# Patient Record
Sex: MALE | Race: WHITE | NOT HISPANIC OR LATINO | Employment: OTHER | URBAN - METROPOLITAN AREA
[De-identification: names, ages, dates, MRNs, and addresses within clinical notes are randomized per-mention and may not be internally consistent; named-entity substitution may affect disease eponyms.]

---

## 2017-01-02 ENCOUNTER — HOSPITAL ENCOUNTER (INPATIENT)
Facility: CLINIC | Age: 57
LOS: 1 days | Discharge: HOME | DRG: 897 | End: 2017-01-03
Attending: EMERGENCY MEDICINE | Admitting: PSYCHIATRY & NEUROLOGY
Payer: COMMERCIAL

## 2017-01-02 DIAGNOSIS — F10.930 ALCOHOL WITHDRAWAL, UNCOMPLICATED (H): ICD-10-CM

## 2017-01-02 PROBLEM — F10.20 ALCOHOL DEPENDENCE (H): Status: ACTIVE | Noted: 2017-01-02

## 2017-01-02 LAB
ALCOHOL BREATH TEST: 0 (ref 0–0.01)
ANION GAP SERPL CALCULATED.3IONS-SCNC: 8 MMOL/L (ref 3–14)
BUN SERPL-MCNC: 15 MG/DL (ref 7–30)
CALCIUM SERPL-MCNC: 8.8 MG/DL (ref 8.5–10.1)
CHLORIDE SERPL-SCNC: 100 MMOL/L (ref 94–109)
CO2 SERPL-SCNC: 29 MMOL/L (ref 20–32)
CREAT SERPL-MCNC: 0.9 MG/DL (ref 0.66–1.25)
GFR SERPL CREATININE-BSD FRML MDRD: 86 ML/MIN/1.7M2
GLUCOSE BLDC GLUCOMTR-MCNC: 99 MG/DL (ref 70–99)
GLUCOSE SERPL-MCNC: 114 MG/DL (ref 70–99)
POTASSIUM SERPL-SCNC: 3.5 MMOL/L (ref 3.4–5.3)
SODIUM SERPL-SCNC: 137 MMOL/L (ref 133–144)

## 2017-01-02 PROCEDURE — 25000132 ZZH RX MED GY IP 250 OP 250 PS 637: Performed by: EMERGENCY MEDICINE

## 2017-01-02 PROCEDURE — 96361 HYDRATE IV INFUSION ADD-ON: CPT | Performed by: EMERGENCY MEDICINE

## 2017-01-02 PROCEDURE — 25000132 ZZH RX MED GY IP 250 OP 250 PS 637: Performed by: PSYCHIATRY & NEUROLOGY

## 2017-01-02 PROCEDURE — 12800008 ZZH R&B CD ADULT

## 2017-01-02 PROCEDURE — 80048 BASIC METABOLIC PNL TOTAL CA: CPT | Performed by: EMERGENCY MEDICINE

## 2017-01-02 PROCEDURE — HZ2ZZZZ DETOXIFICATION SERVICES FOR SUBSTANCE ABUSE TREATMENT: ICD-10-PCS | Performed by: PSYCHIATRY & NEUROLOGY

## 2017-01-02 PROCEDURE — 25000125 ZZHC RX 250: Performed by: EMERGENCY MEDICINE

## 2017-01-02 PROCEDURE — 99285 EMERGENCY DEPT VISIT HI MDM: CPT | Mod: Z6 | Performed by: EMERGENCY MEDICINE

## 2017-01-02 PROCEDURE — 96374 THER/PROPH/DIAG INJ IV PUSH: CPT | Performed by: EMERGENCY MEDICINE

## 2017-01-02 PROCEDURE — 25800025 ZZH RX 258: Performed by: EMERGENCY MEDICINE

## 2017-01-02 PROCEDURE — 99285 EMERGENCY DEPT VISIT HI MDM: CPT | Performed by: EMERGENCY MEDICINE

## 2017-01-02 PROCEDURE — 00000146 ZZHCL STATISTIC GLUCOSE BY METER IP

## 2017-01-02 RX ORDER — MAGNESIUM OXIDE 400 MG/1
800 TABLET ORAL ONCE
Status: COMPLETED | OUTPATIENT
Start: 2017-01-02 | End: 2017-01-02

## 2017-01-02 RX ORDER — LANOLIN ALCOHOL/MO/W.PET/CERES
100 CREAM (GRAM) TOPICAL ONCE
Status: COMPLETED | OUTPATIENT
Start: 2017-01-02 | End: 2017-01-02

## 2017-01-02 RX ORDER — SODIUM CHLORIDE, SODIUM LACTATE, POTASSIUM CHLORIDE, CALCIUM CHLORIDE 600; 310; 30; 20 MG/100ML; MG/100ML; MG/100ML; MG/100ML
1000 INJECTION, SOLUTION INTRAVENOUS CONTINUOUS
Status: DISCONTINUED | OUTPATIENT
Start: 2017-01-02 | End: 2017-01-02

## 2017-01-02 RX ORDER — METOPROLOL TARTRATE 50 MG
50 TABLET ORAL 2 TIMES DAILY
COMMUNITY
End: 2017-12-11

## 2017-01-02 RX ORDER — MULTIVITAMIN,THERAPEUTIC
1 TABLET ORAL ONCE
Status: DISCONTINUED | OUTPATIENT
Start: 2017-01-02 | End: 2017-01-02

## 2017-01-02 RX ORDER — MAGNESIUM OXIDE 400 MG/1
800 TABLET ORAL ONCE
Status: DISCONTINUED | OUTPATIENT
Start: 2017-01-02 | End: 2017-01-02

## 2017-01-02 RX ORDER — CHLORAL HYDRATE 500 MG
1 CAPSULE ORAL DAILY
Status: DISCONTINUED | OUTPATIENT
Start: 2017-01-03 | End: 2017-01-03 | Stop reason: HOSPADM

## 2017-01-02 RX ORDER — LANOLIN ALCOHOL/MO/W.PET/CERES
100 CREAM (GRAM) TOPICAL ONCE
Status: DISCONTINUED | OUTPATIENT
Start: 2017-01-02 | End: 2017-01-02

## 2017-01-02 RX ORDER — ASPIRIN 325 MG
325 TABLET ORAL DAILY
Status: DISCONTINUED | OUTPATIENT
Start: 2017-01-03 | End: 2017-01-03 | Stop reason: HOSPADM

## 2017-01-02 RX ORDER — LANOLIN ALCOHOL/MO/W.PET/CERES
100 CREAM (GRAM) TOPICAL DAILY
Status: DISCONTINUED | OUTPATIENT
Start: 2017-01-03 | End: 2017-01-03 | Stop reason: HOSPADM

## 2017-01-02 RX ORDER — ACETAMINOPHEN 325 MG/1
650 TABLET ORAL EVERY 4 HOURS PRN
Status: DISCONTINUED | OUTPATIENT
Start: 2017-01-02 | End: 2017-01-03 | Stop reason: HOSPADM

## 2017-01-02 RX ORDER — ONDANSETRON 4 MG/1
4 TABLET, ORALLY DISINTEGRATING ORAL EVERY 6 HOURS PRN
Status: DISCONTINUED | OUTPATIENT
Start: 2017-01-02 | End: 2017-01-03 | Stop reason: HOSPADM

## 2017-01-02 RX ORDER — LISINOPRIL 20 MG/1
20 TABLET ORAL DAILY
Status: DISCONTINUED | OUTPATIENT
Start: 2017-01-03 | End: 2017-01-03 | Stop reason: HOSPADM

## 2017-01-02 RX ORDER — FOLIC ACID 1 MG/1
1 TABLET ORAL DAILY
Status: DISCONTINUED | OUTPATIENT
Start: 2017-01-03 | End: 2017-01-03 | Stop reason: HOSPADM

## 2017-01-02 RX ORDER — METOPROLOL TARTRATE 50 MG
50 TABLET ORAL 2 TIMES DAILY
Status: DISCONTINUED | OUTPATIENT
Start: 2017-01-02 | End: 2017-01-03 | Stop reason: HOSPADM

## 2017-01-02 RX ORDER — PHENOL 1.4 %
1 AEROSOL, SPRAY (ML) MUCOUS MEMBRANE
COMMUNITY
End: 2017-12-11

## 2017-01-02 RX ORDER — DIAZEPAM 5 MG
5-20 TABLET ORAL EVERY 30 MIN PRN
Status: DISCONTINUED | OUTPATIENT
Start: 2017-01-02 | End: 2017-01-03 | Stop reason: HOSPADM

## 2017-01-02 RX ORDER — LISINOPRIL 20 MG/1
20 TABLET ORAL DAILY
COMMUNITY
End: 2017-12-11

## 2017-01-02 RX ORDER — MULTIPLE VITAMINS W/ MINERALS TAB 9MG-400MCG
1 TAB ORAL DAILY
Status: DISCONTINUED | OUTPATIENT
Start: 2017-01-03 | End: 2017-01-03 | Stop reason: HOSPADM

## 2017-01-02 RX ORDER — CLONIDINE HYDROCHLORIDE 0.1 MG/1
0.1 TABLET ORAL DAILY
COMMUNITY
End: 2020-12-28

## 2017-01-02 RX ORDER — FOLIC ACID 1 MG/1
1 TABLET ORAL ONCE
Status: COMPLETED | OUTPATIENT
Start: 2017-01-02 | End: 2017-01-02

## 2017-01-02 RX ORDER — MULTIVITAMIN,THERAPEUTIC
1 TABLET ORAL ONCE
Status: COMPLETED | OUTPATIENT
Start: 2017-01-02 | End: 2017-01-02

## 2017-01-02 RX ORDER — CLONIDINE HYDROCHLORIDE 0.1 MG/1
0.1 TABLET ORAL 2 TIMES DAILY
Status: DISCONTINUED | OUTPATIENT
Start: 2017-01-02 | End: 2017-01-03 | Stop reason: HOSPADM

## 2017-01-02 RX ORDER — ALUMINA, MAGNESIA, AND SIMETHICONE 2400; 2400; 240 MG/30ML; MG/30ML; MG/30ML
30 SUSPENSION ORAL EVERY 4 HOURS PRN
Status: DISCONTINUED | OUTPATIENT
Start: 2017-01-02 | End: 2017-01-03 | Stop reason: HOSPADM

## 2017-01-02 RX ORDER — HYDROXYZINE HYDROCHLORIDE 25 MG/1
25-50 TABLET, FILM COATED ORAL EVERY 4 HOURS PRN
Status: DISCONTINUED | OUTPATIENT
Start: 2017-01-02 | End: 2017-01-03 | Stop reason: HOSPADM

## 2017-01-02 RX ORDER — FOLIC ACID 1 MG/1
1 TABLET ORAL ONCE
Status: DISCONTINUED | OUTPATIENT
Start: 2017-01-02 | End: 2017-01-02

## 2017-01-02 RX ORDER — LORAZEPAM 2 MG/ML
1 INJECTION INTRAMUSCULAR ONCE
Status: COMPLETED | OUTPATIENT
Start: 2017-01-02 | End: 2017-01-02

## 2017-01-02 RX ORDER — HYDROCHLOROTHIAZIDE 25 MG/1
25 TABLET ORAL DAILY
COMMUNITY
End: 2017-12-11

## 2017-01-02 RX ORDER — TRAZODONE HYDROCHLORIDE 50 MG/1
50 TABLET, FILM COATED ORAL
Status: DISCONTINUED | OUTPATIENT
Start: 2017-01-02 | End: 2017-01-03 | Stop reason: HOSPADM

## 2017-01-02 RX ORDER — HYDROCHLOROTHIAZIDE 25 MG/1
25 TABLET ORAL DAILY
Status: DISCONTINUED | OUTPATIENT
Start: 2017-01-03 | End: 2017-01-03 | Stop reason: HOSPADM

## 2017-01-02 RX ADMIN — METOPROLOL TARTRATE 50 MG: 50 TABLET, FILM COATED ORAL at 21:57

## 2017-01-02 RX ADMIN — SODIUM CHLORIDE, POTASSIUM CHLORIDE, SODIUM LACTATE AND CALCIUM CHLORIDE 1000 ML: 600; 310; 30; 20 INJECTION, SOLUTION INTRAVENOUS at 18:08

## 2017-01-02 RX ADMIN — Medication 100 MG: at 16:52

## 2017-01-02 RX ADMIN — FOLIC ACID 1 MG: 1 TABLET ORAL at 16:50

## 2017-01-02 RX ADMIN — MAGNESIUM OXIDE TAB 400 MG (241.3 MG ELEMENTAL MG) 800 MG: 400 (241.3 MG) TAB at 16:50

## 2017-01-02 RX ADMIN — LORAZEPAM 1 MG: 2 INJECTION INTRAMUSCULAR; INTRAVENOUS at 16:48

## 2017-01-02 RX ADMIN — THERA TABS 1 TABLET: TAB at 16:51

## 2017-01-02 RX ADMIN — SODIUM CHLORIDE, POTASSIUM CHLORIDE, SODIUM LACTATE AND CALCIUM CHLORIDE 1000 ML: 600; 310; 30; 20 INJECTION, SOLUTION INTRAVENOUS at 17:20

## 2017-01-02 RX ADMIN — CLONIDINE HYDROCHLORIDE 0.1 MG: 0.1 TABLET ORAL at 21:57

## 2017-01-02 RX ADMIN — SODIUM CHLORIDE, POTASSIUM CHLORIDE, SODIUM LACTATE AND CALCIUM CHLORIDE 1000 ML: 600; 310; 30; 20 INJECTION, SOLUTION INTRAVENOUS at 16:48

## 2017-01-02 ASSESSMENT — ENCOUNTER SYMPTOMS
TREMORS: 1
SEIZURES: 0
RESPIRATORY NEGATIVE: 1
FEVER: 0
NERVOUS/ANXIOUS: 1
CARDIOVASCULAR NEGATIVE: 1
SLEEP DISTURBANCE: 1

## 2017-01-02 ASSESSMENT — ACTIVITIES OF DAILY LIVING (ADL)
ORAL_HYGIENE: INDEPENDENT
GROOMING: INDEPENDENT
DRESS: INDEPENDENT

## 2017-01-02 NOTE — IP AVS SNAPSHOT
MRN:[id number]                      After Visit Summary   1/2/2017    Rashawn Sohrt    MRN: [id number]           Thank you!     Thank you for choosing [facility] for your care. Our goal is always to provide you with excellent care. Hearing back from our patients is one way we can continue to improve our services. Please take a few minutes to complete the written survey that you may receive in the mail after you visit with us. Thank you!        Patient Information     Date Of Birth          1960        About your hospital stay     You were admitted on:  January 2, 2017 You last received care in the:  [facility]    You were discharged on:  January 3, 2017       Who to Call     For medical emergencies, please call 911.  For non-urgent questions about your medical care, please call your primary care provider or clinic, None          Attending Provider     Provider    Robert Jones MD Veluvali, Sreejaya, MD       Primary Care Provider Fax #    [facility] [phone] [address] [facility] [address]        Further instructions from your care team       Behavioral [place] Discharge Planning and Instructions  THANK YOU FOR CHOOSING THE [facility]  [facility]: [phone]    Summary: You were admitted to and processed through [facility] on January 2, 2017 for detoxification from alcohol.  You have met with a  and opted to discharge back to your parents house to pursue outpatient treatment.  Please make your recovery a priority, Mr. Montgomery.     Main Diagnosis:  Alcohol Dependency    Major Treatments, Procedures and Findings:  You were detoxified from alcohol using the appropriate protocol(s).  You have had blood drawn, and the results have been reviewed with you.  Please take a copy of your laboratory work with you to your next provider appointment.    Symptoms to Report:  If you experience more anxiety,  confusion, sleeplessness, deep sadness or thoughts of suicide, notify your treatment team or notify your primary care physician. IF THE SYMPTOMS YOU ARE EXPERIENCING ARE A MEDICAL EMERGENCY, CALL 911 IMMEDIATELY.     Lifestyle Adjustment: Adjust your lifestyle to get enough sleep, relaxation, exercise and excellent nutrition. Continue to develop healthy coping skills to decrease stress and promote a sober living environment. Do not use alcohol, illegal drugs or addictive medications other than what is currently prescribed. AA, NA, and a sponsor are excellent resources for support.     Primary Provider:  You have stated that you will follow up as able with [facility], once secured in treatment.   Care Giver: Dr. Ludwig HARPER Appointment Date/Time: 1/17/17; Office Number: [phone]    Outpatient CD Treatment: You stated that you would like to pursue outpatient treatment at [facility].  Please schedule an assessment by calling them at [phone].  [facility], [address] [place] [address].    Resources:  [facility] [phone] Support Group:  AA/NA and S  Crisis Intervention: 193.980.2956 or 751-865-4983 (TTY: 687.877.6163). Call anytime for help.  National [place] on Mental Illness (www.mn.xochilt.org): 312.622.8048 or 124-421-3231.  Alcoholics Anonymous (www.alcoholics-anonymous.org): Check your phone book for your local chapter.  Suicide Awareness Voices of Education (www.save.org): 598-861-TELD (2899)  National Suicide Prevention Line (www.mentalhealthmn.org): 675-592-VMBU (7863)  Mental Health Consumer/Survivor Network of [place] (www.mhcsn.net): 703.855.7942 or 606-179-7875.  M[place] Health Association [place] (www.mentalhealth.org): 764.764.4682 or 907-976-2428  Substance Abuse and Mental Health Services. (www.samhsa.gov)    [place] Recovery Connection (MRC)  [facility] connects people seeking recovery to resources that help foster  "and sustain long-term recovery.  Whether you are seeking resources for treatment, transportation, housing, job training, education, health care or other pathways to recovery, [facility] is a great place to start. 177.822.7955 www.[facility].org    General Medication Instruction: See your medication papers for instructions. Take all medicines as directed.  Make no changes unless your doctor suggests them. Go to all your doctor visits.  Be sure to have all your required lab tests. This way, your medicines may be refilled on time. Do not use any drugs not prescribed by your provider. AA/NA and sponsors are excellent resources for support. Avoid alcohol at all costs!    Please Note:  If you have any questions at anytime after you are discharged please call the [facility] [place] detoxification [facility] at [phone].  [facility], Behavioral Intake [phone]. Please take this discharge folder with you to all your follow up appointments, it contains your lab results, diagnosis, medication list and discharge recommendations. THANK YOU FOR CHOOSING THE [facility]                Pending Results     No orders found for last 2 day(s).            Statement of Approval     Ordered          01/03/17 1222  I have reviewed and agree with all the recommendations and orders detailed in this document.   EFFECTIVE NOW     Comments:  After detox is complete   Approved and electronically signed by:  Kelin Hamlin MD             Admission Information        Provider Department Dept Phone    1/2/2017 Kelin Hamlin MD  [facility] [phone]      Your Vitals Were     Blood Pressure Pulse Temperature    128/84 mmHg 58 98.1  F (36.7  C) (Oral)    Respirations Height Weight    16 1.803 m (5' 11\") 93.441 kg (206 lb)    BMI (Body Mass Index) Pulse Oximetry       28.74 kg/m2 96%       MyChart Information     [facility] lets you send messages to your doctor, view " "your test results, renew your prescriptions, schedule appointments and more. To sign up, go to [email or web address] . Click on \"Log in\" on the left side of the screen, which will take you to the Welcome page. Then click on \"Sign up Now\" on the right side of the page.     You will be asked to enter the access code listed below, as well as some personal information. Please follow the directions to create your username and password.     Your access code is: [email or web address]  Expires: 4/3/2017  1:57 PM     Your access code will  in 90 days. If you need help or a new code, please call your [place] clinic or [phone].        Care EveryWhere ID     This is your Care EveryWhere ID. This could be used by other organizations to access your [place] medical records  [id number]           Review of your medicines      START taking        Dose / Directions    folic acid 1 MG tablet   Commonly known as:  FOLVITE   Used for:  Alcohol withdrawal, uncomplicated (H)        Dose:  1 mg   Take 1 tablet (1 mg) by mouth daily   Quantity:  30 tablet   Refills:  0         CONTINUE these medicines which may have CHANGED, or have new prescriptions. If we are uncertain of the size of tablets/capsules you have at home, strength may be listed as something that might have changed.        Dose / Directions    fish oil-omega-3 fatty acids 1000 MG capsule   This may have changed:  how much to take   Used for:  HTN (hypertension)        Dose:  2 g   Take 2 capsules by mouth daily.   Quantity:  30 capsule   Refills:  0         CONTINUE these medicines which have NOT CHANGED        Dose / Directions    aspirin 325 MG tablet   Used for:  HTN (hypertension)        Dose:  325 mg   Take 1 tablet by mouth daily.   Quantity:  30 tablet   Refills:  0       cloNIDine 0.1 MG tablet   Commonly known as:  CATAPRES        Dose:  0.1 mg   Take 0.1 mg by mouth 2 times daily   Refills:  0       hydrochlorothiazide 25 MG tablet   Commonly known as:  " HYDRODIURIL        Dose:  25 mg   Take 25 mg by mouth daily   Refills:  0       lisinopril 20 MG tablet   Commonly known as:  PRINIVIL/ZESTRIL        Dose:  20 mg   Take 20 mg by mouth daily   Refills:  0       metoprolol 50 MG tablet   Commonly known as:  LOPRESSOR        Dose:  50 mg   Take 50 mg by mouth 2 times daily   Refills:  0       MULTIVITAMIN ADULTS 50+ Tabs        Dose:  1 tablet   Take 1 tablet by mouth   Refills:  0       thiamine 100 MG tablet   Used for:  EtOH dependence (H)        Dose:  100 mg   Take 1 tablet by mouth daily.   Quantity:  30 tablet   Refills:  0            Where to get your medicines      These medications were sent to [facility] [place], [place] - [address]  [address] [address]     Phone:  [phone]    - folic acid 1 MG tablet             Protect others around you: Learn how to safely use, store and throw away your medicines at www.disposemymeds.org.             Medication List: This is a list of all your medications and when to take them. Check marks below indicate your daily home schedule. Keep this list as a reference.      Medications           Morning Afternoon Evening Bedtime As Needed    aspirin 325 MG tablet   Take 1 tablet by mouth daily.   Last time this was given:  325 mg on 1/3/2017  9:52 AM                                cloNIDine 0.1 MG tablet   Commonly known as:  CATAPRES   Take 0.1 mg by mouth 2 times daily   Last time this was given:  0.1 mg on 1/3/2017  9:52 AM                                fish oil-omega-3 fatty acids 1000 MG capsule   Take 2 capsules by mouth daily.   Last time this was given:  1 g on 1/3/2017  9:53 AM                                folic acid 1 MG tablet   Commonly known as:  FOLVITE   Take 1 tablet (1 mg) by mouth daily   Last time this was given:  1 mg on 1/3/2017  9:53 AM                                hydrochlorothiazide 25 MG tablet   Commonly known as:  HYDRODIURIL   Take 25 mg by mouth  daily   Last time this was given:  25 mg on 1/3/2017  9:53 AM                                lisinopril 20 MG tablet   Commonly known as:  PRINIVIL/ZESTRIL   Take 20 mg by mouth daily   Last time this was given:  20 mg on 1/3/2017  9:53 AM                                metoprolol 50 MG tablet   Commonly known as:  LOPRESSOR   Take 50 mg by mouth 2 times daily   Last time this was given:  50 mg on 1/3/2017  9:54 AM                                MULTIVITAMIN ADULTS 50+ Tabs   Take 1 tablet by mouth   Last time this was given:  1 tablet on 1/3/2017  9:54 AM                                thiamine 100 MG tablet   Take 1 tablet by mouth daily.   Last time this was given:  100 mg on 1/3/2017  9:54 AM

## 2017-01-02 NOTE — IP AVS SNAPSHOT
[facility]    [address] [place] [id number]    Phone:  [phone]                                       After Visit Summary   1/2/2017    Rashawn Short    MRN: [id number]           After Visit Summary Signature Page     I have received my discharge instructions, and my questions have been answered. I have discussed any challenges I see with this plan with the nurse or doctor.    ..........................................................................................................................................  Patient/Patient Representative Signature      ..........................................................................................................................................  Patient Representative Print Name and Relationship to Patient    ..................................................               ................................................  Date                                            Time    ..........................................................................................................................................  Reviewed by Signature/Title    ...................................................              ..............................................  Date                                                            Time

## 2017-01-02 NOTE — ED NOTES
States he is here due to withdrawal symptoms.  Drinks daily for the past several months.  Drinks 1 pint vodka per day.  Last drink 1 1/2 days ago.  Has not slept for 2 days and feels he may be dehydrated.  Does not wish to be admitted.  Feels shakey.

## 2017-01-02 NOTE — ED PROVIDER NOTES
History     Chief Complaint   Patient presents with     Alcohol Problem     Last drink was in middle of NOC Sunday. Pt has been drinking approx 2 qts of vodka per day.     HPI  Rashawn Sohrt is a 56 year old male living in sober house with history of alcohol dependence and depression who presents to the ED today for evaluation of alcohol addiction. Patient states he has been sober since April 13th and relapsed about 6 weeks ago. Since relapsing patient reports drinking more than a quart of vodka a day. He states his last drink was at 2 AM on Sunday morning. He states he hasn't been able to eat, drink or sleep since. Patient feels he is in withdrawal. He reports shakes. He denies a history withdrawal seizures. He is not interested in detox admission.        I have reviewed the Medications, Allergies, Past Medical and Surgical History, and Social History in the [employer] system.  PAST MEDICAL HISTORY:   Past Medical History   Diagnosis Date     Hypertension      Depressive disorder      EtOH dependence (H)        PAST SURGICAL HISTORY:   Past Surgical History   Procedure Laterality Date     Orthopedic surgery  1998     ACL Right knee reconstruction-        FAMILY HISTORY:   Family History   Problem Relation Age of Onset     C.A.D. No family hx of      DIABETES No family hx of      Hypertension No family hx of      CEREBROVASCULAR DISEASE No family hx of      Breast Cancer No family hx of      Cancer - colorectal No family hx of      Prostate Cancer No family hx of        SOCIAL HISTORY:   Social History   Substance Use Topics     Smoking status: Never Smoker      Smokeless tobacco: Never Used     Alcohol Use: Yes      Comment: etoh abuse       Current Facility-Administered Medications   Medication     lactated ringers BOLUS 1,000 mL    Followed by     lactated ringers infusion     Current Outpatient Prescriptions   Medication     CLONIDINE HCL PO     aspirin 325 MG tablet     lisinopril-hydrochlorothiazide  "(PRINZIDE,ZESTORETIC) 20-25 MG per tablet     multivitamin, therapeutic (THERA-VIT) TABS     thiamine 100 MG tablet     fish oil-omega-3 fatty acids 1000 MG capsule     metoprolol (TOPROL-XL) 100 MG 24 hr tablet     naltrexone (DEPADE;REVIA) 50 MG tablet     citalopram (CELEXA) 10 MG tablet     acetaminophen (TYLENOL) 325 MG tablet     loperamide (LOPERAMIDE A-D) 2 MG tablet     [DISCONTINUED] aspirin 325 MG tablet      No Known Allergies      Review of Systems   Constitutional: Negative for fever.   Respiratory: Negative.    Cardiovascular: Negative.    Neurological: Positive for tremors. Negative for seizures.   Psychiatric/Behavioral: Positive for sleep disturbance. Negative for suicidal ideas. The patient is nervous/anxious.    All other systems reviewed and are negative.      Physical Exam   BP: (!) 176/97 mmHg  Heart Rate: 88  Temp: 98.2  F (36.8  C)  Resp: 18  Height: 180.3 cm (5' 11\")  Weight: 88.451 kg (195 lb)  SpO2: 97 %  Physical Exam   Constitutional: He is oriented to person, place, and time. He appears well-developed and well-nourished. He appears distressed.   HENT:   Head: Atraumatic.   Eyes: Pupils are equal, round, and reactive to light.   Neck: Neck supple.   Cardiovascular: Normal rate, regular rhythm and normal heart sounds.    No murmur heard.  Pulmonary/Chest: Effort normal and breath sounds normal.   Abdominal: Soft. There is no tenderness.   Neurological: He is alert and oriented to person, place, and time.   Skin: He is not diaphoretic.   Psychiatric: He has a normal mood and affect. His behavior is normal.   Nursing note and vitals reviewed.      ED Course   Procedures       4:23 PM  The patient was seen and examined by Dr. Jones in Room 12.     Medications   lactated ringers BOLUS 1,000 mL (0 mLs Intravenous Stopped 1/2/17 1718)     Followed by   lactated ringers BOLUS 1,000 mL (1,000 mLs Intravenous New Bag 1/2/17 1720)     Followed by   lactated ringers infusion (not administered) "   LORazepam (ATIVAN) injection 1 mg (1 mg Intravenous Given 1/2/17 1648)   multivitamin, therapeutic (THERA-VIT) tablet 1 tablet (1 tablet Oral Given 1/2/17 1651)   folic acid (FOLVITE) tablet 1 mg (1 mg Oral Given 1/2/17 1650)   thiamine tablet 100 mg (100 mg Oral Given 1/2/17 1652)   magnesium oxide (MAG-OX) tablet 800 mg (800 mg Oral Given 1/2/17 1650)            Labs Ordered and Resulted from Time of ED Arrival Up to the Time of Departure from the ED   BASIC METABOLIC PANEL - Abnormal; Notable for the following:     Glucose 114 (*)     All other components within normal limits   ALCOHOL BREATH TEST POCT - Normal   GLUCOSE MONITOR NURSING POCT   GLUCOSE BY METER   DRUG ABUSE SCREEN 6 CHEM DEP URINE ([facility])       Assessments & Plan (with Medical Decision Making)   This is a 56 year old male, chronic alcoholic, currently living in a sober house and yet drinking at least a quart a day. His last drink was Sunday, he is here tremulous, nauseated, not eating, not drinking and feeling ill. He is open to pursuing inpatient detox. Here he was given fluids and vitamins and his labs were checked. He was given Ativan with improvement. He is medically stable for inpatient detox when a bed in available.     I have reviewed the nursing notes.    I have reviewed the findings, diagnosis, plan and need for follow up with the patient.  This part of the document was transcribed by Haylee Mendez for Robert Jones MD.  New Prescriptions    No medications on file       Final diagnoses:   Alcohol withdrawal, uncomplicated (H)     Manish MONTALVO , am serving as a trained medical scribe to document services personally performed by Robert Jones MD, based on the provider's statements to me.   Robert MONTALVO MD, was physically present and have reviewed and verified the accuracy of this note documented by Manish Mansfield.    1/2/2017   [facility], EMERGENCY DEPARTMENT      Robert Jones MD  01/02/17  1932

## 2017-01-03 VITALS
HEIGHT: 71 IN | WEIGHT: 206 LBS | TEMPERATURE: 98.1 F | DIASTOLIC BLOOD PRESSURE: 84 MMHG | SYSTOLIC BLOOD PRESSURE: 128 MMHG | RESPIRATION RATE: 16 BRPM | HEART RATE: 58 BPM | BODY MASS INDEX: 28.84 KG/M2 | OXYGEN SATURATION: 96 %

## 2017-01-03 LAB
ALBUMIN SERPL-MCNC: 4 G/DL (ref 3.4–5)
ALP SERPL-CCNC: 73 U/L (ref 40–150)
ALT SERPL W P-5'-P-CCNC: 43 U/L (ref 0–70)
ANION GAP SERPL CALCULATED.3IONS-SCNC: 9 MMOL/L (ref 3–14)
AST SERPL W P-5'-P-CCNC: 48 U/L (ref 0–45)
BILIRUB SERPL-MCNC: 0.9 MG/DL (ref 0.2–1.3)
BUN SERPL-MCNC: 8 MG/DL (ref 7–30)
CALCIUM SERPL-MCNC: 8.8 MG/DL (ref 8.5–10.1)
CHLORIDE SERPL-SCNC: 102 MMOL/L (ref 94–109)
CO2 SERPL-SCNC: 26 MMOL/L (ref 20–32)
CREAT SERPL-MCNC: 0.88 MG/DL (ref 0.66–1.25)
ERYTHROCYTE [DISTWIDTH] IN BLOOD BY AUTOMATED COUNT: 14.4 % (ref 10–15)
GFR SERPL CREATININE-BSD FRML MDRD: 89 ML/MIN/1.7M2
GGT SERPL-CCNC: 54 U/L (ref 0–75)
GLUCOSE SERPL-MCNC: 105 MG/DL (ref 70–99)
HCT VFR BLD AUTO: 45.6 % (ref 40–53)
HGB BLD-MCNC: 15.5 G/DL (ref 13.3–17.7)
MCH RBC QN AUTO: 32.8 PG (ref 26.5–33)
MCHC RBC AUTO-ENTMCNC: 34 G/DL (ref 31.5–36.5)
MCV RBC AUTO: 96 FL (ref 78–100)
PLATELET # BLD AUTO: 197 10E9/L (ref 150–450)
POTASSIUM SERPL-SCNC: 3.8 MMOL/L (ref 3.4–5.3)
PROT SERPL-MCNC: 7.5 G/DL (ref 6.8–8.8)
RBC # BLD AUTO: 4.73 10E12/L (ref 4.4–5.9)
SODIUM SERPL-SCNC: 137 MMOL/L (ref 133–144)
WBC # BLD AUTO: 7.5 10E9/L (ref 4–11)

## 2017-01-03 PROCEDURE — 99234 HOSP IP/OBS SM DT SF/LOW 45: CPT | Performed by: PSYCHIATRY & NEUROLOGY

## 2017-01-03 PROCEDURE — 99232 SBSQ HOSP IP/OBS MODERATE 35: CPT | Performed by: PHYSICIAN ASSISTANT

## 2017-01-03 PROCEDURE — 82977 ASSAY OF GGT: CPT | Performed by: PSYCHIATRY & NEUROLOGY

## 2017-01-03 PROCEDURE — 80053 COMPREHEN METABOLIC PANEL: CPT | Performed by: PSYCHIATRY & NEUROLOGY

## 2017-01-03 PROCEDURE — 85027 COMPLETE CBC AUTOMATED: CPT | Performed by: PSYCHIATRY & NEUROLOGY

## 2017-01-03 PROCEDURE — 25000132 ZZH RX MED GY IP 250 OP 250 PS 637: Performed by: PSYCHIATRY & NEUROLOGY

## 2017-01-03 PROCEDURE — 36415 COLL VENOUS BLD VENIPUNCTURE: CPT | Performed by: PSYCHIATRY & NEUROLOGY

## 2017-01-03 RX ORDER — FOLIC ACID 1 MG/1
1 TABLET ORAL DAILY
Qty: 30 TABLET | Refills: 0 | Status: SHIPPED | OUTPATIENT
Start: 2017-01-03 | End: 2017-02-15

## 2017-01-03 RX ADMIN — HYDROCHLOROTHIAZIDE 25 MG: 25 TABLET ORAL at 09:53

## 2017-01-03 RX ADMIN — FOLIC ACID 1 MG: 1 TABLET ORAL at 09:53

## 2017-01-03 RX ADMIN — CLONIDINE HYDROCHLORIDE 0.1 MG: 0.1 TABLET ORAL at 09:52

## 2017-01-03 RX ADMIN — MULTIPLE VITAMINS W/ MINERALS TAB 1 TABLET: TAB at 09:54

## 2017-01-03 RX ADMIN — ASPIRIN 325 MG ORAL TABLET 325 MG: 325 PILL ORAL at 09:52

## 2017-01-03 RX ADMIN — Medication 1 G: at 09:53

## 2017-01-03 RX ADMIN — LISINOPRIL 20 MG: 20 TABLET ORAL at 09:53

## 2017-01-03 RX ADMIN — METOPROLOL TARTRATE 50 MG: 50 TABLET, FILM COATED ORAL at 09:54

## 2017-01-03 RX ADMIN — Medication 100 MG: at 09:54

## 2017-01-03 ASSESSMENT — ACTIVITIES OF DAILY LIVING (ADL)
ORAL_HYGIENE: INDEPENDENT
DRESS: STREET CLOTHES
GROOMING: INDEPENDENT
LAUNDRY: WITH SUPERVISION

## 2017-01-03 NOTE — DISCHARGE INSTRUCTIONS
Behavioral [place] Discharge Planning and Instructions  THANK YOU FOR CHOOSING THE [facility]  [facility]: [phone]    Summary: You were admitted to and processed through [facility] on January 2, 2017 for detoxification from alcohol.  You have met with a  and opted to discharge back to your parents house to pursue outpatient treatment.  Please make your recovery a priority, Mr. Montgomery.     Main Diagnosis:  Alcohol Dependency    Major Treatments, Procedures and Findings:  You were detoxified from alcohol using the appropriate protocol(s).  You have had blood drawn, and the results have been reviewed with you.  Please take a copy of your laboratory work with you to your next provider appointment.    Symptoms to Report:  If you experience more anxiety, confusion, sleeplessness, deep sadness or thoughts of suicide, notify your treatment team or notify your primary care physician. IF THE SYMPTOMS YOU ARE EXPERIENCING ARE A MEDICAL EMERGENCY, CALL 911 IMMEDIATELY.     Lifestyle Adjustment: Adjust your lifestyle to get enough sleep, relaxation, exercise and excellent nutrition. Continue to develop healthy coping skills to decrease stress and promote a sober living environment. Do not use alcohol, illegal drugs or addictive medications other than what is currently prescribed. AA, NA, and a sponsor are excellent resources for support.     Primary Provider:  You have stated that you will follow up as able with [facility], once secured in treatment.   Care Giver: Dr. Ludwig HARPER Appointment Date/Time: 1/17/17; Office Number: [phone]    Outpatient CD Treatment: You stated that [facility].  Please schedule an assessment by calling them at [phone].  [facility], [address] [place] [address].    Resources:  [facility] 323-424-1464 Support Group:  AA/NA  and S  Crisis Intervention: 619.175.1653 or 565-515-0768 (TTY: 281.248.9415). Call anytime for help.  National [place] on Mental Illness (www.mn.xochilt.org): 572.877.7519 or 398-091-9878.  Alcoholics Anonymous (www.alcoholics-anonymous.org): Check your phone book for your local chapter.  Suicide Awareness Voices of Education (www.save.org): 334-477-AQGV (5224)  National Suicide Prevention Line (www.mentalhealthmn.org): 369-965-OOMX (3967)  Mental Health Consumer/Survivor Network of [place] (www.mhcsn.net): 732.606.4100 or 703-177-0279.  Mental Health Association of [place] (www.mentalhealth.org): 172.393.6495 or 115-188-8465  Substance Abuse and Mental Health Services. (www.samhsa.gov)    [place] Recovery Connection ([facility])  [facility] connects people seeking recovery to resources that help foster and sustain long-term recovery.  Whether you are seeking resources for treatment, transportation, housing, job training, education, health care or other pathways to recovery, [facility] is a great place to start. 848.191.6695 www.[facility].org    General Medication Instruction: See your medication papers for instructions. Take all medicines as directed.  Make no changes unless your doctor suggests them. Go to all your doctor visits.  Be sure to have all your required lab tests. This way, your medicines may be refilled on time. Do not use any drugs not prescribed by your provider. AA/NA and sponsors are excellent resources for support. Avoid alcohol at all costs!    Please Note:  If you have any questions at anytime after you are discharged please call the [facility], [place] detoxification [facility] [address] at [phone].  [facility] , Health, Behavioral Intake [phone]. Please take this discharge folder with you to all your follow up appointments, it contains your lab results, diagnosis, medication list and discharge recommendations. THANK YOU FOR CHOOSING THE [facility]  HEALTH

## 2017-01-03 NOTE — PLAN OF CARE
Problem: Substance Withdrawal  Goal: Substance Withdrawal  Signs and symptoms of listed problems will be absent or manageable.   Outcome: No Change  Pt presents to [facility] at the request of his family for detox from alcohol. Pt reports relapsing approximately 6 weeks ago. Pt states he drinks a liter of vodka most days of the week with last use being Sunday early AM. Pt currently living in a sober house. He states he has lots of support in recovery through AA.. He wishes to detox and possibly attend an outpatient treatment program. Pt is quite anxious about being discharged on Wednesday of this week. He states he has things he needs to do that can't be delayed past that day. Pt advised of the criteria that must be met  for discharge. Pt's MSSA upon arrival = 4. Denies SI/SIB. Pt pleasant and cooperative.

## 2017-01-03 NOTE — PHARMACY-ADMISSION MEDICATION HISTORY
"Admission Medication History status for the 1/2/2017 admission is complete.  See EPIC admission navigator for Prior to Admission medications.    Medication history sources:  Patient, Mendy-Bella Macdonald ([phone])    Medication history source reliability: Good. Patient was unsure of some strengths but pharmacy had records of the 4 prescription medications (lisinopril, hydrochlorothiazide, metoprolol, clonidine) last picked up 11/10/16.    Medication adherence:  Good. Patient reported taking his medications daily in the last week.     Changes made to PTA medication list (reason)  Added:   -Clonidine 0.1 mg Tabs- Per [facility] pharmacy, last picked up 11/10/16.   -Metoprolol Tartrate 25mg Tabs- Per [facility] pharmacy, last picked up 11/10/16.  -Lisinopril 20mg Tabs- Per [facility] pharmacy, last picked up 11/10/16.  -Hydrochlorothiazide 25mg-Per [facility] pharmacy, last picked up 11/10/16.  -Multivitamin 55+- Per patient, purchases OTC.  Deleted:   -Acetaminophen- Per patient, no longer taking.  -Citalopram- Per patient, no longer taking.  -Lisinopril-hydrochlorothiazide- Per patient, now taking two separate pills.  -Loperamide- Per patient, no longer taking.  -Naltrexone- Per patient, no longer taking.  -Metoprolol 100mg- Per [facility], new strength is 50mg.  -Clonidine PO- Per [facility], strength clarified to 0.1mg tabs.  Changed:   -Fish oil- omega-3 fatty acids- Per patient, taking only one capsule of OTC formulation (1000mg \"sounds right\" but strength not known)    Additional medication history information (including reliability of information, actions taken by pharmacist):   -Per [facility] pharmacy ([phone]), someone picked up Ondansetron 4mg ODT tablet (directions of place one tablet in check every four hours as needed for nausea) prescribed by  on 1/2/17 at 7:37am. Patient did not report this medication.     Time spent in this activity: 20 minutes    Medication history completed by: " Catarina Sahu, PD2 Pharmacy Intern    Prior to Admission medications    Medication Sig Last Dose Taking? Auth Provider   cloNIDine (CATAPRES) 0.1 MG tablet Take 0.1 mg by mouth 2 times daily 1/1/2017 at Unknown time Yes Unknown, Entered By History   lisinopril (PRINIVIL/ZESTRIL) 20 MG tablet Take 20 mg by mouth daily 1/1/2017 at Unknown time Yes Unknown, Entered By History   hydrochlorothiazide (HYDRODIURIL) 25 MG tablet Take 25 mg by mouth daily 1/1/2017 at Unknown time Yes Unknown, Entered By History   metoprolol (LOPRESSOR) 50 MG tablet Take 50 mg by mouth 2 times daily 1/1/2017 at Unknown time Yes Unknown, Entered By History   Multiple Vitamins-Minerals (MULTIVITAMIN ADULTS 50+) TABS Take 1 tablet by mouth 1/1/2017 at Unknown time Yes Unknown, Entered By History   aspirin 325 MG tablet Take 1 tablet by mouth daily. 1/1/2017 at Unknown time Yes Shawanda Gibbons MD   thiamine 100 MG tablet Take 1 tablet by mouth daily. 1/1/2017 at Unknown time Yes Shawanda Gibbons MD   fish oil-omega-3 fatty acids 1000 MG capsule Take 2 capsules by mouth daily.  Patient taking differently: Take 1 g by mouth daily  1/1/2017 at Unknown time Yes Shawanda Gibbons MD

## 2017-01-03 NOTE — PLAN OF CARE
Problem: General Plan of Care (Inpatient Behavioral)  Goal: Individualization/Patient Specific Goal (IP Behavioral)  The patient and/or their representative will achieve their patient-specific goals related to the plan of care.    The patient-specific goals include: Patient will identify reason(s) for hospitalization from their perspective.  Patient will identify a goal for discharge.  Patient will identify triggers that may increase their risk for relapse.  Patient will identify coping skills they can do to stay well.   Patient will identify their support system to demonstrate readiness for discharge.  Illnesses Management & Recovery  Patient identified  Bars - as trigger for relapse.  Patient  identified Eating healthy and regular meals- as a general wellness strategy.  Patient identified feeling tense or nervous - as a warning sign that they are in danger of relapse.  Patient identified wife (Ora)- as someone they cont on to get feedback from.  Patient identified using support system - as a way to take action when in danger of relapse.  Patient identified -talking with someone trusted- as a way to cope with stress or other symptoms.

## 2017-01-03 NOTE — PROGRESS NOTES
Writer met with pt to discuss discharge plans.  Pt reported that he was discharging today at 4pm.  He is going to go back to his parents house and would like to pursue outpatient treatment at [facility].  Writer provided him with contact information to make an intake appointment there.  He reported that he will also continue his involvement with AA.

## 2017-01-03 NOTE — CONSULTS
"  [facility]  Internal Medicine Consult     Rashawn Short MRN# [id number]   Age: 56 year old YOB: 1960     Date of Admission: 1/2/2017  Date of Consult:  1/3/2017    Primary Care Provider: [facility]    Requesting Service: Psychiatry  Reason for Consult: General Medical Evaluation      SUBJECTIVE   CC:   Alcohol abuse    HPI:   Rashawn Gr is a 56 year old male with a pmh of alcohol abuse and depression who was admitted to [facility] station 3[facility] for alcohol withdrawal.     Patient states that he has a history of alcohol abuse. He used to drink large quantities of beer. In 2012 he started drinking vodka, and this became a daily habit. He was seen in detox here in 2011, and was in treatment for alcohol abuse following. He had been sober since April 2013 until 6 weeks ago. At this time he was offered a drink, and felt that he could handle just one. This turned into daily drinking, particularly \"screwdrivers\". His parents found out that he was drinking again and brought him to this ED.     He currently states that he does not have any desire to drink again. He denies cravings.     Rashawn endorses a long history of hypertension, for which he follows closely with his primary care physician at [facility]. On admission, his blood pressure was elevated, but has since come down as he has detoxed and stress has decreased.     He denies a history of MI, CHF, COPD or high cholesterol. He denies chest pain, shortness of breath or difficulty breathing. He is currently very comfortable.      Past Medical History:     Past Medical History   Diagnosis Date     Hypertension      Depressive disorder      EtOH dependence (H)          Past Surgical History:      Past Surgical History   Procedure Laterality Date     Orthopedic surgery  1998     ACL Right knee reconstruction-          Social History:     Social History   Substance Use Topics     Smoking status: Never Smoker      " "Smokeless tobacco: Never Used     Alcohol Use: Yes      Comment: etoh abuse         Family History:     Family History   Problem Relation Age of Onset     C.A.D. No family hx of      DIABETES No family hx of      Hypertension No family hx of      CEREBROVASCULAR DISEASE No family hx of      Breast Cancer No family hx of      Cancer - colorectal No family hx of      Prostate Cancer No family hx of          Allergies:   No Known Allergies      Medications:   Reviewed. Please see MAR     Review of Systems:   10 point ROS of systems including Constitutional, Eyes, Respiratory, Cardiovascular, Gastroenterology, Genitourinary, Integumentary, Muscularskeletal, Psychiatric were all negative except for pertinent positives noted in my HPI.      OBJECTIVE   Physical Exam:   Vitals were reviewed  Blood pressure 128/84, pulse 58, temperature 98.1  F (36.7  C), temperature source Oral, resp. rate 16, height 1.803 m (5' 11\"), weight 93.441 kg (206 lb), SpO2 96 %.  General:alert, NAD  HEENT: NCAT, anicteric sclera, EOMI, mucous membranes pink and moist  Neck: supple,  no lymphadenopathy or thyromegaly  Cardiovascular: RRR, S1S2  Lungs:CTAB  Abdomen: + BS, soft with no distention and no tenderness   Vascular: no edema, distal pulses palpable  Neurologic: AAO X 3, no focal deficits  Neuropsychiatric:  Skin: no jaundice, rashes, or lesions        Data:        NA      137   1/3/2017 CHLORIDE      102   1/3/2017 BUN        8   1/3/2017   POTASSIUM      3.8   1/3/2017 CO2       26   1/3/2017 CR     0.88   1/3/2017     Lab Results   Component Value Date    WBC 7.5 01/03/2017    HGB 15.5 01/03/2017    HCT 45.6 01/03/2017    MCV 96 01/03/2017     01/03/2017     Lab Results   Component Value Date    WBC 7.5 01/03/2017         Assessment and Plan/Recommendations:     Rashawn Gr is a 56 year old male with a pmh of alcohol abuse and depression who was admitted to [facility] station 3A for alcohol withdrawal.     #Alcohol Dependence. Admits " to drinking 2 quarts of vodka per day for the past 6 weeks. Prior to this was sober since April 2013. Denies history of withdrawal seizures or DT's.  - Agree with [facility] protocol with valium  - Agree with folic acid/thiamine replacement    HTN. Longstanding history. PTA managed on metoprolol, lisinopril, HZTZ, and clonidine. Pressures elevated on admission to 126/97, likeley secondary to acute withdrawal as well as stress response. Denies HA, chest pain, or sob. Have come down nicely over the past 24 hours, with most recent readong of 128/84. No changes in outpatient medication regimen.   - Continue outpatient regimen which includes clonidine 0.1 mg BID, HCTZ 25 mg QD, Lisinopril 20 mg QD, and metoprolol 50 mg BID.  - Follow up with PCP for long-term management.       Currently, medically stable and I will be happy to follow up and see again for any intercurrent medical issues. Thank you for the opportunity to be a part of this patient's care.      Ashtyn Nichole PA-C  Internal Medicine [facility]  [phone]  January 3, 2017

## 2017-01-03 NOTE — PROGRESS NOTES
01/02/17 1955   Patient Belongings   Did you bring any home meds/supplements to the hospital?  No   Patient Belongings other (see comments)   Disposition of Belongings storage bin, locked drawer, security   Belongings Search Yes   Clothing Search Yes   Second Staff hugo    storage bin: shoes, jacket, belt  Locked drawer: cell phone, wallet  Security#[id number]: $25, [place] ID, visa, 2 [employer]  ADMISSION:  I am responsible for any personal items that are not sent to the safe or pharmacy. [facility] is not responsible for loss, theft or damage of any property in my possession.    Patient Signature _____________________ Date/Time _____________________    Staff Signature _______________________ Date/Time _____________________    2nd Staff person, if patient is unable/unwilling to sign  ___________________________________ Date/Time _____________________  DISCHARGE:  All personal items have been returned to me.    Patient Signature _____________________ Date/Time _____________________    Staff Signature _______________________ Date/Time _____________________

## 2017-01-04 NOTE — DISCHARGE SUMMARY
COMBINATION HISTORY AND PHYSICAL AND DISCHARGE SUMMARY       IDENTIFYING INFORMATION:  Rashawn Short is a 56-year-old  male who has 3 kids, he lives in a sober house.  He is an  but works in his own company.      CHIEF COMPLAINT:  Relapse.      HISTORY OF PRESENT ILLNESS:  The patient was here, has a longstanding history of alcohol dependence.  He was sober for 3 years and relapsed 3-6 weeks.  He sober since 04/2013 and then relapsed.  He has been drinking in a sober house.  His family brought him here.  He has been drinking approximately 2 quart of vodka.  He has tolerance, withdrawal, history of progressive loss of control, used despite negative consequences to his family.  He does not use any drugs, does not smoke, does not cuellar.  He was previously on Antabuse and naltrexone, but continued to use.  He denies any depression, anxiety, psychosis, candelaria.      PAST PSYCHIATRIC HISTORY:  Psychiatrically hospitalized once.  At that time his wife was contemplating a divorce and then he became suicidal.      The patient denies any symptoms of candelaria.      FAMILY HISTORY:  Denied any family psychiatric or chemical dependency history.        SOCIAL HISTORY:  He lives with his wife.  He has 3 children.  He has a law degree.  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        MEDICAL HISTORY:  The patient's vitals are as below:  Temperature of 98.1, pulse of 58, heart rate of 88, respiratory rate 16, blood pressure 128/84.      MENTAL STATUS EXAMINATION:  The patient is a 56-year-old  male who appears his stated age.  He has adequate grooming, adequate hygiene, maintains good eye contact, cooperative.  No psychomotor abnormalities.  No gait problems.  Mood is anxious.  Affect is congruent.  Speech is spontaneous, normal rate, did not have any suicidal or homicidal ideation, denied auditory or visual hallucinations.      DIAGNOSES:   Axis I:  Alcohol withdrawal and alcohol  dependence.        PLAN:  The patient will be detoxed off alcohol.      HOSPITAL COURSE:  During the hospital course the patient was detoxed off alcohol using [facility] protocol and he is anticipated to come out of detox.  He had lab work done, which was normal comprehensive metabolic panel.  AST is 48.  He was continued on 4 medications for his blood pressure including clonidine, hydrochlorothiazide, lisinopril and metoprolol.  The patient was anticipated to come out of detox and will be discharged home.      DISCHARGE MENTAL STATUS EXAMINATION:  The patient is alert, oriented x3.  Recent and remote memory, language, fund of knowledge are all adequate.  The patient does not have any suicidal or homicidal ideation, plan or intent.       Primary Provider: You have stated that you will follow up as able with [facility], once secured in treatment.   Care Giver: Dr. Ludwig HARPER Appointment Date/Time: 17; Office Number: [phone]    DEON WILCOX MD             D: 2017 12:21   T: 2017 14:57   MT: CASI      Name:     LATRICE ROME   MRN:      [id number]        Account:        [id number]   :      1960           Admit Date:                                       Discharge Date: 2017      Document: [id number]

## 2017-02-08 ENCOUNTER — HOSPITAL ENCOUNTER (OUTPATIENT)
Dept: BEHAVIORAL HEALTH | Facility: CLINIC | Age: 57
Discharge: HOME | End: 2017-02-08
Attending: SOCIAL WORKER | Admitting: SOCIAL WORKER
Payer: COMMERCIAL

## 2017-02-08 VITALS
WEIGHT: 194.8 LBS | BODY MASS INDEX: 27.27 KG/M2 | HEIGHT: 71 IN | DIASTOLIC BLOOD PRESSURE: 109 MMHG | SYSTOLIC BLOOD PRESSURE: 174 MMHG | HEART RATE: 105 BPM

## 2017-02-08 PROCEDURE — H0001 ALCOHOL AND/OR DRUG ASSESS: HCPCS

## 2017-02-08 ASSESSMENT — ANXIETY QUESTIONNAIRES
7. FEELING AFRAID AS IF SOMETHING AWFUL MIGHT HAPPEN: NOT AT ALL
5. BEING SO RESTLESS THAT IT IS HARD TO SIT STILL: NOT AT ALL
4. TROUBLE RELAXING: SEVERAL DAYS
GAD7 TOTAL SCORE: 3
3. WORRYING TOO MUCH ABOUT DIFFERENT THINGS: SEVERAL DAYS
6. BECOMING EASILY ANNOYED OR IRRITABLE: NOT AT ALL
2. NOT BEING ABLE TO STOP OR CONTROL WORRYING: NOT AT ALL
1. FEELING NERVOUS, ANXIOUS, OR ON EDGE: SEVERAL DAYS

## 2017-02-08 ASSESSMENT — PAIN SCALES - GENERAL: PAINLEVEL: NO PAIN (0)

## 2017-02-08 NOTE — PROGRESS NOTES
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 concerned about the safety of anyone else who lives with you? No    6A. Do you have children who live with you?     No    6B. Do you have children who do not live with you?     Arelis- 26  Chip-23  Bárbara- 21    7A. Who supports you in making changes in your alcohol or drug use? What are they willing to do to support you? Who is upset or angry about you making changes in your alcohol or drug use? How big a problem is this for you?      \"everybody. My wife, my kids, my mom, my dad, my two sisters, my brother, my friends.\"    7B. This table is provided to record information about the person s relationships and available support It is not necessary to ask each item; only to get a comprehensive picture of their support system.  How often can you count on the following people when you need someone?   Partner / Spouse Usually supportive   Parent(s)/Aunt(s)/Uncle(s)/Grandparents Always supportive   Sibling(s)/Cousin(s) Usually supportive   Child(mayur) Always supportive   Other relative(s) Usually supportive   Friend(s)/neighbor(s) Usually supportive   Child(mayur) s father(s)/mother(s) N/A   Support group member(s) Always supportive   Community of shahnaz members Always supportive   Social " worker/counselor/therapist/healer N/A   Other (specify) N/A     8A. What is your current living situation?     He is currently living with his wife    8B. What is your long term plan for where you will be living?     A sober house    8C. Tell me about your living environment/neighborhood? Ask enough follow up questions to determine safety, criminal activity, availability of alcohol and drugs, supportive or antagonistic to the person making changes.      No concerns    9. Criminal justice history: Gather current/recent history and any significant history related to substance use--Arrests? Convictions? Circumstances? Alcohol or drug involvement? Sentences? Still on probation or parole? Expectations of the court? Current court order? Any sex offenses - lifetime? What level? (DSM)    None    10. What obstacles exist to participating in treatment? (Time off work, childcare, funding, transportation, pending MCC time, living situation)     None    Dimension VI Ratings   Recovery environment - The placing authority must use the criteria in Dimension VI to determine a client s recovery environment.   RISK DESCRIPTIONS - Severity ratin Client is engaged in structured, meaningful activity, but peers, family, significant other, and living environment are unsupportive, or there is criminal justice involvement by the client or among the client s peers, significant others, or in the client s living environment.    REASONS SEVERITY WAS ASSIGNED - (What support does the person have for making changes? What structure/stability does the person have in his or her daily life that will increase the likelihood that changes can be sustained? What problems exist in the person s environment that will jeopardize getting/staying clean and sober?)     The patient has been living with his wife for the past 8 days.  Prior to that, he lived in a sober house for 3 years.  The patient denied having any concerns regarding his immediate living  environment or neighborhood.  The patient reported having relationship conflict with his family due to his ongoing alcohol abuse issues.  The patient identified as being heterosexual and he reported being  for the past 30 years.  The patient denied having a history of legal issues.  The patient reported that most of his use of alcohol had been done alone.  The patient is unemployed and he last worked a formal job a couple of years ago.  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 The patient denied having any increased financial stress.  The patient has a current sober peer support network.         Client Choice/Exceptions   Would you like services specific to language, age, gender, culture, Sabianist preference, race, ethnicity, sexual orientation or disability?  No    What particular treatment choices and options would you like to have? Residential tx    Do you have a preference for a particular treatment program? [facility]    Criteria for Diagnosis     Criteria for Diagnosis  DSM-5 Criteria for Substance Use Disorder  Instructions: Determine whether the client currently meets the criteria for Substance Use Disorder using the diagnostic criteria in the DSM-V pp.485-580. Current means during the most recent 12 months outside a facility that controls access to substances    Category of Substance Severity (ICD-10 Code / DSM 5 Code)     Alcohol Use Disorder Severe  (10.20) (303.90)   Cannabis Use Disorder NA   Hallucinogen Use Disorder NA   Inhalant Use Disorder NA   Opioid Use Disorder NA   Sedative, Hypnotic, or Anxiolytic Use Disorder NA   Stimulant Related Disorder NA   Tobacco Use Disorder NA   Other (or unknown) Substance Use Disorder NA       Collateral Contact Summary   Number of contacts made: 1    Contact with referring person:  NA.    If court related records were reviewed, summarize here: NA    Information from collateral contacts supported/largely agreed with  information from the client and associated risk ratings.      Rule 25 Assessment Summary and Plan   's Recommendation    1)  Complete a residential based or similar treatment program, such as [facility].   2)  Abstain from all mood-altering chemicals unless prescribed by a licensed provider.   3)  Attend, at minimum, 2 weekly 12-step support group meetings.     4)  Actively work with your male sponsor on a weekly basis.   5)  Follow all the recommendations of your treatment/medical providers.      Collateral Contacts     Name:    [facility]   Relationship:    [facility]   Phone Number:     Releases:         The patient's medical record at [facility] was reviewed and the information contained in the medical record supported the patient's account of his chemical use history and chemical use consequences.      Collateral Contacts     Name:    Ora Short   Relationship:    wife   Phone Number:    [phone]   Releases:    Yes     Collateral data had not yet been obtained from this contact at the time of this documentation.    ollateral Contacts      A problematic pattern of alcohol/drug use leading to clinically significant impairment or distress, as manifested by at least two of the following, occurring within a 12-month period:  7/11    Alcohol/drug is often taken in larger amounts or over a longer period than was intended.  A great deal of time is spent in activities necessary to obtain alcohol, use alcohol, or recover from its effects.  Craving, or a strong desire or urge to use alcohol/drug  Continued alcohol use despite having persistent or recurrent social or interpersonal problems caused or exacerbated by the effects of alcohol/drug.  Alcohol/drug use is continued despite knowledge of having a persistent or recurrent physical or psychological problem that is likely to have been caused or exacerbated by alcohol.  Tolerance, as defined by either of the following: A need for markedly  increased amounts of alcohol/drug to achieve intoxication or desired effect.  Withdrawal, as manifested by either of the following: The characteristic withdrawal syndrome for alcohol/drug (refer to Criteria A and B of the criteria set for alcohol/drug withdrawal).      Specify if: In early remission:  After full criteria for alcohol/drug use disorder were previously met, none of the criteria for alcohol/drug use disorder have been met for at least 3 months but for less than 12 months (with the exception that Criterion A4,  Craving or a strong desire or urge to use alcohol/drug  may be met).     In sustained remission:   After full criteria for alcohol use disorder were previously met, non of the criteria for alcohol/drug use disorder have been met at any time during a period of 12 months or longer (with the exception that Criterion A4,  Craving or strong desire or urge to use alcohol/drug  may be met).   Specify if:   This additional specifier is used if the individual is in an environment where access to alcohol is restricted.    Mild: Presence of 2-3 symptoms    Moderate: Presence of 4-5 symptoms    Severe: Presence of 6 or more symptoms

## 2017-02-08 NOTE — PROGRESS NOTES
"[facility]  [address] [place] [id number]               ADULT CD ASSESSMENT      Additional Clinical Questions - Outpatient    Patient Name: Rashawn Short  Cell Phone:   [phone]    Email:  [email or web address]    Emergency Contact: Ora Short (wife) Tel: [phone]    ________________________________________________________________________      The patient is      With which race do you identify? White    Please list your family members and if they are living or , i.e. (grandparents, parents, step-parents, adoptive parents, number of siblings, half-siblings, etc.)     Mother   Living Father Living   No Step-mother   NA No Step-father NA   Maternal Grandmother    Fraternal Grandmother    Maternal Grandfather     Fraternal Grandfather    2 Sister(s) Living 1 Brother(s)   Living   No Half-sister(s)   NA No Half-brother(s) NA             Who raised you? (parents, grandparents, adoptive parents, step-parents, etc.)    Both Parents    Have any of your family members or significant others had problems with mental illness or substance abuse?  Please explain.    no    Do you have any children or Stepchildren? Yes, please explain: 3 adults    Are you being investigated by Child Protection Services? No    Do you have a child protection worker, probation office or ? No    How would you describe your current finances?  Just making it    If you are having problems, (unpaid bills, bankruptcy, IRS problems) please explain:  No    If working or a student are you able to function appropriately in that setting? Yes    Describe your preferred learning style:  by hands-on practice    What personal strengths do you have that can help you get sober?  \"God, family, self-worth, self-esteem.\"    Do you currently self-administer your medications?  Yes    Have you ever:    Had to lie to people important to you about how much you cuellar?     No   "   Felt the need to bet more and more money?      No     Attempted treatment for a gambling problem?        No     Touched or fondled someone else inappropriately, or forced them to have sex with you against their will?       No     Are you or have you ever been a registered sex offender?        No     Is there any history of sexual abuse in your family?        No     Ross obsessed by your sexual behavior (having sex with many partners, masturbating often, using pornography often?        No     Received therapy or stayed in the hospital for mental health problems?        No     Hurt yourself (cutting, burning or hitting yourself)?        No     Purged, binged or restricted yourself as a way to control your weight?      No       Are you on a special diet?       No       Do you have any concerns regarding your nutritional status?        No       Have you had any appetite changes in the last 3 months?        No       Have you had any weight loss or weight gain in the last 3 months?  If yes, how much gain or loss:     If weight patient gains more than 10 lbs or loses more than 10 lbs, refer to program RN /  Attending Physician for assessment.    No        Was the patient informed of BMI?      Above,  General nutrition education   Yes     Do you have any dental problems?        No     Lived through any trauma or stressful events?        Yes, If yes explain: Ora's father passed away 9 years ago.     In the past month, have you had any of the following symptoms related to the trauma listed above? (Dreams, intense memories, flashbacks, physical reactions, etc.)         No     Believed that people are spying on you, or that someone was plotting against you or trying to hurt you?       No     Believed that someone was reading your mind or could hear your thoughts or that you could actually read someone's mind, hear what another person was thinking?       No     Believed that someone or some force outside of yourself put  "thoughts in your mind that were not your own, or made you act in a way that was not your usual self?  Or have you ever thought you were possessed?         No     Believed that you were being sent special messages through the TV, radio or newspaper?         No     [place] things other people couldn't hear, such as voices?         No     Had visions when you were awake?  Or have you ever seen things other people couldn't see?       No         Suicide Screening Questions:    1. Are you feeling hopeless about the present/future?   No   2. Have you ever had thoughts about taking your life?   No   3. When did you have these thoughts? NA   4. Do you have any current intent or active desire to take your life?   No   5. Do you have a plan to take your life?    No   6. Have you ever made a suicide attempt?   No   7. Do you have access to pills, guns or other methods to kill yourself?   No       Risk Status - Use as Guide/Example    Ideation - Active  Thoughts of suicide Intent to follow  Through on suicide Plan for completing  suicide    Yes No Yes No Yes No   Emergent X  X  X    Urgent / Non-Emergent X  X   X   Non-Urgent X   X  X   No Current / Active Risk (Past 6 Months)  X  X  X   Rashawn Short No No No       Additional Risk Factors: No addtional risk factors   Protective Factors:  Having people in the his/her life who would prevent the patient from considering comminting suicide (i.e. young children, spouse, parents, etc.)  An absense of mental health issues or stable and well treated mental health issues  Having easy access to supportive family members  Having a good community support network  Having cultural, Episcopal or spiritual beliefs that discourage suicide     Risk Status:    Emergent? No  Urgent / Non-Emergent?  No  Present / Non- Urgent? No      No Current Risk? Yes, Evaluation Counselors - Document in Epic / SBAR to counselor \"No identified risk\" and Treatment Counselors - Assess weekly in progress notes under " Dimension 3 and summarize in Discharge / Treatment summary under Dimension 3.    Additional information to support suicide risk rating: See Above    Mental Status Assessment    Physical Appearance/Attire:  Appears stated age  Hygiene:  well groomed  Eye Contact:  at examiner  Speech:  regular  Speech Volume:  regular  Speech Quality: fluid  Cognitive/Perceptual:  reality based  Cognition:  memory intact   Judgment:  intact  Insight:  intact  Orientation:  time, place, person and situation  Thought:  logical   Hallucinations:  none  General Behavioral Tone:  cooperative  Psychomotor Activity:  no problem noted  Gait:  no problem  Mood:  normal and appropriate  Affect:  congruence/appropriate    Counselor Notes: NA    Criteria for Diagnosis  DSM-5 Criteria for Substance Abuse    303.90 (F10.20) Alcohol Use Disorder Severe    LEVEL OF CARE    Intoxication and Withdrawal: 0  Biomedical:  1  Emotional and Behavioral:  1  Readiness to Change:  0  Relapse Potential: 4  Recovery Environmental:  2    Initial problem list:    The patient lacks relapse prevention skills  The patient has poor coping skills  The patient has poor refusal skills     Patient/Client is willing to follow treatment recommendations.  Yes    Chante Denton, [facility]     Vulnerable Adult Checklist for LODGING:     This LODGING patient, or other Residential/Lodging CD Treatment patient is a categorical Vulnerable Adult according to [place] Statute 626.5572 subdivision 21.    Susceptibility to abuse by others     1.  Have you ever been emotionally abused by anyone?          No    2.  Have you ever been bullied, or physically assaulted by anyone?        No    3.  Have you ever been sexually taken advantage of or sexually assaulted?        No    4.  Have you ever been financially taken advantage of?        No    5.  Have you ever hurt yourself intentionally such as burns or cuts?       No    Risk of abusing other vulnerable adults     1.  Have you  ever bullied, berated or emotionally degraded someone else?       No    2.  Have you ever financially taken advantage of someone else?       No    3.  Have you ever sexually exploited or assaulted another person?       No    4.  Have you ever gotten into fights, verbal arguments or physically assaulted someone?          No    Based on the above information:    This [facility] patient, or other Residential/Lodging CD Treatment patient is a categorical Vulnerable Adult according to [facility] Statue 626.5572 subdivision 21.

## 2017-02-09 ASSESSMENT — ANXIETY QUESTIONNAIRES: GAD7 TOTAL SCORE: 3

## 2017-02-09 ASSESSMENT — PATIENT HEALTH QUESTIONNAIRE - PHQ9: SUM OF ALL RESPONSES TO PHQ QUESTIONS 1-9: 1

## 2017-02-10 NOTE — PROGRESS NOTES
"CHEMICAL DEPENDENCY ASSESSMENT      EVALUATION COUNSELOR:  Chante Denton MA, [facility].   DATE OF EVALUATION:  02/08/2017   PATIENT'S ADDRESS:  [address] [place] [address].   PHONE NUMBER:  [phone].   STATISTICS:  YOB: 1960.  Age:  56.  Gender:  Male.  Marital Status:  .   PATIENT'S INSURANCE:  [facility] and [employer] [facility].   REFERRAL SOURCE:  Self.      REASON FOR EVALUATION:  Mr. Rashawn Short presents to [facility] for evaluation of possible chemical dependency.  The reason for the CD evaluation was due to the patient's own awareness that he needed help with his \"alcohol dependency.\"  He stated he needs residential treatment because he has not been able to maintain full abstinence from alcohol since detox on 01/03/2017.      HEALTH HISTORY AND MEDICATIONS:  The patient reports having high blood pressure.  He takes folic acid, lisinopril, hydrochlorothiazide, metoprolol, multivitamin, aspirin, Thiamine, fish oil and clonidine.       HISTORY OF PREVIOUS TREATMENT AND COUNSELING:  The patient reports attending and completing 8 CD treatments from 0705-0323.      HISTORY OF ALCOHOL AND DRUG USE:    Alcohol:  Age of first use, high school. Between the ages of 24 and 49, he stated there are no concerns about his drinking. During that time, he was just drinking beer.  In 03/2011, he first tried vodka and fell in love with the feeling.  He began drinking daily very quickly after he started drinking vodka.  In 2012 and 2013 he was drinking 1-1/2 quarts of vodka per day.  4/21/2013 through fall 2016 he was sober.  In fall 2016 he started drinking about 5 days per week 3-4 drinks per day because he lived in a sober house.  He left sober house about 8 days ago.  His last use was 02/07/2017, 70% of a pint.      SUMMARY OF CHEMICAL DEPENDENCY SYMPTOMS ACKNOWLEDGED BY THE PATIENT:  The patient identifies with 7 " of the 11 DSM-V criteria for diagnostic impression of substance use disorder.      SUMMARY OF COLLATERAL DATA:  The patient's medical record at [facility] was reviewed and the information contained in the medical record supported the patient's account of his chemical use history.      VULNERABLE ADULT ASSESSMENT:  This [facility] patient or other residential/lodging CD treatment patient is a categorical vulnerable adult according to [place] statute 626.5572, subdivision 21.      IMPRESSION:  Alcohol use disorder, severe, 303.90/F10.20.      [facility] PLACEMENT CRITERIA:   DIMENSION 1:  Acute intoxication/withdrawal potential:  Risk level 0.  The patient reports his last use of alcohol was on 02/07/2017.  The patient displays no intoxication or withdrawal symptomology at this time.  The patient denies having any feelings of withdrawal.  The patient was given a breathalyzer during his evaluation.  The patient's blood alcohol content was 0.      DIMENSION 2:  Biomedical conditions and complications:  Risk level 1.  The patient denies having any chronic biomedical conditions that interfere with treatment or any other recovery skills training or workshop.  The patient reports having a high blood pressure.  He reports taking metoprolol, lisinopril and hydroxyzine.  At the time of the CD evaluation, the patient's blood pressure was 174/109 and his pulse was 105 beats per minute.  Patient's BMI score was 27.17, placing him in the overweight category.  Patient denies having pain at this time and reports his pain level is a 0 on a 0-10 pain rating scale.  The patient denies smoking any cigarettes.      DIMENSION 3:  Emotional/behavioral conditions and complications:  Risk level 1.  The patient denies having a mental health diagnosis and denies taking any medications for his mental health.  The patient is struggling with concentration in the past few weeks.  He stated his mind  "will sometimes wander while reading.  The patient describes his childhood as \"great.\"  He denies a history of abuse.  At the time of the assessment, the patient's PHQ-9 score was 1, minimal depression, and his SERGIO-7 score was 3, minimal anxiety.  The patient has minimal emotional and stress management skills.  The patient denies any self-injurious behavior, suicide attempts, or suicidal thoughts at this time.      DIMENSION 4:  Readiness for change:  Risk level 0.  The patient admits he has a problem with alcohol and wants to stop drinking for good.  He appears to be ready to take his addiction seriously and is motivated to make the necessary changes for lifelong recovery.  The patient appears to be in the preparation stage within the stage of change model.      DIMENSION 5:  Relapse, continued use potential:  Risk level 4.  The patient has attended and completed 8 CD treatments between 2011 and 2013.  The patient has actively attending AA meetings and the last meeting he attended was yesterday, but he drank before he went.  The patient reported having 3 years of being sober from 0874-8575.  He identified his triggers as a stressful marriage, living in a sober house and using public transportation full time.  The patient has minimal impulse control along with sober coping skills.  The patient has minimal insight into the effects his use has had on his physical and mental health.  The patient is a high risk for relapse.      DIMENSION 6:  Recovery environment:  Risk level 2.  The patient has been living with his wife for the past 8 days.  Prior to that, he was living in a sober house for 3 years.  The patient denied having any concerns regarding his immediate living environment or neighborhood.  The patient reported having relationship conflict with his family due to his ongoing alcohol abuse issues.  The patient identified as being heterosexual and he reported being  for the past 30 years.  The patient denied " having any history of legal issues.  The patient reported that most of his use of alcohol has been done alone.  The patient is unemployed and he last worked a formal job a couple of years ago.  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  The patient denied having any increased financial stress.  The patient has a current sober peer support network.      RECOMMENDATIONS:   1.  Complete a residential based or similar treatment program such as [facility], [facility].   2.  Abstain from all mood-altering chemicals unless prescribed by a licensed provider.   3.  Attend at minimum 2 weekly 12-step support group meetings.   4.  Actively work with your male sponsor on a weekly basis.   5.  Follow all recommendations of your treatment and medical providers.      INITIAL PROBLEM LIST:   1.  The patient lacks relapse prevention skills.   2.  The patient has poor coping skills.   3.  The patient has poor refusal skills.   4.  The patient has dual issues of MI and CD.         This information has been disclosed to you from records protected by Federal confidentiality rules (42 CFR part 2). The Federal rules prohibit you from making any further disclosure of this information unless further disclosure is expressly permitted by the written consent of the person to whom it pertains or as otherwise permitted by 42 CFR part 2. A general authorization for the release of medical or other information is NOT sufficient for this purpose. The Federal rules restrict any use of the information to criminally investigate or prosecute any alcohol or drug abuse patient.      [facility]             D: 02/10/2017 09:17   T: 02/10/2017 09:40   MT: JJ      Name:     LATRICE ROME   MRN:      [id number]        Account:      [id number]   :      1960           Visit Date:   2017      Document: [id number]

## 2017-02-13 ENCOUNTER — HOSPITAL ENCOUNTER (OUTPATIENT)
Dept: BEHAVIORAL HEALTH | Facility: CLINIC | Age: 57
End: 2017-02-13
Attending: PSYCHIATRY & NEUROLOGY
Payer: COMMERCIAL

## 2017-02-13 VITALS — SYSTOLIC BLOOD PRESSURE: 164 MMHG | TEMPERATURE: 97.6 F | DIASTOLIC BLOOD PRESSURE: 113 MMHG | HEART RATE: 101 BPM

## 2017-02-13 LAB — BENZODIAZ UR QL: NORMAL

## 2017-02-13 PROCEDURE — 80307 DRUG TEST PRSMV CHEM ANLYZR: CPT | Performed by: PSYCHIATRY & NEUROLOGY

## 2017-02-13 PROCEDURE — 10020000 ZZH LODGING PLUS FACILITY CHARGE ADULT: Mod: GA

## 2017-02-13 PROCEDURE — H2035 A/D TX PROGRAM, PER HOUR: HCPCS | Mod: HQ

## 2017-02-13 RX ORDER — MAGNESIUM HYDROXIDE/ALUMINUM HYDROXICE/SIMETHICONE 120; 1200; 1200 MG/30ML; MG/30ML; MG/30ML
30 SUSPENSION ORAL EVERY 6 HOURS PRN
COMMUNITY
End: 2017-03-11

## 2017-02-13 RX ORDER — AMOXICILLIN 250 MG
1 CAPSULE ORAL DAILY
COMMUNITY
End: 2017-03-11

## 2017-02-13 RX ORDER — LORATADINE 10 MG/1
10 TABLET ORAL DAILY PRN
COMMUNITY
End: 2017-03-11

## 2017-02-13 RX ORDER — SUCRALFATE ORAL 1 G/10ML
1 SUSPENSION ORAL 2 TIMES DAILY
COMMUNITY
End: 2017-02-15

## 2017-02-13 ASSESSMENT — ANXIETY QUESTIONNAIRES
3. WORRYING TOO MUCH ABOUT DIFFERENT THINGS: NOT AT ALL
7. FEELING AFRAID AS IF SOMETHING AWFUL MIGHT HAPPEN: NOT AT ALL
6. BECOMING EASILY ANNOYED OR IRRITABLE: NOT AT ALL
2. NOT BEING ABLE TO STOP OR CONTROL WORRYING: NOT AT ALL
GAD7 TOTAL SCORE: 1
4. TROUBLE RELAXING: NOT AT ALL
5. BEING SO RESTLESS THAT IT IS HARD TO SIT STILL: NOT AT ALL
1. FEELING NERVOUS, ANXIOUS, OR ON EDGE: SEVERAL DAYS

## 2017-02-13 NOTE — PROGRESS NOTES
Initial Services Plan        Before your first treatment group, please do the following    Immediate health & safety concerns: Look for sober housing and a supportive social network.  Obtain personal items (glasses, hearing aides, medicines, diabetes supplies, clothing, etc.).  Look for a support network (such as AA, NA, DBT group, a Catholic group, etc.)    Suggestions for client during the time between intake & completion of treatment plan:  Tour your treatment center (unit or outpatient clinic).  Introduce yourself to the treatment group.  Spend time getting to know your peers.  Complete your psycho-social paperwork.  Complete the problem list for your treatment plan.  Start drug and alcohol use history.  Review your patient or client handbook.  Identify concerns about whom to ask for family week    Client issues to be addressed in the first treatment sessions:  Identify concerns about coming into treatment, i.e. fear of failing again, sharing a room in treatment      SAYDA Champion  2/13/2017  7:42 AM

## 2017-02-13 NOTE — PROGRESS NOTES
This [facility] patient, or other Residential/Lodging CD Treatment patient is a categorical Vulnerable Adult according to [place] Statute 626.5572 subdivision 21.     Susceptibility to abuse by others      1. Have you ever been emotionally abused by anyone?   No     2. Have you ever been bullied, or physically assaulted by anyone?  No     3. Have you ever been sexually taken advantage of or sexually assaulted?  No     4. Have you ever been financially taken advantage of?  No     5. Have you ever hurt yourself intentionally such as burns or cuts?  No     Risk of abusing other vulnerable adults      1. Have you ever bullied, berated or emotionally degraded someone else?  No     2. Have you ever financially taken advantage of someone else?  No     3. Have you ever sexually exploited or assaulted another person?  No     4. Have you ever gotten into fights, verbal arguments or physically assaulted someone?   No     Based on the above information:     This [facility] patient, or other Residential/Lodging CD Treatment patient is a categorical Vulnerable Adult according to [facility] Statue 626.5572 subdivision 21.

## 2017-02-13 NOTE — PROGRESS NOTES
LP Admission Update    Date of update:     February 13, 2017 Update Evaluation Counselor:      Chante Denton     Date of original Rule 25:    2/8/2017    Original Evaluation Counselor:      Chante Senior MA [facility]       PHQ-9  Score 10 or greater No   SERGIO-7  Score 10 or greater No   Patient has suicidal ideation No   Patient needs medication adjustment No   Patient has worsening depression No   Patient has candelaria No   Patient has unmanageable anxiety, behavioral interventions have not been successful No   Patient wants to be seen for Naltrexone or Antabuse Yes   *Patient has seen Dr. Hamlin on 3A/psych/medical floor.  (if yes, the patient will need a follow-up) No   Transfer from in-house medical or psych unit No   Patients with a mental health diagnosis coming from [facility] without any medications No   Patient with a history of anxiety whose DOC was methamphetamine, cocaine or crack No   Other clinical justification for a mental health evaluation  Please explain: He is requesting a MH eval.   Yes   If the patient responded yes to any of the above questions except for *, then the patient would need to be scheduled for an initial mental health screen.      Suicide Screening Questions:    1. Are you feeling hopeless about the present/future?   No   2. Have you ever had thoughts about taking your life?   No   3. When did you have these thoughts? NA   4. Do you have any current intent or active desire to take your life?   No   5. Do you have a plan to take your life?    No   6. Have you ever made a suicide attempt?   No   7. Do you have access to pills, guns or other methods to kill yourself?   No       Risk Status - Use as Guide/Example    Ideation - Active  Thoughts of suicide Intent to follow  Through on suicide Plan for completing  suicide    Yes No Yes No Yes No   Emergent X  X  X    Urgent / Non-Emergent X  X   X   Non-Urgent X   X  X   No Current / Active Risk (Past 6 Months)  X  X  X   Rashawn Short  "No No No       Additional Risk Factors: No addtional risk factors   Protective Factors:  Having people in the his/her life who would prevent the patient from considering comminting suicide (i.e. young children, spouse, parents, etc.)  An absense of mental health issues or stable and well treated mental health issues  An absense of chronic health problems or stable and well treated chronic health issues  Having easy access to supportive family members  Having a good community support network  Having cultural, Christianity or spiritual beliefs that discourage suicide     Risk Status:    Emergent? No  Urgent / Non-Emergent?  No  Present / Non- Urgent? No      No Current Risk? Yes, Evaluation Counselors - Document in Epic / SBAR to counselor \"No identified risk\" and Treatment Counselors - Assess weekly in progress notes under Dimension 3 and summarize in Discharge / Treatment summary under Dimension 3.    Additional information to support suicide risk rating: See Above        Current GERBER:       .000   Current UA:   Negative     Alcohol/Drug use since last CD evaluation (include date and time of last use):   Alcohol: 2/11/2017 and had 3 drinks     Please note any other clinical changes since the last CD evaluation (such as medication changes, additional legal charges, detoxification admissions, overdoses, etc):    No significant changes since the last CD evaluation       Current ASAM Dimensions      Intoxication and Withdrawal: 0  Biomedical:  1  Emotional and Behavioral:  1  Readiness to Change:  0  Relapse Potential: 4  Recovery Environmental:  2        "

## 2017-02-13 NOTE — PROGRESS NOTES
2/13/2017   Counselor gave pt goal list, safety plan, and first assignment today.     SAYDA Mcneil

## 2017-02-13 NOTE — PROGRESS NOTES
Name: Rashawn Short  Date: 2/13/2017  Medical Record: [id number]    Envelope Number: [id number]    List of Contents (List each item separately in new row):   Omega 3 bottle; multivitamin bottle; B-1 100 mg bottle    Admission:  I am responsible for any personal items that are not sent to the safe or pharmacy.  [facility] is not responsible for loss, theft or damage of any property in my possession.      Patient Signature:  ___________________________________________       Date/Time:__________________________    Staff Signature: __________________________________       Date/Time:__________________________    2nd Staff person, if patient is unable/unwilling to sign:      __________________________________________________________       Date/Time: __________________________      Discharge:  [facility] has returned all of my personal belongings:    Patient Signature: ________________________________________     Date/Time: ____________________________________    Staff Signature: ______________________________________     Date/Time:_____________________________________

## 2017-02-14 ENCOUNTER — HOSPITAL ENCOUNTER (OUTPATIENT)
Dept: BEHAVIORAL HEALTH | Facility: CLINIC | Age: 57
End: 2017-02-14
Attending: PSYCHIATRY & NEUROLOGY
Payer: COMMERCIAL

## 2017-02-14 PROCEDURE — 10020000 ZZH LODGING PLUS FACILITY CHARGE ADULT: Mod: GA

## 2017-02-14 PROCEDURE — H2035 A/D TX PROGRAM, PER HOUR: HCPCS | Mod: HQ

## 2017-02-14 ASSESSMENT — ANXIETY QUESTIONNAIRES: GAD7 TOTAL SCORE: 1

## 2017-02-14 ASSESSMENT — PATIENT HEALTH QUESTIONNAIRE - PHQ9: SUM OF ALL RESPONSES TO PHQ QUESTIONS 1-9: 2

## 2017-02-14 NOTE — PROGRESS NOTES
Acknowledgement of Current Treatment Plan       I have reviewed my treatment plan with my therapist / counselor on 2/14/17. I agree with the plan as it is written in the electronic health record.    Name Signature   Rashawn Short    Name of Therapist / Counselor    Lauren GuTtfhsejwty-FGB-R

## 2017-02-14 NOTE — PROGRESS NOTES
Acknowledgement of Current Treatment Plan       I have reviewed my treatment plan with my therapist / counselor on 2/13/17. I agree with the plan as it is written in the electronic health record.    Name Signature   Rashawn Short    Name of Therapist / Counselor    Lauren GuYlexunmatw-FXD-L

## 2017-02-14 NOTE — PROGRESS NOTES
Comprehensive Assessment Summary     Based on client interview, review of previous assessments and   comprehensive assessment interview the following diagnosis and recommendations are:     Patient: Rashawn Short  MRN; [id number]   : 1960  Age: 56 year old Sex: male       Client meets criteria for:  Alcohol Disorder Severe 303.90 (F.10.20)    Dimension One: Acute Intoxication/Withdrawal Potential     Ratin     (Consider the client's ability to cope with withdrawal symptoms and current state of intoxication)     Patient reports that his last use date was 17.  Patient reports no withdrawal symptomology at this time.    Dimension Two: Biomedical Condition and Complications    Ratin  (Consider the degree to which any physical disorder would interfere with treatment for substance abuse, and the client's ability to tolerate any related discomfort; determine the impact of continued chemical use on the unborn child if the client is pregnant)   Patient reports having high blood pressure and is currently taking Metoprolol, Lisinopril, and Hydroxyzine to address his condition. Patient reports that he struggles with concentration. Patient reports no bio medical conditions that would interfere with his treatment protocol.    Dimension Three: Emotional/Behavioral/Cognitive Conditions & Complications    Ratin  (Determine the degree to which any condition or complications are likely to interfere with treatment for substance abuse or with functioning in significant life areas and the likelihood of risk of harm to self or others)     Patient reports feeling emotional pressures, such guilt, shame and stress due to his substance abuse, not having a job in his chosen career, and the problems in his marriage which using substances has caused. Patient reports no suicidal ideation at this time.    Dimension Four: Treatment Acceptance/Resistance     Ratin  (Consider the amount of support and encouragement  necessary to keep the client involved in treatment)     Patient appears internally and externally motivated to change as he expressed that he is now making a total commitment, which he did not do before. Patient reports that he's made a decision to totally surrender to the recovery process and that he has finally admitted to himself that he is powerless over his addiction. Patient appears to be in the preparation stage of change.    Dimension Five: Continued Use/Relaspe Prevention     Ratin  (Consider the degree to which the client's recognizes relapse issues and has the skills to prevent relapse of either substance use or mental health problems)    Patient has attended and completed eight treatments between  and . Patient reports having three years sober between  and . Patient reports that uncertainty, difficulty in his marriage, troubles with his career, and unstructured time are all key triggers to his relapses. Patient has minimal impulse control as well as minimal insight into his relapse problem potential and the effects his use has had on his physical and mental health.      Dimension Six: Recovery Environment     Ratin    (Consider the degree to which key areas of the client's life are supportive of or antagonistic to treatment participation and recovery)   Patient reports that he has no legals, will be returning home to live with his spouse and that he would like to go through our Phase 2 outpatient program. Patient reports that he would like to continue to develop his sober support network, go back to work, and address his issues with unstructured time.      I have reviewed the information on the assessment, psychosocial and medical history and checklist:        it is current

## 2017-02-14 NOTE — PROGRESS NOTES
Patient Safety Plan Template    Name:   Rashawn Short YOB: 1960 Age:  56 year old MR Number:  [id number]   Step 1: Warning signs (Thoughts, images, mood, situation, behavior) that a crisis may be developin. Pt did not answer     2. Pt did not answer     3. Pt did not answer     Step 2: Internal coping strategies - Things I can do to take my mind off of my problems without contacting another person (relaxation technique, physical activity):     1. Prayer     2. Read the Big Book     3. Talk to peers in the treatment program     Step 3: People and social settings that provide distraction:     1. Name: Pt did not answer   Phone: Pt did not answer   2. Name: Pt did not answer   Phone: Pt did not answer   3. Place: Pt did not answer   4. Place: Pt did not answer     The one thing that is most important to me and worth living for is: To please God and my family and to give back to others.      Step 4: People whom I can ask for help:     1. Name: Alberto Short   Phone: [phone]     2. Name: Arpan De Jesus   Phone: [phone]     3. Name: Trav Sadler   Phone: [phone]     Step 5: Professionals or agencies I can contact during a crisis:     1. Clinician Name: Dr. Igor Carrillo   Phone: [phone]   Clinician Pager or Emergency Contact #: Pt did not answer     2. Clinician Name: Pt did not answer   Phone: Pt did not answer     Clinician Pager or Emergency Contact #:Pt did not answer     3. Local Urgent Care Services: [facility]    Urgent Care Services Address: [address]    Urgent Care Services Phone: [phone]. Suicide Prevention Lifeline Phone: [phone] ([phone])     Step 6: Making the environment safe:     1. Pt did not answer     2. Pt did not answer     Safety Plan Template 2008 Barbara Cotton and Kareem Zuluaga is reprinted with the express permission of the authors.  No portion of the Safety Plan Template may be reproduced without the express, written  permission.  You can contact the authors at [email or web address] or [email or web address].

## 2017-02-15 ENCOUNTER — HOSPITAL ENCOUNTER (OUTPATIENT)
Dept: BEHAVIORAL HEALTH | Facility: CLINIC | Age: 57
End: 2017-02-15
Attending: PSYCHIATRY & NEUROLOGY
Payer: COMMERCIAL

## 2017-02-15 PROBLEM — F19.20 CHEMICAL DEPENDENCY (H): Status: ACTIVE | Noted: 2017-02-15

## 2017-02-15 PROCEDURE — H2035 A/D TX PROGRAM, PER HOUR: HCPCS | Mod: HQ

## 2017-02-15 PROCEDURE — 10020000 ZZH LODGING PLUS FACILITY CHARGE ADULT

## 2017-02-15 NOTE — PROGRESS NOTES
"D-Patient completed his \"Book of Me\" assignment and presented in group. Patient discussed his reasons for being in treatment, his healthy family composition before using and the turmoil his substance abused caused in his family afterwards. Patient discussed what he hopes to gain from treatment, what causes him the most happiness and what he feels his weak and strong points are.  I-Group therapy-Counselor reviewed group norms, facilitated group discussion and feedback session.  A-Patient appears to have a much better understanding of why he uses substances, the consequences of his use and more empowering beliefs that would result in better outcomes.  P-Continue with treatment and treatment plan objectives.  "

## 2017-02-16 ENCOUNTER — HOSPITAL ENCOUNTER (OUTPATIENT)
Dept: BEHAVIORAL HEALTH | Facility: CLINIC | Age: 57
End: 2017-02-16
Attending: PSYCHIATRY & NEUROLOGY
Payer: COMMERCIAL

## 2017-02-16 ENCOUNTER — OFFICE VISIT (OUTPATIENT)
Dept: BEHAVIORAL HEALTH | Facility: CLINIC | Age: 57
End: 2017-02-16
Payer: COMMERCIAL

## 2017-02-16 VITALS
RESPIRATION RATE: 16 BRPM | HEART RATE: 72 BPM | SYSTOLIC BLOOD PRESSURE: 102 MMHG | OXYGEN SATURATION: 100 % | WEIGHT: 194 LBS | BODY MASS INDEX: 27.06 KG/M2 | DIASTOLIC BLOOD PRESSURE: 66 MMHG | TEMPERATURE: 98.5 F

## 2017-02-16 DIAGNOSIS — F10.20 UNCOMPLICATED ALCOHOL DEPENDENCE (H): Primary | ICD-10-CM

## 2017-02-16 DIAGNOSIS — Z00.00 ROUTINE GENERAL MEDICAL EXAMINATION AT A HEALTH CARE FACILITY: ICD-10-CM

## 2017-02-16 PROCEDURE — 99214 OFFICE O/P EST MOD 30 MIN: CPT | Performed by: PHYSICIAN ASSISTANT

## 2017-02-16 PROCEDURE — H2035 A/D TX PROGRAM, PER HOUR: HCPCS | Mod: HQ

## 2017-02-16 PROCEDURE — 10020000 ZZH LODGING PLUS FACILITY CHARGE ADULT

## 2017-02-16 NOTE — MR AVS SNAPSHOT
After Visit Summary   2/16/2017    Rashawn Short    MRN: [id number]           Patient Information     Date Of Birth          1960        Visit Information        Provider Department      2/16/2017 10:30 AM Luisito Rivera PA-C [facility]        Today's Diagnoses     Uncomplicated alcohol dependence (H)    -  1    Routine general medical examination at a health care facility           Follow-ups after your visit        Your next 10 appointments already scheduled     Feb 17, 2017  7:15 AM CST   Treatment with ADULT LODGING PLUS C   [facility] ([facility])    [address] [place] [id number]   [phone]            Feb 18, 2017  7:15 AM CST   Treatment with ADULT LODGING PLUS C   [facility] ([facility])    [address] [place] [id number]   [phone]            Feb 19, 2017  7:15 AM CST   Treatment with ADULT LODGING PLUS C   [facility] ([facility])    [address] [place] [address]   [phone]            Feb 20, 2017  7:15 AM CST   Treatment with ADULT LODGING PLUS C   [facility] ([facility])    [address] [place] [address]   [phone]            Feb 21, 2017  7:15 AM CST   Treatment with ADULT LODGING PLUS C   [facility] ([facility])    [address] [place] [address]   [phone]            Feb 22, 2017  7:15 AM CST   Treatment with ADULT LODGING PLUS C   [facility] ([facility])    [address] [place] [phone]   [phone]            Feb 23, 2017  7:15 AM CST  "  Treatment with ADULT LODGING PLUS C   [facility] ([facility])    [address] [place] [address]   [phone]            Feb 24, 2017  7:15 AM CST   Treatment with ADULT LODGING PLUS C   [facility] ([facility])    [address] [place] [address]   [phone]            Feb 25, 2017  7:15 AM CST   Treatment with ADULT LODGING PLUS C   [facility] ([facility])    [address] [place] [address]   [phone]            Feb 26, 2017  7:15 AM CST   Treatment with ADULT LODGING PLUS C   [facility] ([facility])    [address] [place] [address]   [phone]              Who to contact     If you have questions or need follow up information about today's clinic visit or your schedule please contact [facility] directly at [phone].  Normal or non-critical lab and imaging results will be communicated to you by MyChart, letter or phone within 4 business days after the clinic has received the results. If you do not hear from us within 7 days, please contact the clinic through [facility] or phone. If you have a critical or abnormal lab result, we will notify you by phone as soon as possible.  Submit refill requests through [employer] or call your pharmacy and they will forward the refill request to us. Please allow 3 business days for your refill to be completed.          Additional Information About Your Visit        [facility] Information     [facility] lets you send messages to your doctor, view your test results, renew your prescriptions, schedule appointments and more. To sign up, go to [email or web address] . Click on \"Log in\" on the left side of the screen, which will " "take you to the Welcome page. Then click on \"Sign up Now\" on the right side of the page.     You will be asked to enter the access code listed below, as well as some personal information. Please follow the directions to create your username and password.     Your access code is: [email or web address]  Expires: 4/3/2017  1:57 PM     Your access code will  in 90 days. If you need help or a new code, please call your [place] clinic or [phone].        Care EveryWhere ID     This is your Care EveryWhere ID. This could be used by other organizations to access your [place] medical records  [id number]        Your Vitals Were     Pulse Temperature Respirations Pulse Oximetry BMI (Body Mass Index)       72 98.5  F (36.9  C) (Oral) 16 100% 27.06 kg/m2        Blood Pressure from Last 3 Encounters:   17 102/66   13 138/84   13 94/59    Weight from Last 3 Encounters:   17 194 lb (88 kg)   13 187 lb (84.8 kg)   13 182 lb (82.6 kg)              Today, you had the following     No orders found for display         Today's Medication Changes          These changes are accurate as of: 17 10:53 AM.  If you have any questions, ask your nurse or doctor.               These medicines have changed or have updated prescriptions.        Dose/Directions    fish oil-omega-3 fatty acids 1000 MG capsule   This may have changed:  how much to take   Used for:  HTN (hypertension)        Dose:  2 g   Take 2 capsules by mouth daily.   Quantity:  30 capsule   Refills:  0                Primary Care Provider Fax #    [facility] [phone] [address] [place] [address]        Thank you!     Thank you for choosing [facility]  for your care. Our goal is always to provide you with excellent care. Hearing back from our patients is one way we can continue to improve our services. Please take a few minutes to complete the written survey that you may " receive in the mail after your visit with us. Thank you!             Your Updated Medication List - Protect others around you: Learn how to safely use, store and throw away your medicines at www.disposemymeds.org.          This list is accurate as of: 2/16/17 10:53 AM.  Always use your most recent med list.                   Brand Name Dispense Instructions for use    alum & mag hydroxide-simethicone 200-200-20 MG/5ML Susp suspension    MYLANTA/MAALOX     Take 30 mLs by mouth every 6 hours as needed for indigestion       aspirin 325 MG tablet     30 tablet    Take 1 tablet by mouth daily.       cloNIDine 0.1 MG tablet    CATAPRES     Take 0.1 mg by mouth 2 times daily       fish oil-omega-3 fatty acids 1000 MG capsule     30 capsule    Take 2 capsules by mouth daily.       guaiFENesin 20 mg/mL Soln solution    ROBITUSSIN     Take 10 mLs by mouth every 4 hours as needed for cough       hydrochlorothiazide 25 MG tablet    HYDRODIURIL     Take 25 mg by mouth daily       lisinopril 20 MG tablet    PRINIVIL/ZESTRIL     Take 20 mg by mouth daily       loratadine 10 MG tablet    CLARITIN     Take 10 mg by mouth daily as needed for allergies       metoprolol 50 MG tablet    LOPRESSOR     Take 50 mg by mouth daily       MULTIVITAMIN ADULTS 50+ Tabs      Take 1 tablet by mouth       phenol-menthol 14.5 MG lozenge      Place 1 lozenge inside cheek every 2 hours as needed for moderate pain       senna-docusate 8.6-50 MG per tablet    SENOKOT-S;PERICOLACE     Take 1 tablet by mouth daily       thiamine 100 MG tablet     30 tablet    Take 1 tablet by mouth daily.       TYLENOL PO      Take 325-650 mg by mouth every 4 hours as needed for mild pain or fever

## 2017-02-16 NOTE — PROGRESS NOTES
Name: Rashawn Short  Date: 2/15/2017  Medical Record: [id number]    Envelope Number: [id number]    List of Contents (List each item separately in new row):   Carafate 1GM    Admission:  I am responsible for any personal items that are not sent to the safe or pharmacy.  [facility] is not responsible for loss, theft or damage of any property in my possession.      Patient Signature:  ___________________________________________       Date/Time:__________________________    Staff Signature: __________________________________       Date/Time:__________________________    2nd Staff person, if patient is unable/unwilling to sign:      __________________________________________________________       Date/Time: __________________________      Discharge:  [facility] has returned all of my personal belongings:    Patient Signature: ________________________________________     Date/Time: ____________________________________    Staff Signature: ______________________________________     Date/Time:_____________________________________

## 2017-02-16 NOTE — PROGRESS NOTES
CC: Patient is a 56 year old  male who presents for History and Physical for Lodging Plus.         Patient Active Problem List   Diagnosis     EtOH dependence (H)     Hypertension goal BP (blood pressure) < 140/90     Alcohol dependence (H)     Chemical dependency (H)       Past Medical History   Diagnosis Date     Depressive disorder      EtOH dependence (H)      Hypertension        Past Surgical History   Procedure Laterality Date     Orthopedic surgery  1998     ACL Right knee reconstruction-        Family History   Problem Relation Age of Onset     C.A.D. No family hx of      DIABETES No family hx of      Hypertension No family hx of      CEREBROVASCULAR DISEASE No family hx of      Breast Cancer No family hx of      Cancer - colorectal No family hx of      Prostate Cancer No family hx of        Social History     Social History     Marital status:      Spouse name: N/A     Number of children: N/A     Years of education: N/A     Occupational History     Not on file.     Social History Main Topics     Smoking status: Never Smoker     Smokeless tobacco: Never Used     Alcohol use Yes      Comment: etoh abuse     Drug use: No     Sexual activity: Yes     Partners: Female     Other Topics Concern     Not on file     Social History Narrative             ROS:  C: NEGATIVE for fever, chills, change in weight  I: NEGATIVE for worrisome rashes, moles or lesions  E: NEGATIVE for vision changes or irritation  ENT: NEGATIVE for ear, mouth and throat problems  R: NEGATIVE for significant cough or SOB  CV: NEGATIVE for chest pain, palpitations or peripheral edema  GI: NEGATIVE for nausea, abdominal pain, heartburn, or change in bowel habits  M: NEGATIVE for significant arthralgias or myalgia  N: NEGATIVE for weakness, dizziness or paresthesias  P: NEGATIVE for changes in mood or affect  : negative for dysuria, hematuria, urethral discharge      OBJECTIVE:  B/P: Data Unavailable, T: Data Unavailable, P: Data  "Unavailable, R: Data Unavailable    Exam:  Constitutional: healthy, alert and no distress  Head: Normocephalic. No masses, lesions, tenderness or abnormalities  Neck: Neck supple. No adenopathy. Thyroid symmetric, normal size,  ENT: ENT exam normal, no neck nodes or sinus tenderness  Cardiovascular: negative\",  RRR. No murmurs, clicks gallops or rub  Respiratory: negative\", Lungs clear  Gastrointestinal: Abdomen soft, non-tender. BS normal. No masses, organomegaly  Musculoskeletal: extremities normal- no gross deformities noted, gait normal and normal muscle tone  Skin: no suspicious lesions or rashes  Neurologic: Gait normal. Reflexes normal and symmetric. Sensation grossly WNL.  Psychiatric: mentation appears normal and affect normal/bright  Hematologic/Lymphatic/Immunologic: normal ant/post cervical and supraclavicular  nodes      ASSESSMENT/PLAN:  1. Uncomplicated alcohol dependence (H)    2. Routine general medical examination at a health care facility    Continue care per Lodging Plus Guidelines  Patient amenable to this follow up plan.       Luisito Rivera PA-C    "

## 2017-02-16 NOTE — PROGRESS NOTES
2/16/2017   D: Pt completed an assignment on guilt and shame and presented today during group therapy.  I: Counselor facilitated group therapy and pt's peers provided feedback.  A: Pt was thorough on his assignment and appeared anxious while presenting.   P: Pt will continue to work on goals/assignments on treatment plan.    SAYDA Mcneil

## 2017-02-17 ENCOUNTER — HOSPITAL ENCOUNTER (OUTPATIENT)
Dept: BEHAVIORAL HEALTH | Facility: CLINIC | Age: 57
End: 2017-02-17
Attending: PSYCHIATRY & NEUROLOGY
Payer: COMMERCIAL

## 2017-02-17 PROCEDURE — H2035 A/D TX PROGRAM, PER HOUR: HCPCS | Mod: HQ

## 2017-02-17 PROCEDURE — 10020000 ZZH LODGING PLUS FACILITY CHARGE ADULT

## 2017-02-18 ENCOUNTER — HOSPITAL ENCOUNTER (OUTPATIENT)
Dept: BEHAVIORAL HEALTH | Facility: CLINIC | Age: 57
End: 2017-02-18
Attending: PSYCHIATRY & NEUROLOGY
Payer: COMMERCIAL

## 2017-02-18 PROCEDURE — H2035 A/D TX PROGRAM, PER HOUR: HCPCS | Mod: HQ

## 2017-02-18 PROCEDURE — 10020000 ZZH LODGING PLUS FACILITY CHARGE ADULT: Mod: GA

## 2017-02-19 ENCOUNTER — HOSPITAL ENCOUNTER (OUTPATIENT)
Dept: BEHAVIORAL HEALTH | Facility: CLINIC | Age: 57
End: 2017-02-19
Attending: PSYCHIATRY & NEUROLOGY
Payer: COMMERCIAL

## 2017-02-19 PROCEDURE — H2035 A/D TX PROGRAM, PER HOUR: HCPCS | Mod: HQ

## 2017-02-19 PROCEDURE — 10020000 ZZH LODGING PLUS FACILITY CHARGE ADULT

## 2017-02-20 ENCOUNTER — HOSPITAL ENCOUNTER (OUTPATIENT)
Dept: BEHAVIORAL HEALTH | Facility: CLINIC | Age: 57
End: 2017-02-20
Attending: PSYCHIATRY & NEUROLOGY
Payer: COMMERCIAL

## 2017-02-20 PROCEDURE — 10020000 ZZH LODGING PLUS FACILITY CHARGE ADULT

## 2017-02-20 PROCEDURE — H2035 A/D TX PROGRAM, PER HOUR: HCPCS | Mod: HQ

## 2017-02-20 NOTE — PROGRESS NOTES
"Patient:  Rashawn Short            Adult CD Progress Note and Treatment Plan Review     Attendance  Please refer to OP BEH CD Adult Attendance Record Documentation Flowsheet    Support group attended this week: yes    Reporting sobriety:  yes    Treatment Plan     Treatment Plan Review competed on: 2/20/2017        Client preferred learning style: Hands on    Staff Members contributing: SAYDA Mcneil; YISEL Peterson-T                     Received Supervision: Yes    Client: contributed to goals and plan.    Client received copy of plan/revised plan: Yes    Client agrees with plan/revised plan: Yes    Changes to Treatment Plan: Yes    New Goals added since last review: All goals are new since this is the first review    Goals worked on since last review: Self-esteem, relationships, relapse prevention    Strategies effective: yes    Strategies need these changes: None    ASAM Risk Rating:    Dimension 1, 0: Pt reports his last date of use was 02/11/17. Pt denies any withdrawal symptoms.    Dimension 2, 1: Pt reports having high blood pressure and that he is currently taking medications for it. Pt denies any other biomedical concerns at this time. Pt appears able to monitor his physical health independently and seek medical services as needed.     Dimension 3, 1: Pt completed his assignment \"The Book of Me\" and presented during group therapy on 02/15/17. Pt learned about positive self-affirmations and practiced giving and receiving compliments. Pt also completed an assignment on guilt and shame and presented during group therapy on 02/16/17. Pt participated in a suicide risk screening upon intake and was rated at no risk. Pt denies any current suicidal/homicidal ideation.     Dimension 4, 0: Pt has a positive attitude in group therapy and reports he is highly motivated for treatment. Pt attended spirituality group this week.     Dimension 5, 4: Pt participated in a relapse prevention workshop on 02/18/17. " Pt learned about triggers, warning signs, and coping skills.     Dimension 6, 2: Pt reports he is planning to return home to live with his wife after treatment. Pt attended 3 AA meetings this week and received a temporary sponsor.     Any changes in Vulnerable Adult Status?  No  If yes, add to treatment plan and individual abuse prevention plan.    Family Involvement:   PALOMO signed    Data:   offered feedback   good insight client   did actively participate    Intervention:   Counselor feedback  Education  Emotional management  Group feedback  Relapse prevention  Twelve Step facilitation  Client & counselor reviewed and signed ISP & assessment summary  Mental health education    Assessment:   Stages of Change Model  Preparation/Determination    Appears/Sounds:  Cooperative  Motivated  Engaged    Plan:  Focus on recovery environment  Monitor emotional/physical health  Continue to work on goals/assignments on treatment plan    SAYDA Mcneil

## 2017-02-21 ENCOUNTER — HOSPITAL ENCOUNTER (OUTPATIENT)
Dept: BEHAVIORAL HEALTH | Facility: CLINIC | Age: 57
End: 2017-02-21
Attending: PSYCHIATRY & NEUROLOGY
Payer: COMMERCIAL

## 2017-02-21 PROCEDURE — 10020000 ZZH LODGING PLUS FACILITY CHARGE ADULT

## 2017-02-21 PROCEDURE — H2035 A/D TX PROGRAM, PER HOUR: HCPCS | Mod: HQ

## 2017-02-21 NOTE — PROGRESS NOTES
"D-Patient completed hi \"First Step\" assignment and presented in group. Patient discussed his tolerance levels, withdrawal symptoms and how he tried to conceal his use from family and friends. Patient discussed how his relationships were damaged due to his use at work and home and how isolation led him to deeper and darker places.   I-Group therapy, Counselor reviewed group norms, facilitated group discussion and conducted feedback session.  A-Patient appears to have a much better understanding of the steps that caused his addiction and behavior that will restore his health and relationships.  P-Continue with treatment and treatment plan objectives.  "

## 2017-02-22 ENCOUNTER — HOSPITAL ENCOUNTER (OUTPATIENT)
Dept: BEHAVIORAL HEALTH | Facility: CLINIC | Age: 57
End: 2017-02-22
Attending: PSYCHIATRY & NEUROLOGY
Payer: COMMERCIAL

## 2017-02-22 PROCEDURE — H2035 A/D TX PROGRAM, PER HOUR: HCPCS | Mod: HQ

## 2017-02-22 PROCEDURE — 10020000 ZZH LODGING PLUS FACILITY CHARGE ADULT: Mod: GA

## 2017-02-23 ENCOUNTER — HOSPITAL ENCOUNTER (OUTPATIENT)
Dept: BEHAVIORAL HEALTH | Facility: CLINIC | Age: 57
End: 2017-02-23
Attending: PSYCHIATRY & NEUROLOGY
Payer: COMMERCIAL

## 2017-02-23 ENCOUNTER — HOSPITAL ENCOUNTER (OUTPATIENT)
Facility: CLINIC | Age: 57
End: 2017-02-23
Attending: COLON & RECTAL SURGERY | Admitting: COLON & RECTAL SURGERY
Payer: COMMERCIAL

## 2017-02-23 PROCEDURE — 10020000 ZZH LODGING PLUS FACILITY CHARGE ADULT: Mod: GA

## 2017-02-23 PROCEDURE — 99214 OFFICE O/P EST MOD 30 MIN: CPT | Performed by: PSYCHIATRY & NEUROLOGY

## 2017-02-23 PROCEDURE — H2035 A/D TX PROGRAM, PER HOUR: HCPCS | Mod: HQ

## 2017-02-24 ENCOUNTER — HOSPITAL ENCOUNTER (OUTPATIENT)
Dept: BEHAVIORAL HEALTH | Facility: CLINIC | Age: 57
End: 2017-02-24
Attending: PSYCHIATRY & NEUROLOGY
Payer: COMMERCIAL

## 2017-02-24 PROCEDURE — H2035 A/D TX PROGRAM, PER HOUR: HCPCS | Mod: HQ

## 2017-02-24 PROCEDURE — 10020000 ZZH LODGING PLUS FACILITY CHARGE ADULT

## 2017-02-24 NOTE — PROGRESS NOTES
Acknowledgement of Current Treatment Plan       I have reviewed my revised treatment plan with my therapist / counselor on 2/24/17. I agree with the plan as it is written in the electronic health record.    Name Signature   Rashawn Short    Name of Therapist / Counselor    EMANI Arzate [facility]

## 2017-02-25 ENCOUNTER — HOSPITAL ENCOUNTER (OUTPATIENT)
Dept: BEHAVIORAL HEALTH | Facility: CLINIC | Age: 57
End: 2017-02-25
Attending: PSYCHIATRY & NEUROLOGY
Payer: COMMERCIAL

## 2017-02-25 PROCEDURE — 10020000 ZZH LODGING PLUS FACILITY CHARGE ADULT

## 2017-02-25 PROCEDURE — H2035 A/D TX PROGRAM, PER HOUR: HCPCS | Mod: HQ

## 2017-02-26 ENCOUNTER — HOSPITAL ENCOUNTER (OUTPATIENT)
Dept: BEHAVIORAL HEALTH | Facility: CLINIC | Age: 57
End: 2017-02-26
Attending: PSYCHIATRY & NEUROLOGY
Payer: COMMERCIAL

## 2017-02-26 PROCEDURE — 10020000 ZZH LODGING PLUS FACILITY CHARGE ADULT

## 2017-02-26 PROCEDURE — H2035 A/D TX PROGRAM, PER HOUR: HCPCS | Mod: HQ

## 2017-02-27 ENCOUNTER — HOSPITAL ENCOUNTER (OUTPATIENT)
Dept: BEHAVIORAL HEALTH | Facility: CLINIC | Age: 57
End: 2017-02-27
Attending: PSYCHIATRY & NEUROLOGY
Payer: COMMERCIAL

## 2017-02-27 PROCEDURE — 10020000 ZZH LODGING PLUS FACILITY CHARGE ADULT

## 2017-02-27 PROCEDURE — H2035 A/D TX PROGRAM, PER HOUR: HCPCS | Mod: HQ

## 2017-02-27 NOTE — PROGRESS NOTES
Patient:  Rashawn Short            Adult CD Progress Note and Treatment Plan Review     Attendance  Please refer to OP BEH CD Adult Attendance Record Documentation Flowsheet    Support group attended this week: yes    Reporting sobriety:  yes    Treatment Plan     Treatment Plan Review competed on: 2/27/17       Client preferred learning style: Visual  Hands on    Staff Members contributing: Tracey Abad [facility];   Kamryn Alcantara [facility]; Lauren Gu [facility]     Received Supervision: Yes    Client: contributed to goals and plan.    Client received copy of plan/revised plan: Yes    Client agrees with plan/revised plan: Yes        Changes to Treatment Plan: Yes :  A new goal was added as well as additional assignments. Tx plan was revised and pt signed new tx plan.     New Goals added since last review :  To gain insight into Co-dependency tendencies and to begin to work on these tendencies.     Goals worked on since last review:  Relapse Prevention, self esteem, spirituality, healthy relationships, gaining insight into his addiction/increase motivation for recovery, Developing knowledge and awareness of the first step within the 12 steps of Alcoholics Anonymous, admitting powerlessness over his addiction.     Strategies effective: yes    Strategies need these changes: None     ASAM Risk Rating:    Dimension 1 : 0: Pt denies any symptoms of withdrawal. He reports his last date of use of alcohol on 2/11/17.    Dimension 2 : 1 Pt denies any new medical concerns or conditions and is able to access medical services as needed.     Dimension 3 : 1 Pt was seen by staff psychiatrist, Dr. Hamlin, and per her note has a diagnosis of  R/o separtion anxiety dx. Pt appears to struggle with issues of Co-dependency and was given assignments in order to gain insight and begin to address. Pt will also be meeting with counselor to help identify areas of people pleasing and beginning to build assertiveness skills in order to ask for  "what he needs. Pt was given information in order to begin to practie these skills. Pt shares no changes in his mood and shares feeling good, focused and making progress.  Pt participated in a suicide risk screening upon intake and was rated at no risk. Pt denies any current suicidal/homicidal ideation.     Dimension 4 : 0: Pt verbalizes his strong motivation for recovery, sharing he will loose his wife if he doesn't remain sober. Pt completed his goal of beginning to knowledge and develop awareness of the first step within the 12 steps of Alcoholics Anonymous. Pt verbalized his powerlessness over his addiction to alcohol and stated what his addiction has cost him. Pt completed an in group assignment of creating a collage as a visual tool to help increase his motivation for ongoing recovery  Pt has been an active participant within group therapy/lectures and programming. Pt appears to be in the determination stages of change within the stages of change model. Pt reveals his motivation for recovery this week is \"a fulfilled and happy family in the future.\" Pt also attended spirituality group this past week.     Dimension 5 : 4: Pt continues to work on relapse prevention skills. He attended a weekend workshop, gaining insight into healthy relationships. He denies any cravings to use this past week.     Dimension 6 : 2 Pt is beginning to build sober support and has attended at least 2 support meetings per week (AA) and has spent free time with male peers. Pt is considering sober housing once he has completed the LP program. Referrals are being considered and made at this time.   Any changes in Vulnerable Adult Status?  No  If yes, add to treatment plan and individual abuse prevention plan.    Family Involvement:   none schedule this week    Data:   offered feedback good insight client did actively participate      Intervention:   Aftercare planning  Cognitive Behavioral Therapy  Counselor feedback  Education  Emotional " management  Group feedback  Motivational Enhancement Therapy  Relapse prevention  Twelve Step facilitation  Client & counselor reviewed and signed ISP & assessment summary  Mental health education      Assessment:   Stage of Change:  Determination     Appears/Sounds:  Cooperative  Motivated  Anxious      Plan:  Focus on recovery environment  Monitor emotional/physical health  Continue to address goals on treatment plan and complete assignments.     SAYDA Arzate

## 2017-02-28 ENCOUNTER — HOSPITAL ENCOUNTER (OUTPATIENT)
Dept: BEHAVIORAL HEALTH | Facility: CLINIC | Age: 57
End: 2017-02-28
Attending: PSYCHIATRY & NEUROLOGY
Payer: COMMERCIAL

## 2017-02-28 PROCEDURE — H2035 A/D TX PROGRAM, PER HOUR: HCPCS | Mod: HQ

## 2017-02-28 PROCEDURE — 10020000 ZZH LODGING PLUS FACILITY CHARGE ADULT: Mod: GA

## 2017-02-28 RX ORDER — ONDANSETRON 2 MG/ML
4 INJECTION INTRAMUSCULAR; INTRAVENOUS
Status: CANCELLED | OUTPATIENT
Start: 2017-02-28

## 2017-02-28 RX ORDER — LIDOCAINE 40 MG/G
CREAM TOPICAL
Status: CANCELLED | OUTPATIENT
Start: 2017-02-28

## 2017-02-28 NOTE — PROGRESS NOTES
AFTERCARE NOTE   2/28/17    (2/27/17) Writer met with patient to discuss his aftercare plans. Options were discussed with [facility] being the pt's choice. Writer printed some [facility] info off their web site and gave to pt to read and we'll meet again on 2/28/17.     (2/28/17) Writer met again with patient who had read the info and spoken to his wife re: Bryce Abebe and he would like to pursue [facility] as part of his aftercare plan. Writer called [facility] Intake and asked Hannah's machine to put pt on intake list, along with d/c date of 3/13/17, his insurance and d.o.c. At this time, writer has yet to hear   back from [facility]    Ritesh Khalil [facility]

## 2017-02-28 NOTE — PROGRESS NOTES
"D-Patient completed his \"Alcohol and Drug Progression\" assignment and presented in group. Patient discussed his 25 year relationship with alcohol and the negative affects it had on various parts of his life including family, friends and work. Patient discussed the values he violated and well as the consequences of his addiction.  I-Group therapy-Counselor reviewed group norms, facilitated group discussion and feedback from peers.  A-Patient was very motivated and appears to be more aware of his use patterns and the consequences if he continues to abuse substances.  P-Continue with treatment and treatment plan objectives.  "

## 2017-03-01 ENCOUNTER — TELEPHONE (OUTPATIENT)
Dept: BEHAVIORAL HEALTH | Facility: CLINIC | Age: 57
End: 2017-03-01

## 2017-03-01 ENCOUNTER — HOSPITAL ENCOUNTER (OUTPATIENT)
Dept: BEHAVIORAL HEALTH | Facility: CLINIC | Age: 57
End: 2017-03-01
Attending: PSYCHIATRY & NEUROLOGY
Payer: COMMERCIAL

## 2017-03-01 ENCOUNTER — TRANSFERRED RECORDS (OUTPATIENT)
Dept: HEALTH INFORMATION MANAGEMENT | Facility: CLINIC | Age: 57
End: 2017-03-01

## 2017-03-01 PROCEDURE — 10020000 ZZH LODGING PLUS FACILITY CHARGE ADULT: Mod: GA

## 2017-03-01 PROCEDURE — H2035 A/D TX PROGRAM, PER HOUR: HCPCS | Mod: HQ

## 2017-03-01 NOTE — TELEPHONE ENCOUNTER
A phone call was made as follow-up to the Family Program mailing for the week of 3/6/17. Message left or spoke in person to individuals on PALOMO.

## 2017-03-02 ENCOUNTER — HOSPITAL ENCOUNTER (OUTPATIENT)
Dept: BEHAVIORAL HEALTH | Facility: CLINIC | Age: 57
End: 2017-03-02
Attending: PSYCHIATRY & NEUROLOGY
Payer: COMMERCIAL

## 2017-03-02 PROCEDURE — H2035 A/D TX PROGRAM, PER HOUR: HCPCS | Mod: HQ

## 2017-03-02 PROCEDURE — 10020000 ZZH LODGING PLUS FACILITY CHARGE ADULT: Mod: GA

## 2017-03-02 NOTE — PROGRESS NOTES
Faxed paperwork of Rule 25, PALOMO, Medication list and progress notes to [facility] for  to secure a spot in programming once the pt completes the LP program on 3/13/17.

## 2017-03-02 NOTE — PROGRESS NOTES
3/2/2017   D: Pt completed a reading on stress and recovery and shared his thoughts during group therapy.  I: Counselor facilitated group therapy and pt's peers participated in a conversation about stress.  A: Pt reported he really liked the reading, and he highlighted several things from it to share. One piece he noted as being very important was about the stress that comes when he leaves treatment and re-enters into society.   P: Pt will continue to work on goals/assignments on treatment plan.    SAYDA Mcneil

## 2017-03-03 ENCOUNTER — HOSPITAL ENCOUNTER (OUTPATIENT)
Dept: BEHAVIORAL HEALTH | Facility: CLINIC | Age: 57
End: 2017-03-03
Attending: PSYCHIATRY & NEUROLOGY
Payer: COMMERCIAL

## 2017-03-03 PROCEDURE — H2035 A/D TX PROGRAM, PER HOUR: HCPCS | Mod: HQ

## 2017-03-03 PROCEDURE — 10020000 ZZH LODGING PLUS FACILITY CHARGE ADULT

## 2017-03-04 ENCOUNTER — HOSPITAL ENCOUNTER (OUTPATIENT)
Dept: BEHAVIORAL HEALTH | Facility: CLINIC | Age: 57
End: 2017-03-04
Attending: PSYCHIATRY & NEUROLOGY
Payer: COMMERCIAL

## 2017-03-04 PROCEDURE — 10020000 ZZH LODGING PLUS FACILITY CHARGE ADULT: Mod: GA

## 2017-03-04 PROCEDURE — H2035 A/D TX PROGRAM, PER HOUR: HCPCS | Mod: HQ

## 2017-03-05 ENCOUNTER — HOSPITAL ENCOUNTER (OUTPATIENT)
Dept: BEHAVIORAL HEALTH | Facility: CLINIC | Age: 57
End: 2017-03-05
Attending: PSYCHIATRY & NEUROLOGY
Payer: COMMERCIAL

## 2017-03-05 PROCEDURE — H2035 A/D TX PROGRAM, PER HOUR: HCPCS | Mod: HQ

## 2017-03-05 PROCEDURE — 10020000 ZZH LODGING PLUS FACILITY CHARGE ADULT

## 2017-03-06 ENCOUNTER — HOSPITAL ENCOUNTER (OUTPATIENT)
Dept: BEHAVIORAL HEALTH | Facility: CLINIC | Age: 57
End: 2017-03-06
Attending: PSYCHIATRY & NEUROLOGY
Payer: COMMERCIAL

## 2017-03-06 PROCEDURE — H2035 A/D TX PROGRAM, PER HOUR: HCPCS

## 2017-03-06 PROCEDURE — 10020000 ZZH LODGING PLUS FACILITY CHARGE ADULT: Mod: GA

## 2017-03-06 PROCEDURE — H2035 A/D TX PROGRAM, PER HOUR: HCPCS | Mod: HQ

## 2017-03-06 NOTE — PROGRESS NOTES
"D). Pt met one to one with this writer to help identify areas of people pleasing, co dependant tendencies and to begin to build assertiveness skills through using \"I\" statements in order to ask for what he needs. Pt shared examples of feeling left out, frustrated when his wife won't communicate with him, discouraged, lonely and embarrassed. He then shared \"I would prefer\" statements as a way to practice asking for what he needs.     I). Conducted one to one session with patient     A). Pt shares recognition of his co-dependency as he often will base his happiness and how his day will go if his wife is happy with him or not.     P).  Pt is encouraged to continue to work on areas of co-dependency and seek marriage/family counseling in order to learn additional coping strategies. Pt also to continue to working on treatment plan co-dependency goals.   "

## 2017-03-06 NOTE — PROGRESS NOTES
"Patient:  Rashawn Short            Adult CD Progress Note and Treatment Plan Review     Attendance  Please refer to OP BEH CD Adult Attendance Record Documentation Flowsheet    Support group attended this week: yes    Reporting sobriety:  yes    Treatment Plan     Treatment Plan Review competed on: 03/06/17       Client preferred learning style: Visual  Hands on    Staff Members contributing :   Tracey Abad [facility];  Kamryn Alcantara [facility]; YISEL Peterson-T   Received Supervision: Yes    Client: contributed to goals and plan.    Client received copy of plan/revised plan: Yes    Client agrees with plan/revised plan: Yes        Changes to Treatment Plan: No    New Goals added since last review : None     Goals worked on since last review : Relapse Prevention, self esteem, spirituality,  gaining insight into his addiction/increase motivation for recovery, aftercare planning, Stress management, codependency, assertiveness skills. .     Strategies effective: yes    Strategies need these changes: None     ASAM Risk Rating:    Dimension 1 : 0: Pt denies any symptoms of withdrawal. He reports his last date of use of alcohol on 2/11/17.    Dimension 2 : 1: PT denies any medical concerns or conditions and is able to access medical services as needed.     Dimension 3 : 1: Pt was seen by staff psychiatrist, Dr. Hamlin, and per her note has a diagnosis of R/o separtion anxiety. Pt met one to one with this writer to help identify areas of people pleasing, co dependant tendencies and to begin to build assertiveness skills through using \"I\" statements in order to ask for what he needs. Pt shared examples of feeling left out, frustrated when his wife won't communicate with him, discouraged, lonely and embarrassed. He then shared \"I would prefer\"  statements as a way to practice asking for what he needs. Pt shares recognition of his co-dependency as he often will base his happiness and how his day will go if his wife is happy " "with him or not. Pt is encouraged to continue to work on areas of co-dependency and seek marriage/family counseling in order to learn additional coping strategies. Pt shares no changes in his mood and shares feeling good, focused and making progress. Pt participated in a suicide risk screening upon intake and was rated at no risk. Pt denies any current suicidal/homicidal ideation.     Dimension 4: 0: Pt continues to verbalize his strong motivation for recovery. Pt completed his treatment planned assignment \"Alcohol and drug progression\" in order to gain insight into his addiction. Pt seems to be in the Determination stages of change as evidence by his participation in group therapy, meeting with his sponsor, completing treatment plan goals and assignments. Pt shares his motivation for treatment this week is \"repair trust with family and to gave a super productive giving life.\" Pt shares things that are going well for him this week in treatment are \"self confidence, surrender, great meetings\" and the time spent from a visit from his wife and sponsor. Pt also attended spirituality group this past week.     Dimension 5 : 4: Pt attended the Relapse Prevention workshop gaining insight into his triggers and coping skills. Pt shares craving a \"1\" on a scale of 1(low)-10(high) this past week.     Dimension 6 : 2: Pt is continuing to build sober support and has attended at least 2 support meetings per week (AA) and has spent free time with male peers. He also reports meeting with his sponsor over the weekend and has been in contact with him. Pt did not attend family programming due to his wife not being able to attend due to her new job.   Pt is interested in [facility] program and housing. He has an interview with them on 3/7/17.  Referrals are in process at this time.     Any changes in Vulnerable Adult Status?  No  If yes, add to treatment plan and individual abuse prevention plan.    Family Involvement:   Family was " not able to attend     Data:   offered feedback good insight client did actively participate      Intervention:   Aftercare planning  Cognitive Behavioral Therapy  Counselor feedback  Education  Emotional management  Group feedback  Motivational Enhancement Therapy  Relapse prevention  Twelve Step facilitation  Mental health education      Assessment:   Stages of change model: Determination     Appears/Sounds:  Cooperative  Motivated  Engaged      Plan:  Focus on recovery environment  Monitor emotional/physical health  Continue to complete treatment plan goals and assignments    Tracey Abad [facility]

## 2017-03-06 NOTE — PROGRESS NOTES
"D-Patient completed his \"Relapse, Triggers and Cues\" assignment and presented in group. Patient discussed the people, places, and things that would mostly likely cause him to use. Patient discussed what a relapse would cost him in the form of family, particularly the relationship with his wife and children. Patient also discussed changing his beliefs regarding the value he thought these triggers held, and how those were mistaken beliefs. Patient discussed strategies for avoiding his relapse triggers and cues.  I-Group therapy-Counselor reviewed group norms, facilitated group discussion and conducted feedback session with group peers.  A-Patient appears to have a much firmer grasp on what triggers put him at the greatest risk for relapse and how to avoid those triggers.  P-Continue with treatment plan and treatment plan objectives..  "

## 2017-03-07 ENCOUNTER — HOSPITAL ENCOUNTER (OUTPATIENT)
Dept: BEHAVIORAL HEALTH | Facility: CLINIC | Age: 57
End: 2017-03-07
Attending: PSYCHIATRY & NEUROLOGY
Payer: COMMERCIAL

## 2017-03-07 PROCEDURE — 10020000 ZZH LODGING PLUS FACILITY CHARGE ADULT: Mod: GA

## 2017-03-07 PROCEDURE — H2035 A/D TX PROGRAM, PER HOUR: HCPCS | Mod: HQ

## 2017-03-08 ENCOUNTER — HOSPITAL ENCOUNTER (OUTPATIENT)
Dept: BEHAVIORAL HEALTH | Facility: CLINIC | Age: 57
End: 2017-03-08
Attending: PSYCHIATRY & NEUROLOGY
Payer: COMMERCIAL

## 2017-03-08 PROCEDURE — H2035 A/D TX PROGRAM, PER HOUR: HCPCS | Mod: HQ

## 2017-03-08 PROCEDURE — 10020000 ZZH LODGING PLUS FACILITY CHARGE ADULT: Mod: GA

## 2017-03-08 NOTE — PROGRESS NOTES
"D-Patient completed his \"Meeting My Needs\" assignment and presented in group. Patient discussed what he felt where the benefits to using substances and well as the drawbacks of addiction. Patient discussed how it affected his relationships and his emotional and mental states which where all negative. Patient also discussed the benefits of abstinence and why this would be a more healthy and compelling way to life his life.  I-Group therapy-Counselor reviewed group norms, facilitated group discussion and feedback session.  A-Patient appears to have a much firmer grasp on the contrasting life styles and results from a life of abuse and addiction vs one of abstinence and sobriety.  P-Continue with treatment and treatment plan objectives.  "

## 2017-03-09 ENCOUNTER — HOSPITAL ENCOUNTER (OUTPATIENT)
Dept: BEHAVIORAL HEALTH | Facility: CLINIC | Age: 57
End: 2017-03-09
Attending: PSYCHIATRY & NEUROLOGY
Payer: COMMERCIAL

## 2017-03-09 PROCEDURE — 10020000 ZZH LODGING PLUS FACILITY CHARGE ADULT

## 2017-03-09 PROCEDURE — H2035 A/D TX PROGRAM, PER HOUR: HCPCS | Mod: HQ

## 2017-03-09 PROCEDURE — 99213 OFFICE O/P EST LOW 20 MIN: CPT | Performed by: PSYCHIATRY & NEUROLOGY

## 2017-03-09 NOTE — PROGRESS NOTES
Interim history   This is a 56 year old male with ALCOHOL DEPENDENCE.Pt seen . Patient's mood is good  No anxiety, improved focus  Less preoccupation about finances   .  Energy Level:HIGH  Sleep:No concerns, sleeps well through night  Appetite:normal motivation interest are good  Denied any Suicidal/homicidal ideation/plan intent. Denied psychosis    Pt is afraid of rejected , people pleaser    Tolerating meds and has no side effects.         No prior suicide attempts      Past Medical History   Diagnosis Date     Depressive disorder      EtOH dependence (H)      Hypertension        10 point ROS is negative   constitutional    HEENT - no symptoms   RESPIRATORY-no symptoms   CVS-no symptoms   GI-no symptoms  MUSCKULOSKELETAL -no symptoms  NEUROLOGICAL-no symptoms  ENDOCRINE-no symptoms  HEMATAOLOGY-no symptoms  SKIN-no symptoms  Family History   Problem Relation Age of Onset     C.A.D. No family hx of      DIABETES No family hx of      Hypertension No family hx of      CEREBROVASCULAR DISEASE No family hx of      Breast Cancer No family hx of      Cancer - colorectal No family hx of      Prostate Cancer No family hx of      Social History     Social History     Marital status:      Spouse name: N/A     Number of children: N/A     Years of education: N/A     Occupational History     Not on file.     Social History Main Topics     Smoking status: Never Smoker     Smokeless tobacco: Never Used     Alcohol use Yes      Comment: etoh abuse     Drug use: No     Sexual activity: Yes     Partners: Female     Other Topics Concern     Not on file     Social History Narrative   FAMILY HISTORY: Negative for any substance use problems or mental illness.       SOCIAL HISTORY: He grew up in [place] with 3 siblings and an intact family. His father is a retired ophthalmologist. 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 He is  and has 3 children living in [place].              Medications:     Current Outpatient Prescriptions   Medication Sig     escitalopram (LEXAPRO) 10 MG tablet Take 1 tablet (10 mg) by mouth daily     Acetaminophen (TYLENOL PO) Take 325-650 mg by mouth every 4 hours as needed for mild pain or fever     alum & mag hydroxide-simethicone (MYLANTA/MAALOX) 200-200-20 MG/5ML SUSP suspension Take 30 mLs by mouth every 6 hours as needed for indigestion     guaiFENesin (ROBITUSSIN) 20 mg/mL SOLN solution Take 10 mLs by mouth every 4 hours as needed for cough     phenol-menthol (CEPASTAT) 14.5 MG lozenge Place 1 lozenge inside cheek every 2 hours as needed for moderate pain     loratadine (CLARITIN) 10 MG tablet Take 10 mg by mouth daily as needed for allergies     senna-docusate (SENOKOT-S;PERICOLACE) 8.6-50 MG per tablet Take 1 tablet by mouth daily     cloNIDine (CATAPRES) 0.1 MG tablet Take 0.1 mg by mouth 2 times daily     lisinopril (PRINIVIL/ZESTRIL) 20 MG tablet Take 20 mg by mouth daily     hydrochlorothiazide (HYDRODIURIL) 25 MG tablet Take 25 mg by mouth daily     metoprolol (LOPRESSOR) 50 MG tablet Take 50 mg by mouth daily      Multiple Vitamins-Minerals (MULTIVITAMIN ADULTS 50+) TABS Take 1 tablet by mouth     aspirin 325 MG tablet Take 1 tablet by mouth daily.     thiamine 100 MG tablet Take 1 tablet by mouth daily.     fish oil-omega-3 fatty acids 1000 MG capsule Take 2 capsules by mouth daily. (Patient taking differently: Take 1 g by mouth daily )     No current facility-administered medications for this encounter.      Facility-Administered Medications Ordered in Other Encounters   Medication     Self Administer Medications: Behavioral Services          Current Outpatient Prescriptions on File Prior to Encounter:  escitalopram (LEXAPRO) 10 MG tablet Take 1 tablet (10 mg) by mouth  daily   Acetaminophen (TYLENOL PO) Take 325-650 mg by mouth every 4 hours as needed for mild pain or fever   alum & mag hydroxide-simethicone (MYLANTA/MAALOX) 200-200-20 MG/5ML SUSP suspension Take 30 mLs by mouth every 6 hours as needed for indigestion   guaiFENesin (ROBITUSSIN) 20 mg/mL SOLN solution Take 10 mLs by mouth every 4 hours as needed for cough   phenol-menthol (CEPASTAT) 14.5 MG lozenge Place 1 lozenge inside cheek every 2 hours as needed for moderate pain   loratadine (CLARITIN) 10 MG tablet Take 10 mg by mouth daily as needed for allergies   senna-docusate (SENOKOT-S;PERICOLACE) 8.6-50 MG per tablet Take 1 tablet by mouth daily   cloNIDine (CATAPRES) 0.1 MG tablet Take 0.1 mg by mouth 2 times daily   lisinopril (PRINIVIL/ZESTRIL) 20 MG tablet Take 20 mg by mouth daily   hydrochlorothiazide (HYDRODIURIL) 25 MG tablet Take 25 mg by mouth daily   metoprolol (LOPRESSOR) 50 MG tablet Take 50 mg by mouth daily    Multiple Vitamins-Minerals (MULTIVITAMIN ADULTS 50+) TABS Take 1 tablet by mouth   aspirin 325 MG tablet Take 1 tablet by mouth daily.   thiamine 100 MG tablet Take 1 tablet by mouth daily.   fish oil-omega-3 fatty acids 1000 MG capsule Take 2 capsules by mouth daily. (Patient taking differently: Take 1 g by mouth daily )     Current Facility-Administered Medications on File Prior to Encounter:  Self Administer Medications: Behavioral Services          Allergies:   No Known Allergies         Psychiatric Examination:     Weight is 0 lbs 0 oz  There is no height or weight on file to calculate BMI.    Appearance:  awake, alert and adequately groomed  Attitude:  cooperative  Eye Contact:  good  Mood:  good  Affect:  appropriate and in normal range and mood congruent  Speech:  clear, coherent rate /rhythm are good  Psychomotor Behavior:  no evidence of tardive dyskinesia, dystonia, or tics and intact station, gait and muscle tone  Throught Process:  logical  Associations:  no loose associations  Thought  Content:  no evidence of suicidal ideation or homicidal ideation, no evidence of psychotic thought and no visual hallucinations present  Insight:  good  Judgement:  intact  Oriented to:  time, person, and place  Attention Span and Concentration:  intact  Recent and Remote Memory:  intact  Language fund of knowledge are adequate               Labs:     Lab Results   Component Value Date    NTBNPI 1930 (H) 01/27/2010     Lab Results   Component Value Date    WBC 7.5 01/03/2017    HGB 15.5 01/03/2017    HCT 45.6 01/03/2017    MCV 96 01/03/2017     01/03/2017     Lab Results   Component Value Date    TSH 1.17 04/25/2013           DIagnoses:   Adjustment dx   Alcohol dependence  R/o separtion anxiety dx       Plan:       Continue present meds  F/u as per counselours    Able to give informed consent:  YES       Discussed Risks/Benefits/Side Effects/Alternatives: YES      Discussed with patient many issues of addiction,triggers/ relapse, and establishing a solid recovery program.

## 2017-03-10 ENCOUNTER — HOSPITAL ENCOUNTER (OUTPATIENT)
Dept: BEHAVIORAL HEALTH | Facility: CLINIC | Age: 57
End: 2017-03-10
Attending: PSYCHIATRY & NEUROLOGY
Payer: COMMERCIAL

## 2017-03-10 PROCEDURE — 10020000 ZZH LODGING PLUS FACILITY CHARGE ADULT

## 2017-03-10 PROCEDURE — H2035 A/D TX PROGRAM, PER HOUR: HCPCS | Mod: HQ

## 2017-03-10 NOTE — PROGRESS NOTES
Pt was scheduled to enter programming at [facility] on 3/14. This writer received a call from [facility] stating that the patient's insurance was declined. This writer worked with the patient and faxed paperwork to [facility]. Pt has a meeting with sober  to secure sober housing.

## 2017-03-11 ENCOUNTER — HOSPITAL ENCOUNTER (OUTPATIENT)
Dept: BEHAVIORAL HEALTH | Facility: CLINIC | Age: 57
End: 2017-03-11
Attending: PSYCHIATRY & NEUROLOGY
Payer: COMMERCIAL

## 2017-03-11 PROCEDURE — H2035 A/D TX PROGRAM, PER HOUR: HCPCS | Mod: HQ

## 2017-03-11 PROCEDURE — 10020000 ZZH LODGING PLUS FACILITY CHARGE ADULT

## 2017-03-11 NOTE — PROGRESS NOTES
Nursing Discharge Planning Meeting    Writer completed discharge planning meeting with patient. Discharge is planned for Tuesday, March 14th.    Discussed appropriate follow up care to manage and to obtain medication refills. Patient given a copy of his current medications for reference. Questions answered and patient verbalized understanding of post-discharge follow up plan. Message left with the retail pharmacy requesting a Metoprolol refill. Patient to be updated by nursing as needed with medication status.   Patient to schedule an appointment with their PCP through [facility] as needed for future needs and medication refills.     Continue to support patient in discharge planning as needed to assure appropriate continuity of care.

## 2017-03-12 ENCOUNTER — HOSPITAL ENCOUNTER (OUTPATIENT)
Dept: BEHAVIORAL HEALTH | Facility: CLINIC | Age: 57
End: 2017-03-12
Attending: PSYCHIATRY & NEUROLOGY
Payer: COMMERCIAL

## 2017-03-12 PROCEDURE — 10020000 ZZH LODGING PLUS FACILITY CHARGE ADULT: Mod: GA

## 2017-03-12 PROCEDURE — H2035 A/D TX PROGRAM, PER HOUR: HCPCS | Mod: HQ

## 2017-03-13 ENCOUNTER — HOSPITAL ENCOUNTER (OUTPATIENT)
Dept: BEHAVIORAL HEALTH | Facility: CLINIC | Age: 57
End: 2017-03-13
Attending: PSYCHIATRY & NEUROLOGY
Payer: COMMERCIAL

## 2017-03-13 PROCEDURE — 10020000 ZZH LODGING PLUS FACILITY CHARGE ADULT: Mod: GA

## 2017-03-13 PROCEDURE — H2035 A/D TX PROGRAM, PER HOUR: HCPCS | Mod: HQ

## 2017-03-13 PROCEDURE — H2035 A/D TX PROGRAM, PER HOUR: HCPCS

## 2017-03-13 NOTE — PROGRESS NOTES
[facility] Discharge Summary/Instructions     Patient: Rashawn Short  MRN: [id number]   : 1960 Age: 56 year old Sex: male   -  Focus of Treatment / Discharge Recommendations  Personal Safety/ Management of Symptoms    * Follow your safety plan.  Report increased symptoms to your care team and /or go to the nearest Emergency Department.   Call crisis lines as needed:       [facility] [phone]                [facility] [phone]    [facility] [phone]               Crisis Connection [phone]    [facility] [phone]              [facility] [phone]    [facility] [phone]          National Suicide Prevention [phone]    [facility] [phone]            Suicide Prevention [phone]    [facility] [phone]    Abstinence/Relapse Prevention  * Take all medicines as directed.  Carry a current list of medicines with you.  * Use coping skills: nutrition, rest, exercise, spirituality, journal, meditation, engage in activities you enjoy, seek sober support  * Do not use illicit (street) drugs, controlled substances (narcotics) or alcohol.    Develop/Improve Independent Living/Socialization Skills: ensure living environment is conducive to recovery    Community Resources/Supports:     [facility] Alcoholics Anonymous:  [phone] ( 24 hours a day)    [place]  Alcoholics Anonymous : [phone]    Narcotics Anonymous : [address] ( [phone])    [place] Recovery Connection: Recovery resources.  [phone]    Access chats, online meetings, discussions and healthy check-in activities any day, any time, from anywhere. Participate as anonymously as you like. In order to access this resource . Go to: [email or web address]   Click on the In recovery tab. Then click on Social Community. Click on The daily Pledge: people helping people  to join.      Discharge Planning: Maintain abstinence from all  mood altering substances including alcohol, attend 2+ AA/NA meetings per week, maintain contact with a program sponsor, enter the [facility] program and follow recommendations, enter 1.1 therapy/marriage counseling, maintain good physical and mental health care.  Follow up with psychiatrist / main caregiver:  [facility]   Next visit: TBD                                                                          Follow up with your therapist: TBD    Next visit: TBD    Go to group therapy and / or support groups at: [facility].    See your medical doctor about:  Ongoing physical health care    Client Signature:_______________________   Date / Time:___________  Staff Signature:________________________   Date / Time:___________

## 2017-03-13 NOTE — PROGRESS NOTES
Patient:  Rashawn Short            Adult CD Progress Note and Treatment Plan Review     Attendance  Please refer to OP BEH CD Adult Attendance Record Documentation Flowsheet    Support group attended this week: Yes    Reporting sobriety: Yes    Treatment Plan     Treatment Plan Review competed on: 3/13/17       Client preferred learning style: Visual, Hands On    Staff Members contributing: SAYDA Mcneil, SAYDA Arzate, Lauren Gu, YISEL-T                       Received Supervision: No    Client: Contributed to goals and plan.    Client received copy of plan/revised plan: Yes    Client agrees with plan/revised plan: Yes    Changes to Treatment Plan: No    New Goals added since last review: None    Goals worked on since last review: Relationships, Relapse Prevention, Grief, Motivation, Aftercare planning    Strategies effective: Yes    Strategies need these changes: None    ASAM Risk Rating:    Dimension 1-0 Patient reports his last use date as 2/11/17. Patient reports no withdrawal symptomology at this time.    Dimension 2-1 Patient reports no biomedical issues or concerns at this time.    Dimension 3-1 Patient reports no feelings of depression, low energy, anxiety or irritability at this time. Patient reports no current suicidal ideation.    Dimension 4-0 Patient reports his desire to lead a sober life and recover his relationships, especially that with his wife, are driving motivational factors for him wanting to remain sober.    Dimension 5-3 Patient reports that his cravings rate a 1 on a scale of 1-10, with 1 being the lowest score. Patient reports his desire to remain sober is driven by focusing on the treatment program, including total surrender to his DOC, obtaining and working with a seasoned sponsor,  attending meetings and having structured time so that his triggers are placed at a disadvantage.    Dimension 6-2 Patient will be transitioning to [facility] after  his discharge from [facility]. Patient reports that the support of his family is firmly in place and that they are a huge factor regarding his desire to lead a sober life.    Any changes in Vulnerable Adult Status?  No  If yes, add to treatment plan and individual abuse prevention plan.    Family Involvement:   Family  was unable to attend family week    Data:   Offered feedback, good insight, patient did actively participate.    Intervention:   Aftercare Planning  Cognitive Behavior Therapy  Counselor Feedback  Education  Emotional Management  Group Feedback  Motivational Enhancement Therapy  Relapse Prevention  Twelve Step Facilitation  Mental Health Education    Assessment:   Stages of Change  Action     Appears/Sounds:  Cooperative  Motivated   Engaged    Plan:  Focus on recovery environment  Monitor emotional/physical health  Continue to complete treatment plan and objectives    Lauren Gu, ADC-T

## 2017-03-13 NOTE — PROGRESS NOTES
"D-Patient completed his \"Relapse Prevention Plan\" assignment and presented in group. Patient discussed what he learned about his ability to stay sober.  Patient discussed what tools he could use to support his sobriety and what changes in his lifestyle he will have to incorporate for him to stay sober, including meetings, a sponsor and sober activities. Patient discussed how LP is the beginning of forging a new life for himself and not the end of his treatment program.  I-Group therapy-Counselor reviewed norms, facilitated group and conducted feedback session with peers.  A-Patient appears to have a firm grasp on the attitude and behavior he must employ after his discharge from treatment in order to remain sober for the rest of his life.  P-Continue with treatment and treatment plan objectives.  "

## 2017-03-13 NOTE — PROGRESS NOTES
[address]oors  [place] [address]        Rashawn Short, 1960, was admitted for evaluation/treatment of chemical dependency at [facility].  This person took part in these program(s):    ______ The Inpatient Program   ______ The Outpatient Program   ___x___ [facility]   ______ Lodging Day Outpatient       Date admitted: 2/13/17  Date discharged: 3/14/17    Type of discharge:   __x____ Satisfactory - completed evaluation / treatment   ______ Discharged without completing   ______ Behavioral discharge   ______ Transferred to another chemical dependency program   ______ Transferred to another type of service   ______ Left against medical advice (AMA) / El[facility]       Comments: Pt completed program and is transitioning into outpatient at [facility].       Counselor: Lauren Gu                       Date: 3/13/2017             Time: 1:57 PM

## 2017-03-14 NOTE — PROGRESS NOTES
CHEMICAL DEPENDENCY DISCHARGE SUMMARY      EVALUATION COUNSELOR:  Chante Denton MA, [facility]    TREATMENT COUNSELOR:  Tracey Abad [facility] and Kamryn Alcantara [facility].   REFERRAL SOURCE:  Self.   PROGRAM:  [facility].   ADMISSION DATE:  02/13/2017.   LAST SESSION DATE:  03/13/2017.     DISCHARGE DATE:  03/13/2017.   ADMISSION DIAGNOSIS:  F10.20/303.90, Alcohol Use Disorder, Severe.   DISCHARGE DIAGNOSIS:  F10.20/303.90, Alcohol Use Disorder, Severe.   DISCHARGE STATUS:  The patient completed most treatment plan goals and was discharged with counselors' approval.   LAST USE DATE:  02/11/2017.    DAYS OF TREATMENT COMPLETED:  29.      PRESENTING INFORMATION:  Rashawn Short entered [facility] wanting help for his addiction to alcohol. Pt has had a history of treatments and relapse.      SERVICES PROVIDED:  Services included assessment, patient orientation, treatment planning, individual counseling, group therapy sessions, spiritual care counseling, 1:1 therapy, lectures, workshops focusing on relapse prevention, relationships, other addictions, recovery topics and aftercare planning.      ISSUES ADDRESSED IN TREATMENT:   DIMENSION 1:  Acute withdrawal issues/detox:  Admitting risk rating 0, Discharge risk rating 0.  The patient reported his last use of alcohol was on 2/11/2017.  The patient denied symptoms of withdrawal.        DIMENSION 2:  Biomedical Conditions and Complications:  Admitting risk rating 1,  Discharge risk rating 1. The patient reported having high blood pressure and appeared medication compliant.  The patient denied any biomedical conditions that would interfere with treatment programming.   The patient was able to access medical services as needed.      DIMENSION 3:  Emotional and Behavioral Conditions and Complications:  Admitting risk rating 1, Discharge risk rating 1.  Patient met with staff psychiatrist,   "Yakelin while in the [facility] and as per her note has a diagnosis of Adjustment  Dx &R/O Separation Anxiety DX.  The patient appeared medication compliant.  [facility] gain [facility] into his mental health.  Upon admission, the patient was screened for suicide risk.  With no identified risk.  The patient denied any suicidal or homicidal ideation and was monitored throughout his time in [facility], which resulted in a no current or active risk rating.  The patient reported issues with guilt and shame due to his substance use and behaviors. In order for him to develop coping skills to better deal with his guilt and shame and gain a better understanding of himself, he was to complete assignments of \"Learning to Live with Emotions\" and a \"Guilt and Shame packet\".  The patient did not complete these assignments.  He did, however, complete \" The Book of Me\" assignment gaining insight into himself.  The patient shared issues with codependency. In order to help him to address his codependent tendencies, he completed assignments of \" Meeting My Needs, Alternative Ways to [place] without the Use of Drugs or Alcohol\",  \"Codependency self-test\" and practiced asking for what he needs by using \"I\" statements.  The patient completed most of these assignments; however, did not complete reading the book,  Codependent No More.  The patient seemed to have gained some insight into his codependency, but needs to continue to work on this area.  The patient appears to have made some progress in this dimension.  However, it is recommended for him to seek one to one therapy.          DIMENSION 4:  Readiness to Change:  Admitting risk rating 0, Discharge risk rating 0.  The patient shared consequences due to his use to himself and others.  In order to help the patient increase his motivation for recovery, the patient completed the\" First Step Assignment\" and \"Alcohol and Drug Progression\".  The patient " "appears to have made progress within this dimension as evidenced by his participation within groups, program activities, AA/NA meetings, and building relationships with his peers.        DIMENSION 5:  Relapse, Continued Use, Continued Problem Potential:  Admitting risk rating 4, Discharge risk rating 3.  The patient lacked insight into his personal relapse process, triggers, warning signs and lacked coping skills.  The patient appeared able to gain insight into his personal relapse process through identifying his triggers, warning signs and developing coping skills to prevent relapse by attending relapse prevention workshops, completing assignments of stress and recovery, identifying relapse triggers and cues and a detailed relapse prevention packet.  The patient appeared to have made progress within this dimension.        DIMENSION 6:  Recovery Environment: Admission risk rating 2, Discharge risk rating 2. The patient was offered participation of family week in order to help with strained family relationships, unfortunately, the patient's family was not able to attend.  The patient lacked a sober network to support his recovery.The patient began to develop relationships with peers while in the [facility] by spending free time primarily with male peers and attending 3 AA support group meetings weekly.  The patient reported that he had a sponsor and connected with him while in the [facility].  The patient lacked sober, structured and meaningful activities.  The patient was to complete a \" List of 25 Sober Activities\" that he could participate in with others as well as do alone.  The patient did not complete this assignment.  The patient was recommended and will enter outpatient treatment at [facility] and sober housing.  The patient denied any legal issues.  The patient appears to have made some progress in this dimension, however is recommended to attend marriage counseling to address relationship " "issues with his wife over his use.      STRENGTHS  IDENTIFIED BY PATIENT:  \"God, family, self-worth and self-esteem\".      PROGNOSIS:  Favorable if he follows through with all aftercare recommendations and referrals.        LIVING ARRANGEMENTS AT DISCHARGE:  Sober housing at [place], [place] ( [facility]        CONTINUING CARE RECOMMENDATIONS AND REFERRALS:   1.  Abstain from all mood-altering substances, including alcohol.    2.  [facility] and follow all recommendations.   3.  Attend at least two 12-step meetings weekly.   4.  Maintain contact with sponsor.   5.  Continue to invest in building a sober network in recovery.   6.  Maintain good physical, mental and health care.  Monitor and comply with all advice of your doctor regarding mental and physical health.  Remain medication compliant.   7.  Seek one-to-one therapy and marriage counseling  8.  Continue to monitor and understand relapse triggers and stressors through the use and development of healthy coping skills.   9.  Continue to practice stress management and maintain life balance.     10.  Continue to pursue employment or volunteer opportunities and engage in sober fun activities.         This information has been disclosed to you from records protected by Federal confidentiality rules (42 CFR part 2). The Federal rules prohibit you from making any further disclosure of this information unless further disclosure is expressly permitted by the written consent of the person to whom it pertains or as otherwise permitted by 42 CFR part 2. A general authorization for the release of medical or other information is NOT sufficient for this purpose. The Federal rules restrict any use of the information to criminally investigate or prosecute any alcohol or drug abuse patient.      SAYDA PINTO             D: 03/14/2017 16:13   T: 03/14/2017 16:48   MT: SELENA      Name:     LATRICE ROME   MRN:      [id number]        " Account:      [id number]   :      1960           Visit Date:   2017      Document: [id number]

## 2017-09-15 ENCOUNTER — TRANSFERRED RECORDS (OUTPATIENT)
Dept: HEALTH INFORMATION MANAGEMENT | Facility: CLINIC | Age: 57
End: 2017-09-15

## 2017-09-18 ENCOUNTER — TELEPHONE (OUTPATIENT)
Dept: CARDIOLOGY | Facility: CLINIC | Age: 57
End: 2017-09-18

## 2017-09-19 ENCOUNTER — OFFICE VISIT (OUTPATIENT)
Dept: CARDIOLOGY | Facility: CLINIC | Age: 57
End: 2017-09-19
Payer: COMMERCIAL

## 2017-09-19 VITALS
HEIGHT: 71 IN | DIASTOLIC BLOOD PRESSURE: 97 MMHG | HEART RATE: 72 BPM | SYSTOLIC BLOOD PRESSURE: 181 MMHG | WEIGHT: 206.8 LBS | BODY MASS INDEX: 28.95 KG/M2

## 2017-09-19 DIAGNOSIS — I48.0 PAROXYSMAL ATRIAL FIBRILLATION (H): Primary | ICD-10-CM

## 2017-09-19 DIAGNOSIS — I10 HYPERTENSION GOAL BP (BLOOD PRESSURE) < 140/90: ICD-10-CM

## 2017-09-19 PROCEDURE — 93005 ELECTROCARDIOGRAM TRACING: CPT | Performed by: INTERNAL MEDICINE

## 2017-09-19 PROCEDURE — 99204 OFFICE O/P NEW MOD 45 MIN: CPT | Mod: 25 | Performed by: INTERNAL MEDICINE

## 2017-09-19 NOTE — PROGRESS NOTES
REASON FOR VISIT:  Evaluation of paroxysmal atrial fibrillation.      HISTORY OF PRESENT ILLNESS:  Mr. Rome is a pleasant 57-year-old gentleman with history of hypertension, hyperlipidemia, peptic ulcer disease (treated a year ago), who is here for evaluation of paroxysmal atrial fibrillation.      The patient had a recent routine physical with his PCP.  During physical exam, he was found to have irregular heart beat and an EKG confirmed atrial fibrillation with heart rate at 160 beats per minute; therefore, he was referred here for evaluation.      At the moment he is doing well.  He informs that he is completely asymptomatic with atrial fibrillation.  He denies any symptoms of chest pain, shortness of breath, lightheadedness, syncope or syncopal episode.  EKG is actually normal rhythm today.      Blood pressure is elevated here on physical exam however, he admits that he did not take any of his blood pressure medications today yet.        PLAN:   1.  Paroxysmal atrial fibrillation.  He is asymptomatic with it.  Since this was the first time he had atrial fibrillation and he was asymptomatic, we will continue conservative approach with metoprolol therapy.  He will follow up with me in 4-6 months to reevaluate symptoms and atrial fibrillation burden.   2.  Embolic prevention.  CHADS-VASc score of 1.  He was started on baby aspirin.     3.  Hypertension.  Blood pressure is elevated, however, he did not take any medication today.  He is instructed to take his medications right now.   4.  Followup care.  Follow up in clinic with me in 4-6 months.   5.  Of note, I also recommended an echocardiogram to evaluate cardiac function.         MANOHAR FONTANA MD             D: 2017 13:24   T: 2017 17:56   MT: LEW      Name:     LATRICE ROME   MRN:      [id number]        Account:      [id number]   :      1960           Service Date: 2017      Document: [id number]

## 2017-09-19 NOTE — LETTER
9/19/2017    Ludwig Carrillo MD  [address] [facility] [address]  [place] [address]    RE: Rashawn Short       Dear Colleague,    I had the pleasure of seeing Rashawn Short in the [facility].    REASON FOR VISIT:  Evaluation of paroxysmal atrial fibrillation.      Mr. Short is a pleasant 57-year-old gentleman with history of hypertension, hyperlipidemia, peptic ulcer disease (treated a year ago), who is here for evaluation of paroxysmal atrial fibrillation.      The patient had a recent routine physical with his PCP.  During physical exam, he was found to have irregular heart beat and an EKG confirmed atrial fibrillation with heart rate at 160 beats per minute; therefore, he was referred here for evaluation.      At the moment he is doing well.  He informs that he is completely asymptomatic with atrial fibrillation.  He denies any symptoms of chest pain, shortness of breath, lightheadedness, syncope or syncopal episode.  EKG is actually normal rhythm today.      Blood pressure is elevated here on physical exam however, he admits that he did not take any of his blood pressure medications today yet.       Outpatient Encounter Prescriptions as of 9/19/2017   Medication Sig Dispense Refill     [DISCONTINUED] escitalopram (LEXAPRO) 10 MG tablet Take 1 tablet (10 mg) by mouth daily 30 tablet 0     cloNIDine (CATAPRES) 0.1 MG tablet Take 0.1 mg by mouth 2 times daily       lisinopril (PRINIVIL/ZESTRIL) 20 MG tablet Take 20 mg by mouth daily       hydrochlorothiazide (HYDRODIURIL) 25 MG tablet Take 25 mg by mouth daily       metoprolol (LOPRESSOR) 50 MG tablet Take 50 mg by mouth 2 times daily        Multiple Vitamins-Minerals (MULTIVITAMIN ADULTS 50+) TABS Take 1 tablet by mouth       aspirin 325 MG tablet Take 1 tablet by mouth daily. 30 tablet 0     thiamine 100 MG tablet Take 1 tablet by mouth daily. 30 tablet 0     fish oil-omega-3 fatty acids 1000 MG capsule Take 2 capsules by mouth daily.  (Patient taking differently: Take 1 g by mouth daily ) 30 capsule 0     Facility-Administered Encounter Medications as of 9/19/2017   Medication Dose Route Frequency Provider Last Rate Last Dose     Self Administer Medications: Behavioral Services   Does not apply See Admin Instructions Kareem Senior MD          PLAN:   1.  Paroxysmal atrial fibrillation.  He is asymptomatic with it.  Since this was the first time he had atrial fibrillation and he was asymptomatic, we will continue conservative approach with metoprolol therapy.  He will follow up with me in 4-6 months to reevaluate symptoms and atrial fibrillation burden.   2.  Embolic prevention.  CHADS-VASc score of 1.  He was started on baby aspirin.     3.  Hypertension.  Blood pressure is elevated, however, he did not take any medication today.  He is instructed to take his medications right now.   4.  Followup care.  Follow up in clinic with me in 4-6 months.   5.  Of note, I also recommended an echocardiogram to evaluate cardiac function.     Again, thank you for allowing me to participate in the care of your patient.      Sincerely,    Manuel Romero MD     [facility]

## 2017-09-19 NOTE — MR AVS SNAPSHOT
"              After Visit Summary   9/19/2017    Rashawn Short    MRN: [id number]           Patient Information     Date Of Birth          1960        Visit Information        Provider Department      9/19/2017 12:45 PM Manuel Romero MD [facility]        Today's Diagnoses     Paroxysmal atrial fibrillation (H)    -  1    Hypertension goal BP (blood pressure) < 140/90           Follow-ups after your visit        Future tests that were ordered for you today     Open Future Orders        Priority Expected Expires Ordered    Echocardiogram Routine 9/26/2017 9/19/2018 9/19/2017            Who to contact     If you have questions or need follow up information about today's clinic visit or your schedule please contact [facility] directly at [phone].  Normal or non-critical lab and imaging results will be communicated to you by MyChart, letter or phone within 4 business days after the clinic has received the results. If you do not hear from us within 7 days, please contact the clinic through MyChart or phone. If you have a critical or abnormal lab result, we will notify you by phone as soon as possible.  Submit refill requests through [employer] or call your pharmacy and they will forward the refill request to us. Please allow 3 business days for your refill to be completed.          Additional Information About Your Visit        Key [facility] Information     [employer] lets you send messages to your doctor, view your test results, renew your prescriptions, schedule appointments and more. To sign up, go to [email or web address] . Click on \"Log in\" on the left side of the screen, which will take you to the Welcome page. Then click on \"Sign up Now\" on the right side of the page.     You will be asked to enter the access code listed below, as well as some personal information. Please follow the directions to create your username and " "password.     Your access code is: [email or web address]  Expires: 2017  1:25 PM     Your access code will  in 90 days. If you need help or a new code, please call your [place] clinic or [phone].        Care EveryWhere ID     This is your Care EveryWhere ID. This could be used by other organizations to access your [place] medical records  [id number]        Your Vitals Were     Pulse Height BMI (Body Mass Index)             72 1.803 m (5' 11\") 28.84 kg/m2          Blood Pressure from Last 3 Encounters:   17 (!) 181/97   17 102/66   17 (!) 164/113    Weight from Last 3 Encounters:   17 93.8 kg (206 lb 12.8 oz)   17 88 kg (194 lb)   17 88.4 kg (194 lb 12.8 oz)              We Performed the Following     EKG 12-lead complete w/read - Clinics (performed today)          Today's Medication Changes          These changes are accurate as of: 17  1:25 PM.  If you have any questions, ask your nurse or doctor.               These medicines have changed or have updated prescriptions.        Dose/Directions    aspirin 325 MG tablet   This may have changed:  how much to take   Used for:  HTN (hypertension)        Dose:  325 mg   Take 1 tablet by mouth daily.   Quantity:  30 tablet   Refills:  0       fish oil-omega-3 fatty acids 1000 MG capsule   This may have changed:  how much to take   Used for:  HTN (hypertension)        Dose:  2 g   Take 2 capsules by mouth daily.   Quantity:  30 capsule   Refills:  0                Primary Care Provider Office Phone # Fax #    Ludwig Carrillo [phone] [address] [address]        Equal Access to Services     MARIANNE ANN : Marnie Choi, sade hall, isiah huffmanalluis yadav, janette tolentino. So [facility] [phone].    ATENCIÓN: Si habla español, tiene a griffith disposición servicios gratuitos de asistencia lingüística. Llame al [phone].    We " comply with applicable federal civil rights laws and [place] laws. We do not discriminate on the basis of race, color, national origin, age, disability sex, sexual orientation or gender identity.            Thank you!     Thank you for choosing [facility] [place]  for your care. Our goal is always to provide you with excellent care. Hearing back from our patients is one way we can continue to improve our services. Please take a few minutes to complete the written survey that you may receive in the mail after your visit with us. Thank you!             Your Updated Medication List - Protect others around you: Learn how to safely use, store and throw away your medicines at www.disposemymeds.org.          This list is accurate as of: 9/19/17  1:25 PM.  Always use your most recent med list.                   Brand Name Dispense Instructions for use Diagnosis    aspirin 325 MG tablet     30 tablet    Take 1 tablet by mouth daily.    HTN (hypertension)       cloNIDine 0.1 MG tablet    CATAPRES     Take 0.1 mg by mouth 2 times daily        escitalopram 10 MG tablet    LEXAPRO    30 tablet    Take 1 tablet (10 mg) by mouth daily    Adjustment disorder with anxious mood       fish oil-omega-3 fatty acids 1000 MG capsule     30 capsule    Take 2 capsules by mouth daily.    HTN (hypertension)       hydrochlorothiazide 25 MG tablet    HYDRODIURIL     Take 25 mg by mouth daily        lisinopril 20 MG tablet    PRINIVIL/ZESTRIL     Take 20 mg by mouth daily        metoprolol 50 MG tablet    LOPRESSOR     Take 50 mg by mouth 2 times daily        MULTIVITAMIN ADULTS 50+ Tabs      Take 1 tablet by mouth        thiamine 100 MG tablet     30 tablet    Take 1 tablet by mouth daily.    EtOH dependence (H)

## 2017-09-27 ENCOUNTER — HOSPITAL ENCOUNTER (OUTPATIENT)
Dept: CARDIOLOGY | Facility: CLINIC | Age: 57
Discharge: HOME | End: 2017-09-27
Attending: INTERNAL MEDICINE | Admitting: INTERNAL MEDICINE
Payer: COMMERCIAL

## 2017-09-27 DIAGNOSIS — I48.0 PAROXYSMAL ATRIAL FIBRILLATION (H): ICD-10-CM

## 2017-09-27 PROCEDURE — 93306 TTE W/DOPPLER COMPLETE: CPT

## 2017-09-27 PROCEDURE — 93306 TTE W/DOPPLER COMPLETE: CPT | Mod: 26 | Performed by: INTERNAL MEDICINE

## 2017-09-28 ENCOUNTER — TELEPHONE (OUTPATIENT)
Dept: CARDIOLOGY | Facility: CLINIC | Age: 57
End: 2017-09-28

## 2017-09-28 NOTE — TELEPHONE ENCOUNTER
Notes Recorded by Manuel Romero MD on 9/28/2017 at 4:06 PM  Normal cardiac function.  Please update pt on results.   Called and left message with normal echo results after reviewed by Dr Romero.

## 2017-10-10 ENCOUNTER — HOSPITAL ENCOUNTER (EMERGENCY)
Facility: CLINIC | Age: 57
Discharge: HOME | End: 2017-10-10
Attending: FAMILY MEDICINE | Admitting: FAMILY MEDICINE
Payer: COMMERCIAL

## 2017-10-10 VITALS
DIASTOLIC BLOOD PRESSURE: 67 MMHG | BODY MASS INDEX: 27.89 KG/M2 | OXYGEN SATURATION: 94 % | HEART RATE: 56 BPM | WEIGHT: 200 LBS | TEMPERATURE: 97 F | RESPIRATION RATE: 17 BRPM | SYSTOLIC BLOOD PRESSURE: 125 MMHG

## 2017-10-10 DIAGNOSIS — F10.20 ALCOHOL DEPENDENCE, CONTINUOUS DRINKING BEHAVIOR (H): ICD-10-CM

## 2017-10-10 DIAGNOSIS — F10.229 ACUTE ALCOHOLIC INTOXICATION IN ALCOHOLISM WITH COMPLICATION, CONTINUOUS DRINKING BEHAVIOR (H): ICD-10-CM

## 2017-10-10 LAB — ALCOHOL BREATH TEST: 0.29 (ref 0–0.01)

## 2017-10-10 PROCEDURE — 82075 ASSAY OF BREATH ETHANOL: CPT | Performed by: FAMILY MEDICINE

## 2017-10-10 PROCEDURE — 99285 EMERGENCY DEPT VISIT HI MDM: CPT | Performed by: FAMILY MEDICINE

## 2017-10-10 PROCEDURE — 99284 EMERGENCY DEPT VISIT MOD MDM: CPT | Mod: Z6 | Performed by: FAMILY MEDICINE

## 2017-10-10 RX ORDER — HYDROCHLOROTHIAZIDE 25 MG/1
25 TABLET ORAL DAILY
Qty: 3 TABLET | Refills: 0 | Status: SHIPPED | OUTPATIENT
Start: 2017-10-10 | End: 2017-12-11

## 2017-10-10 RX ORDER — METOPROLOL TARTRATE 50 MG
50 TABLET ORAL 2 TIMES DAILY
Qty: 6 TABLET | Refills: 0 | Status: SHIPPED | OUTPATIENT
Start: 2017-10-10 | End: 2017-12-11

## 2017-10-10 RX ORDER — ASPIRIN 325 MG
325 TABLET, DELAYED RELEASE (ENTERIC COATED) ORAL DAILY
Qty: 3 TABLET | Refills: 0 | Status: SHIPPED | OUTPATIENT
Start: 2017-10-10 | End: 2017-12-11

## 2017-10-10 RX ORDER — CLONIDINE HYDROCHLORIDE 0.1 MG/1
0.1 TABLET ORAL 2 TIMES DAILY
Qty: 6 TABLET | Refills: 0 | Status: SHIPPED | OUTPATIENT
Start: 2017-10-10 | End: 2017-10-13

## 2017-10-10 RX ORDER — LISINOPRIL 20 MG/1
20 TABLET ORAL DAILY
Qty: 3 TABLET | Refills: 0 | Status: SHIPPED | OUTPATIENT
Start: 2017-10-10 | End: 2017-12-11

## 2017-10-10 NOTE — ED AVS SNAPSHOT
[facility], Emergency Department    [address]    [place] [address]    Phone:  [phone]    Fax:  [phone]                                       Rashawn Short   MRN: [id number]    Department:  [facility], Emergency Department   Date of Visit:  10/10/2017           Patient Information     Date Of Birth          1960        Your diagnoses for this visit were:     Alcohol dependence, continuous drinking behavior (H)        You were seen by Geovanny Dwyer MD.        Discharge Instructions       Thank you for choosing [facility].     Please closely monitor for further symptoms. Return to the Emergency Department if you develop any new or worsening signs or symptoms.    If you received any opiate pain medications or sedatives during your visit, please do not drive for at least 8 hours.     Labs, cultures or final xray interpretations may still need to be reviewed.  We will call you if your plan of care needs to be changed.    Please follow up with your primary care physician or clinic.  If you desire chemical dependency assessment or counseling, follow up with [facility]: [phone]      Discharge References/Attachments     ALCOHOL WITHDRAWAL: WHAT TO EXPECT (ENGLISH)    ALCOHOL ABUSE (ENGLISH)    ADDICTION, RECOVERING: COPING WITH RELAPSE (ENGLISH)      24 Hour Appointment Hotline       To make an appointment at any [facility] clinic, call [phone] ([phone]). If you don't have a family doctor or clinic, we will help you find one. [facility] clinics are conveniently located to serve the needs of you and your family.             Review of your medicines      Our records show that you are taking the medicines listed below. If these are incorrect, please call your family doctor or clinic.        Dose / Directions Last dose taken    fish oil-omega-3 fatty acids 1000 MG capsule   Dose:  2 g   Quantity:  30 capsule        Take 2 capsules by  mouth daily.   Refills:  0        MULTIVITAMIN ADULTS 50+ Tabs   Dose:  1 tablet        Take 1 tablet by mouth   Refills:  0        thiamine 100 MG tablet   Dose:  100 mg   Quantity:  30 tablet        Take 1 tablet by mouth daily.   Refills:  0          ASK your doctor about these medications        Dose / Directions Last dose taken    * aspirin 325 MG tablet   Dose:  325 mg   What changed:  Another medication with the same name was added. Make sure you understand how and when to take each.   Quantity:  30 tablet   Ask about: Which instructions should I use?        Take 1 tablet by mouth daily.   Refills:  0        * aspirin 325 MG EC tablet   Dose:  325 mg   What changed:  You were already taking a medication with the same name, and this prescription was added. Make sure you understand how and when to take each.   Quantity:  3 tablet   Ask about: Which instructions should I use?        Take 1 tablet (325 mg) by mouth daily   Refills:  0        * cloNIDine 0.1 MG tablet   Commonly known as:  CATAPRES   Dose:  0.1 mg   What changed:  Another medication with the same name was added. Make sure you understand how and when to take each.   Ask about: Which instructions should I use?        Take 0.1 mg by mouth 2 times daily   Refills:  0        * cloNIDine 0.1 MG tablet   Commonly known as:  CATAPRES   Dose:  0.1 mg   What changed:  You were already taking a medication with the same name, and this prescription was added. Make sure you understand how and when to take each.   Quantity:  6 tablet   Ask about: Which instructions should I use?        Take 1 tablet (0.1 mg) by mouth 2 times daily for 3 days   Refills:  0        * hydrochlorothiazide 25 MG tablet   Commonly known as:  HYDRODIURIL   Dose:  25 mg   What changed:  Another medication with the same name was added. Make sure you understand how and when to take each.   Ask about: Which instructions should I use?        Take 25 mg by mouth daily   Refills:  0        *  hydrochlorothiazide 25 MG tablet   Commonly known as:  HYDRODIURIL   Dose:  25 mg   What changed:  You were already taking a medication with the same name, and this prescription was added. Make sure you understand how and when to take each.   Quantity:  3 tablet   Ask about: Which instructions should I use?        Take 1 tablet (25 mg) by mouth daily   Refills:  0        * lisinopril 20 MG tablet   Commonly known as:  PRINIVIL/ZESTRIL   Dose:  20 mg   What changed:  Another medication with the same name was added. Make sure you understand how and when to take each.   Ask about: Which instructions should I use?        Take 20 mg by mouth daily   Refills:  0        * lisinopril 20 MG tablet   Commonly known as:  PRINIVIL/ZESTRIL   Dose:  20 mg   What changed:  You were already taking a medication with the same name, and this prescription was added. Make sure you understand how and when to take each.   Quantity:  3 tablet   Ask about: Which instructions should I use?        Take 1 tablet (20 mg) by mouth daily   Refills:  0        * metoprolol 50 MG tablet   Commonly known as:  LOPRESSOR   Dose:  50 mg   What changed:  Another medication with the same name was added. Make sure you understand how and when to take each.   Ask about: Which instructions should I use?        Take 50 mg by mouth 2 times daily   Refills:  0        * metoprolol 50 MG tablet   Commonly known as:  LOPRESSOR   Dose:  50 mg   What changed:  You were already taking a medication with the same name, and this prescription was added. Make sure you understand how and when to take each.   Quantity:  6 tablet   Ask about: Which instructions should I use?        Take 1 tablet (50 mg) by mouth 2 times daily   Refills:  0        * Notice:  This list has 10 medication(s) that are the same as other medications prescribed for you. Read the directions carefully, and ask your doctor or other care provider to review them with you.            Prescriptions were  "sent or printed at these locations (5 Prescriptions)                   Other Prescriptions                Printed at Department/Unit printer (5 of 5)         cloNIDine (CATAPRES) 0.1 MG tablet               hydrochlorothiazide (HYDRODIURIL) 25 MG tablet               lisinopril (PRINIVIL/ZESTRIL) 20 MG tablet               metoprolol (LOPRESSOR) 50 MG tablet               aspirin 325 MG EC tablet                Procedures and tests performed during your visit     Alcohol breath test POCT      Orders Needing Specimen Collection     Ordered          10/10/17 1634  Drug abuse screen 6 urine (chem dep) - ROUTINE, Pri[facility]: Routine, Needs to be Collected     Scheduled Task Status   10/10/17 1635 Collect Drug abuse screen 6 urine (chem dep) Open   Order Class:  PCU Collect                  Pending Results     No orders found from 10/8/2017 to 10/11/2017.            Pending Culture Results     No orders found from 10/8/2017 to 10/11/2017.            Pending Results Instructions     If you had any lab results that were not finalized at the time of your Discharge, you can call the ED Lab Result RN at [phone]. You will be contacted by this team for any positive Lab results or changes in treatment. The nurses are available 7 days a week from 10A to 6:30P.  You can leave a message 24 hours per day and they will return your call.        Thank you for choosing [facility]       Thank you for choosing [facility] for your care. Our goal is always to provide you with excellent care. Hearing back from our patients is one way we can continue to improve our services. Please take a few minutes to complete the written survey that you may receive in the mail after you visit with us. Thank you!        [employer] Information     [employer] lets you send messages to your doctor, view your test results, renew your prescriptions, schedule appointments and more. To sign up, go to [email or web address] . Click on \"Log in\" on the left side of the " "screen, which will take you to the Welcome page. Then click on \"Sign up Now\" on the right side of the page.     You will be asked to enter the access code listed below, as well as some personal information. Please follow the directions to create your username and password.     Your access code is: [email or web address]  Expires: 2017  1:25 PM     Your access code will  in 90 days. If you need help or a new code, please call your [facility] clinic or [phone].        Care EveryWhere ID     This is your Care EveryWhere ID. This could be used by other organizations to access your [facility] medical records  [id number]        Equal Access to Services     MARIANNE ANN : Marnie Choi, sade hall, isiah yadav, janette tolentino. So [facility] [phone].    ATENCIÓN: Si habla español, tiene a griffith disposición servicios gratuitos de asistencia lingüística. Llame al [phone].    We comply with applicable federal civil rights laws and [place] laws. We do not discriminate on the basis of race, color, national origin, age, disability, sex, sexual orientation, or gender identity.            After Visit Summary       This is your record. Keep this with you and show to your community pharmacist(s) and doctor(s) at your next visit.                  "

## 2017-10-10 NOTE — ED AVS SNAPSHOT
[facility], [place], Emergency Department    [address]    [facility] [address]    Phone:  [phone]    Fax:  [phone]                                       Rashawn Short   MRN: [id number]    Department:  [facility], Emergency Department   Date of Visit:  10/10/2017           After Visit Summary Signature Page     I have received my discharge instructions, and my questions have been answered. I have discussed any challenges I see with this plan with the nurse or doctor.    ..........................................................................................................................................  Patient/Patient Representative Signature      ..........................................................................................................................................  Patient Representative Print Name and Relationship to Patient    ..................................................               ................................................  Date                                            Time    ..........................................................................................................................................  Reviewed by Signature/Title    ...................................................              ..............................................  Date                                                            Time

## 2017-10-10 NOTE — DISCHARGE INSTRUCTIONS
Thank you for choosing [facility].     Please closely monitor for further symptoms. Return to the Emergency Department if you develop any new or worsening signs or symptoms.    If you received any opiate pain medications or sedatives during your visit, please do not drive for at least 8 hours.     Labs, cultures or final xray interpretations may still need to be reviewed.  We will call you if your plan of care needs to be changed.    Please follow up with your primary care physician or clinic.  If you desire chemical dependency assessment or counseling, follow up with [facility]: [phone]

## 2017-10-10 NOTE — ED PROVIDER NOTES
History     Chief Complaint   Patient presents with     Alcohol Intoxication     Detox from alcohol, last drink 20 minutes ago, drinking beer.     HPI  Rashawn Short is a 57 year old male who presents due to alcohol abuse.  Patient states he has a long-standing history of alcoholism.  He was living in a sober house but was sneaking alcohol.  For the last 2 weeks he has been drinking up to 15 beers and a pint of vodka per day.  He feels shaky and sweaty in the morning until he drinks.  No history of DTs or seizures.  He does have a history of hypertension and paroxysmal atrial fibrillation.  He denies any medical symptoms at this time.  He denies any other substances of abuse and has no other complaints or concerns.    I have reviewed the Medications, Allergies, Past Medical and Surgical History, and Social History in the Epic system.    Review of Systems  All other systems were reviewed and are negative    Physical Exam   BP: 145/89  Pulse: 58  Temp: 96.6  F (35.9  C)  Resp: 16  Weight: 90.7 kg (200 lb)  SpO2: 99 %       Physical Exam   Constitutional: He is oriented to person, place, and time. He appears well-developed and well-nourished.   HENT:   Head: Normocephalic and atraumatic.   Mouth/Throat: Oropharynx is clear and moist.   Eyes: EOM are normal. Pupils are equal, round, and reactive to light.   Neck: Normal range of motion. Neck supple. No tracheal deviation present. No thyromegaly present.   Cardiovascular: Normal rate, regular rhythm, normal heart sounds and intact distal pulses.  Exam reveals no gallop and no friction rub.    No murmur heard.  Pulmonary/Chest: Effort normal and breath sounds normal. He exhibits no tenderness.   Abdominal: Soft. Bowel sounds are normal. He exhibits no distension and no mass. There is no tenderness.   Musculoskeletal: He exhibits no edema or tenderness.   Neurological: He is alert and oriented to person, place, and time. No cranial nerve deficit. Coordination normal.    Skin: Skin is warm and dry. No rash noted.   Psychiatric: His behavior is normal. Thought content normal. His speech is slurred (slightly, consistent with high blood alcohol level). Cognition and memory are normal. He exhibits a depressed mood.   Nursing note and vitals reviewed.      ED Course     ED Course     Procedures             Critical Care time:  none             Labs Ordered and Resulted from Time of ED Arrival Up to the Time of Departure from the ED   ALCOHOL BREATH TEST POCT - Abnormal; Notable for the following:        Result Value    Alcohol Breath Test 0.294 (*)     All other components within normal limits   DRUG ABUSE SCREEN 6 CHEM DEP URINE ([facility])   STRICT INTAKE AND OUTPUT            Assessments & Plan (with Medical Decision Making)   Patient with a history of alcohol abuse and dependence is here seeking detox.  Unfortunately no detox beds are available here.  He has no other acute psychiatric or medical issues.  I was able to locate an available bed at detox facility at [address] [place] [address].  We will provide 3 days' worth of his blood pressure medications and transported via ambulance.  Patient is in agreement.  He is medically stable.    I have reviewed the nursing notes.    I have reviewed the findings, diagnosis, plan and need for follow up with the patient.    New Prescriptions    ASPIRIN 325 MG EC TABLET    Take 1 tablet (325 mg) by mouth daily    CLONIDINE (CATAPRES) 0.1 MG TABLET    Take 1 tablet (0.1 mg) by mouth 2 times daily for 3 days    HYDROCHLOROTHIAZIDE (HYDRODIURIL) 25 MG TABLET    Take 1 tablet (25 mg) by mouth daily    LISINOPRIL (PRINIVIL/ZESTRIL) 20 MG TABLET    Take 1 tablet (20 mg) by mouth daily    METOPROLOL (LOPRESSOR) 50 MG TABLET    Take 1 tablet (50 mg) by mouth 2 times daily       Final diagnoses:   Alcohol dependence, continuous drinking behavior (H)       10/10/2017   [facility], [place], EMERGENCY DEPARTMENT     Geovanny Dwyer MD  10/10/17 1627       Geovanny Dwyer,  MD  10/10/17 1972

## 2017-10-10 NOTE — ED NOTES
Pt is resting.  Nurse called [address] [place] [address] and gave report to Leeanne.

## 2017-12-11 ENCOUNTER — HOSPITAL ENCOUNTER (EMERGENCY)
Facility: CLINIC | Age: 57
Discharge: HOME | End: 2017-12-11
Attending: EMERGENCY MEDICINE | Admitting: EMERGENCY MEDICINE
Payer: COMMERCIAL

## 2017-12-11 VITALS
HEART RATE: 68 BPM | RESPIRATION RATE: 16 BRPM | OXYGEN SATURATION: 94 % | SYSTOLIC BLOOD PRESSURE: 163 MMHG | TEMPERATURE: 97.1 F | DIASTOLIC BLOOD PRESSURE: 101 MMHG

## 2017-12-11 DIAGNOSIS — F10.220 ACUTE ALCOHOLIC INTOXICATION IN ALCOHOLISM WITHOUT COMPLICATION (H): ICD-10-CM

## 2017-12-11 DIAGNOSIS — R41.82 ALTERED MENTAL STATUS, UNSPECIFIED ALTERED MENTAL STATUS TYPE: ICD-10-CM

## 2017-12-11 LAB — ALCOHOL BREATH TEST: 0.27 (ref 0–0.01)

## 2017-12-11 PROCEDURE — 99284 EMERGENCY DEPT VISIT MOD MDM: CPT | Mod: Z6 | Performed by: EMERGENCY MEDICINE

## 2017-12-11 PROCEDURE — 99285 EMERGENCY DEPT VISIT HI MDM: CPT | Performed by: EMERGENCY MEDICINE

## 2017-12-11 PROCEDURE — 82075 ASSAY OF BREATH ETHANOL: CPT | Performed by: EMERGENCY MEDICINE

## 2017-12-11 RX ORDER — ASPIRIN 325 MG
325 TABLET, DELAYED RELEASE (ENTERIC COATED) ORAL DAILY
Qty: 3 TABLET | Refills: 0 | Status: SHIPPED | OUTPATIENT
Start: 2017-12-11 | End: 2021-01-06 | Stop reason: ALTCHOICE

## 2017-12-11 RX ORDER — METOPROLOL TARTRATE 50 MG
50 TABLET ORAL 2 TIMES DAILY
Qty: 6 TABLET | Refills: 0 | Status: SHIPPED | OUTPATIENT
Start: 2017-12-11 | End: 2018-06-27

## 2017-12-11 RX ORDER — LISINOPRIL 20 MG/1
20 TABLET ORAL DAILY
Qty: 3 TABLET | Refills: 0 | Status: SHIPPED | OUTPATIENT
Start: 2017-12-11 | End: 2020-12-28

## 2017-12-11 RX ORDER — HYDROCHLOROTHIAZIDE 25 MG/1
25 TABLET ORAL DAILY
Qty: 3 TABLET | Refills: 0 | Status: ON HOLD | OUTPATIENT
Start: 2017-12-11 | End: 2020-12-29

## 2017-12-11 NOTE — ED AVS SNAPSHOT
[facility], [place], Emergency Department    [address]    [facility] [address]    Phone:  [phone]    Fax:  [phone]                                       Rashawn Short   MRN: [id number]    Department:  [facility], Emergency Department   Date of Visit:  12/11/2017           After Visit Summary Signature Page     I have received my discharge instructions, and my questions have been answered. I have discussed any challenges I see with this plan with the nurse or doctor.    ..........................................................................................................................................  Patient/Patient Representative Signature      ..........................................................................................................................................  Patient Representative Print Name and Relationship to Patient    ..................................................               ................................................  Date                                            Time    ..........................................................................................................................................  Reviewed by Signature/Title    ...................................................              ..............................................  Date                                                            Time

## 2017-12-11 NOTE — ED AVS SNAPSHOT
[facility], Emergency Department    [address]    [place] [address]    Phone:  [phone]    Fax:  [phone]                                       Rashawn Short   MRN: [id number]    Department:  [facility] Emergency Department   Date of Visit:  12/11/2017           Patient Information     Date Of Birth          1960        Your diagnoses for this visit were:     Altered mental status, unspecified altered mental status type     Acute alcoholic intoxication in alcoholism without complication (H)        You were seen by Tim Epstein MD.      Follow-up Information     Schedule an appointment as soon as possible for a visit with Ludwig Carrillo MD.    Specialty:  Family Practice    Contact information:    [address] [place] [address] [phone]          Follow up with [facility], Emergency Department.    Specialty:  EMERGENCY MEDICINE    Why:  If symptoms worsen    Contact information:    [address]  [facility] [address] [phone]    Additional information:    The [facility] is located in the [facility]. lt is easily accessible from virtually any point in the [facility] area, via [address]        Discharge Instructions         Alcohol Addiction  Are you addicted to alcohol?    You may be addicted to alcohol if your drinking harms yourself or others or leads to other problems with your daily life.  The medical term for this is alcohol use disorder. Your healthcare provider may diagnose you with this disorder if you have at least two of the following problems within the span of a year:    You drink alcohol in larger amounts or for a longer period than you intended.    You frequently want to cut down or control alcohol use, or have frequently failed efforts to do so.    You spend a lot of time getting alcohol, using alcohol, or recovering from alcohol use.    You crave or have a strong desire or urge to drink  alcohol.    Ongoing alcohol use makes it hard for you to be responsible at work, school, or home.    You continue to use alcohol even though you have had problems in relationships or social settings because of it.    You give up or miss important social, work, or recreational activities because of alcohol use.    You drink alcohol in situations in which it is physically unsafe, such as drinking then driving while intoxicated.    You continue to drink alcohol despite knowing it has caused physical or emotional problems.    You need more and more alcohol to get the same effects.    You hide how much alcohol you drink from family and friends.    You have withdrawal symptoms or use alcohol to avoid withdrawal symptoms.  Date Last Reviewed: 2/1/2017 2000-2017 [employer]. [address] [place] [address], [place] [address]. All rights reserved. This information is not intended as a substitute for professional medical care. Always follow your healthcare professional's instructions.          24 Hour Appointment Hotline       To make an appointment at any [facility], call [phone] ([phone]). If you don't have a family doctor or clinic, we will help you find one. [facility] are conveniently located to serve the needs of you and your family.             Review of your medicines      CONTINUE these medicines which may have CHANGED, or have new prescriptions. If we are uncertain of the size of tablets/capsules you have at home, strength may be listed as something that might have changed.        Dose / Directions Last dose taken    aspirin 325 MG EC tablet   Dose:  325 mg   What changed:  Another medication with the same name was removed. Continue taking this medication, and follow the directions you see here.   Quantity:  3 tablet        Take 1 tablet (325 mg) by mouth daily   Refills:  0        hydrochlorothiazide 25 MG tablet   Commonly known as:  HYDRODIURIL   Dose:  25 mg   What changed:   Another medication with the same name was removed. Continue taking this medication, and follow the directions you see here.   Quantity:  3 tablet        Take 1 tablet (25 mg) by mouth daily   Refills:  0        lisinopril 20 MG tablet   Commonly known as:  PRINIVIL/ZESTRIL   Dose:  20 mg   What changed:  Another medication with the same name was removed. Continue taking this medication, and follow the directions you see here.   Quantity:  3 tablet        Take 1 tablet (20 mg) by mouth daily   Refills:  0        metoprolol 50 MG tablet   Commonly known as:  LOPRESSOR   Dose:  50 mg   What changed:  Another medication with the same name was removed. Continue taking this medication, and follow the directions you see here.   Quantity:  6 tablet        Take 1 tablet (50 mg) by mouth 2 times daily   Refills:  0          Our records show that you are taking the medicines listed below. If these are incorrect, please call your family doctor or clinic.        Dose / Directions Last dose taken    cloNIDine 0.1 MG tablet   Commonly known as:  CATAPRES   Dose:  0.1 mg        Take 0.1 mg by mouth 2 times daily   Refills:  0          STOP taking        Dose Reason for stopping Comments    fish oil-omega-3 fatty acids 1000 MG capsule              MULTIVITAMIN ADULTS 50+ Tabs              thiamine 100 MG tablet                      Prescriptions were sent or printed at these locations (4 Prescriptions)                   Other Prescriptions                Printed at Department/Unit printer (4 of 4)         hydrochlorothiazide (HYDRODIURIL) 25 MG tablet               lisinopril (PRINIVIL/ZESTRIL) 20 MG tablet               metoprolol (LOPRESSOR) 50 MG tablet               aspirin 325 MG EC tablet                Procedures and tests performed during your visit     Alcohol breath test POCT      Orders Needing Specimen Collection     None      Pending Results     No orders found from 12/9/2017 to 12/12/2017.            Pending Culture  "Results     No orders found from 2017 to 2017.            Pending Results Instructions     If you had any lab results that were not finalized at the time of your Discharge, you can call the ED Lab Result RN at [phone]. You will be contacted by this team for any positive Lab results or changes in treatment. The nurses are available 7 days a week from 10A to 6:30P.  You can leave a message 24 hours per day and they will return your call.        Thank you for choosing [facility]       Thank you for choosing [facility] for your care. Our goal is always to provide you with excellent care. Hearing back from our patients is one way we can continue to improve our services. Please take a few minutes to complete the written survey that you may receive in the mail after you visit with us. Thank you!        [employer] Information     [employer] lets you send messages to your doctor, view your test results, renew your prescriptions, schedule appointments and more. To sign up, go to [email or web address] . Click on \"Log in\" on the left side of the screen, which will take you to the Welcome page. Then click on \"Sign up Now\" on the right side of the page.     You will be asked to enter the access code listed below, as well as some personal information. Please follow the directions to create your username and password.     Your access code is: [email or web address]  Expires: 2017 12:25 PM     Your access code will  in 90 days. If you need help or a new code, please call your [facility] clinic or [phone].        Care EveryWhere ID     This is your Care EveryWhere ID. This could be used by other organizations to access your [facility] medical records  [id number]        Equal Access to Services     [facility] : Marnie Cohi, sade hall, janette clay. So [facility] [phone].    ATENCIÓN: Si habla español, tiene a griffith disposición servicios " ness de asistencia lingüística. Kate sharma [phone].    We comply with applicable federal civil rights laws and [place] laws. We do not discriminate on the basis of race, color, national origin, age, disability, sex, sexual orientation, or gender identity.            After Visit Summary       This is your record. Keep this with you and show to your community pharmacist(s) and doctor(s) at your next visit.

## 2017-12-12 NOTE — ED PROVIDER NOTES
"    [facility] EMERGENCY DEPARTMENT ([facility])    12/11/17     ED 12 8:47 PM   History     Chief Complaint   Patient presents with     Alcohol Intoxication     seeking detox from alcohol, drinks \"a lot\" of vodka daily. Last drink ~2 hours PTA.     The history is provided by the patient, a relative and a significant other.     Rashawn Short is a 57 year old male who presents seeking alcohol detox. He has a very longstanding history of alcohol abuse, attempts at sobriety and stays at sober housing. Patient states he has been drinking more heavily over the past 3-4 days. He denies any acute stressors, states he has a relatively stress free life, just tends to drink a lot. Patient accompanied by wife and sister who disagree with his statements. Sister states he got kicked out of sober living. He notes he was kicked out on Saturday night (2 days ago) because he was caught drinking and went to his uncle's place to stay.  He denies history of mental health issues. He states he is stable and grateful, thinks he is OK mentally. Wife states that patient was really drunk yesterday and wanted to come back to their home. Wife states he has been in sober housing and has not lived at home for the past 5 years. She was nervous about this, tried to take him to the hospital yesterday but he refused. Ultimately that was when he went to his uncle's house. Wife enlisted the help of patient's sister and through joint effort they were able to bring him in today. Sister states she has never seen him like this. He has made comments about dying and they noted he was stuttering. He does note withdrawal symptoms of tremors when withdrawing, wife notes he had an alcohol withdrawal seizure in the past as well as bleeding ulcers requiring ICU stay. He has had 8 attempts at detox (including stint at [facility]; sister said he had a very unpleasant experience there) and 10 inpatient programs. No bloody stools.  He is " "on hypertension medication, vitamins, fish oil and aspirin daily.    I have reviewed the Medications, Allergies, Past Medical and Surgical History, and Social History in the [facility] system.  Past Medical History:   Diagnosis Date     Depressive disorder      EtOH dependence (H)      Hypertension      Paroxysmal atrial fibrillation (H) 01/29/2010       Past Surgical History:   Procedure Laterality Date     ORTHOPEDIC SURGERY  1998    ACL Right knee reconstruction-        Family History   Problem Relation Age of Onset     Arrhythmia Father      a-fib     C.A.D. No family hx of      DIABETES No family hx of      Hypertension No family hx of      CEREBROVASCULAR DISEASE No family hx of      Breast Cancer No family hx of      Cancer - colorectal No family hx of      Prostate Cancer No family hx of        Social History   Substance Use Topics     Smoking status: Never Smoker     Smokeless tobacco: Never Used     Alcohol use Yes      Comment: \"a lot\" of alcohol daily        Review of Systems  All other systems negative except as noted in the HPI.     Physical Exam   BP: (!) 178/104  Pulse: 76  Temp: 97.2  F (36.2  C)  Resp: 16  SpO2: 97 %      Physical Exam  Gen: NAD, sitting on stretcher, conversant and pleasant, intoxicated  HEENT: NCAT, PERRL, EOMI, MMM  Neck: trachea midline, supple with FROM  Cardio: normal rate regular rhythm, no R/M/G, normally perfused and warm  Pulm: steady, non-labored respirations, normal WOB, CTAB, no w/r/r  Abd: soft nt/nd, no organomegaly, no CVA tenderness  Ext: normal peripheral pulses, no edema, neurovascularly intact distally.  Skin: no rashes or signs of trauma  Neuro: no focal deficits, 5/5 strength in all ext, poor coordination    ED Course     ED Course     Procedures    Patient assessed in ED 12 at 8:47 PM by Dr. Epstein.     Labs Ordered and Resulted from Time of ED Arrival Up to the Time of Departure from the ED   ALCOHOL BREATH TEST POCT - Abnormal; Notable for the following:        " Result Value    Alcohol Breath Test 0.273 (*)     All other components within normal limits            Assessments & Plan (with Medical Decision Making)   This is a 67-year-old male with a history of hypertension who presents to the emergency department with altered mental status after drinking heavily for the past few days.  He has been staying at his uncles house since he got kicked out of his sober living home.  He has no signs of trauma and no seizure-like activity I have a low suspicion for intracranial hemorrhage infection metabolic derangements or toxicologic etiology of his altered mental status.  His breathalyzer was elevated at 0.273.  I spoke with him and his family and they were all in agreement that he needed to go to detox.  We will able to find in the bed admission and he was ambulating safely and so he was discharged to detox at this time in good condition.    I have reviewed the nursing notes.    I have reviewed the findings, diagnosis, plan and need for follow up with the patient.    Discharge Medication List as of 12/11/2017 10:35 PM          Final diagnoses:   Altered mental status, unspecified altered mental status type   Acute alcoholic intoxication in alcoholism without complication (H)     Amy MONTALVO, am serving as a trained medical scribe to document services personally performed by Tim Epstein MD, based on the provider's statements to me on December 11, 2017.  This document has been checked and approved by the attending provider.    Tim MONTALVO MD, was physically present and have reviewed and verified the accuracy of this note documented by Amy Dickens medical scribe.       12/11/2017   [facility], [place], EMERGENCY DEPARTMENT     Tim Epstein MD  12/12/17 0122

## 2017-12-12 NOTE — ED NOTES
[facility] Need to send 3 days of Rx with patient. Will arrange.

## 2017-12-12 NOTE — ED NOTES
Patient reports drinking 1 pint of Vodka per day. Last drink was 2 hours before he came in. Patient was living in sober living house, [facility], until recently when he was kicked out. Reports seizures with withdrawal. Denies use of Benzodiazepines. Currently experiencing homelessness. Seeking Detox and treatment.

## 2017-12-12 NOTE — DISCHARGE INSTRUCTIONS
Alcohol Addiction  Are you addicted to alcohol?    You may be addicted to alcohol if your drinking harms yourself or others or leads to other problems with your daily life.  The medical term for this is alcohol use disorder. Your healthcare provider may diagnose you with this disorder if you have at least two of the following problems within the span of a year:    You drink alcohol in larger amounts or for a longer period than you intended.    You frequently want to cut down or control alcohol use, or have frequently failed efforts to do so.    You spend a lot of time getting alcohol, using alcohol, or recovering from alcohol use.    You crave or have a strong desire or urge to drink alcohol.    Ongoing alcohol use makes it hard for you to be responsible at work, school, or home.    You continue to use alcohol even though you have had problems in relationships or social settings because of it.    You give up or miss important social, work, or recreational activities because of alcohol use.    You drink alcohol in situations in which it is physically unsafe, such as drinking then driving while intoxicated.    You continue to drink alcohol despite knowing it has caused physical or emotional problems.    You need more and more alcohol to get the same effects.    You hide how much alcohol you drink from family and friends.    You have withdrawal symptoms or use alcohol to avoid withdrawal symptoms.  Date Last Reviewed: 2/1/2017 2000-2017 [employer]. [address] [place] [address], [place] [address]. All rights reserved. This information is not intended as a substitute for professional medical care. Always follow your healthcare professional's instructions.

## 2017-12-14 ENCOUNTER — HOSPITAL ENCOUNTER (OUTPATIENT)
Dept: BEHAVIORAL HEALTH | Facility: CLINIC | Age: 57
Discharge: HOME | End: 2017-12-14
Attending: SOCIAL WORKER | Admitting: SOCIAL WORKER
Payer: COMMERCIAL

## 2017-12-14 VITALS
WEIGHT: 195 LBS | SYSTOLIC BLOOD PRESSURE: 135 MMHG | HEART RATE: 59 BPM | DIASTOLIC BLOOD PRESSURE: 102 MMHG | HEIGHT: 71 IN | BODY MASS INDEX: 27.3 KG/M2

## 2017-12-14 PROCEDURE — H0001 ALCOHOL AND/OR DRUG ASSESS: HCPCS

## 2017-12-14 ASSESSMENT — PAIN SCALES - GENERAL: PAINLEVEL: NO PAIN (0)

## 2017-12-14 ASSESSMENT — ANXIETY QUESTIONNAIRES
7. FEELING AFRAID AS IF SOMETHING AWFUL MIGHT HAPPEN: NOT AT ALL
2. NOT BEING ABLE TO STOP OR CONTROL WORRYING: NOT AT ALL
3. WORRYING TOO MUCH ABOUT DIFFERENT THINGS: NOT AT ALL
4. TROUBLE RELAXING: NOT AT ALL
GAD7 TOTAL SCORE: 1
5. BEING SO RESTLESS THAT IT IS HARD TO SIT STILL: NOT AT ALL
6. BECOMING EASILY ANNOYED OR IRRITABLE: NOT AT ALL
1. FEELING NERVOUS, ANXIOUS, OR ON EDGE: SEVERAL DAYS

## 2017-12-14 ASSESSMENT — PATIENT HEALTH QUESTIONNAIRE - PHQ9: SUM OF ALL RESPONSES TO PHQ QUESTIONS 1-9: 0

## 2017-12-14 NOTE — PROGRESS NOTES
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 relapse/continued use. Patient has untreated co-occurring mental health and substance use issues, which places him at higher risk for relapse.        DIMENSION VI - Recovery Environment   1. Are you employed/attending school? Tell me about that.     **Per 02/13/2017 Rule 25:   Pt works occassionally with [employer]. He has not had a full time job in about 2 years.     2A. Describe a typical day; evening for you. Work, school, social, leisure, volunteer, spiritual practices. Include time spent obtaining, using, recovering from drugs or alcohol. (DSM)     **Per 02/13/2017 Rule 25:   \"I get up, go to [place], go to [employer] and work out. I go to a lot of continuing legal education. I go to some seminars in the afternoon. Go to the library. I don't want to be at my house from 8-5pm. (He usually spends the day) in law meetings, in AA meetings [facility], or at the library reading.  Drinking particularly in the afternoon.\"    2B. How often do you spend more time than you planned using or use more than you planned? (DSM)     **Per 02/13/2017 Rule 25: \"frequently\"    3. How important is using to your social connections? Do many of your family or friends use?     **Per 02/13/2017 " "Rule 25: Friends: They are normal drinkers.     4A. Are you currently in a significant relationship?     Yes  for 30 years.     4C. Sexual Orientation:     Heterosexual    5A. Who do you live with?      Patient implied that he lives with his wife. Per collateral with patient's wife, he was living in a sober home and was kicked out this weekend. She thinks he will have to stay with her now.     5B. Tell me about their alcohol/drug use and mental health issues.     **Per 02/13/2017 Rule 25:  She drinks 4 glasses of wine a year.    5C. Are you concerned for your safety there? No    5D. Are you concerned about the safety of anyone else who lives with you? No    6A. Do you have children who live with you?     No    6B. Do you have children who do not live with you?     **Per 02/13/2017 Rule 25:   Ines 26  Chip-23  Bárbara- 21    7A. Who supports you in making changes in your alcohol or drug use? What are they willing to do to support you? Who is upset or angry about you making changes in your alcohol or drug use? How big a problem is this for you?      **Per 02/13/2017 Rule 25:  \"everybody. My wife, my kids, my mom, my dad, my two sisters, my brother, my friends.\"    7B. This table is provided to record information about the person s relationships and available support It is not necessary to ask each item; only to get a comprehensive picture of their support system.  How often can you count on the following people when you need someone?   Partner / Spouse Usually supportive   Parent(s)/Aunt(s)/Uncle(s)/Grandparents Always supportive   Sibling(s)/Cousin(s) Usually supportive   Child(mayur) Always supportive   Other relative(s) Usually supportive   Friend(s)/neighbor(s) Usually supportive   Child(mayur) s father(s)/mother(s) N/A   Support group member(s) Always supportive   Community of shahnaz members Always supportive   /counselor/therapist/healer N/A   Other (specify) N/A     8A. What is your current " living situation?     Patient implied that he lives with his wife. Per collateral with patient's wife, he was living in a sober home and was kicked out this weekend. She thinks he will have to stay with her now.     8B. What is your long term plan for where you will be living?     A sober house after long-term treatment.     8C. Tell me about your living environment/neighborhood? Ask enough follow up questions to determine safety, criminal activity, availability of alcohol and drugs, supportive or antagonistic to the person making changes.      **Per 02/13/2017 Rule 25:  No concerns    9. Criminal justice history: Gather current/recent history and any significant history related to substance use--Arrests? Convictions? Circumstances? Alcohol or drug involvement? Sentences? Still on probation or parole? Expectations of the court? Current court order? Any sex offenses - lifetime? What level? (DSM)    None    10. What obstacles exist to participating in treatment? (Time off work, childcare, funding, transportation, pending penitentiary time, living situation)     None    Dimension VI Ratings   Recovery environment - The placing authority must use the criteria in Dimension VI to determine a client s recovery environment.   RISK DESCRIPTIONS - Severity rating: 3 Client is not engaged in structured, meaningful activity and the client s peers, family, significant other, and living environment are unsupportive, or there is significant criminal justice system involvement.    REASONS SEVERITY WAS ASSIGNED - (What support does the person have for making changes? What structure/stability does the person have in his or her daily life that will increase the likelihood that changes can be sustained? What problems exist in the person s environment that will jeopardize getting/staying clean and sober?)     Patient implied that he lives with his wife. Per collateral with patient's wife, he was living in a sober home and was discharged this  weekend. She thinks he will have to stay with her now. The patient denied having any concerns regarding his immediate living environment or neighborhood.  The patient reported having relationship conflict with his family due to his ongoing alcohol abuse issues.  The patient identified as being heterosexual and he reported being  for the past 30 years.  The patient denied having a history of legal issues.  The patient reported that most of his use of alcohol had been done alone.  The patient is unemployed and he last worked a formal job a couple of years ago.  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 The patient denied having any increased financial stress.  The patient has a current sober peer support network.         Client Choice/Exceptions   Would you like services specific to language, age, gender, culture, Catholic preference, race, ethnicity, sexual orientation or disability?  No    What particular treatment choices and options would you like to have? [facility], or somewhere in [place], [place]    Do you have a preference for a particular treatment program? See above.     Criteria for Diagnosis     Criteria for Diagnosis  DSM-5 Criteria for Substance Use Disorder  Instructions: Determine whether the client currently meets the criteria for Substance Use Disorder using the diagnostic criteria in the DSM-V pp.481-58. Current means during the most recent 12 months outside a facility that controls access to substances    Category of Substance Severity (ICD-10 Code / DSM 5 Code)     Alcohol Use Disorder Severe  (10.20) (303.90)   Cannabis Use Disorder NA   Hallucinogen Use Disorder NA   Inhalant Use Disorder NA   Opioid Use Disorder NA   Sedative, Hypnotic, or Anxiolytic Use Disorder NA   Stimulant Related Disorder NA   Tobacco Use Disorder NA   Other (or unknown) Substance Use Disorder NA       Collateral Contact Summary   Number of contacts made: 1    Contact with  "referring person:  NA.    If court related records were reviewed, summarize here: NA    Information from collateral contacts supported/largely agreed with information from the client and associated risk ratings.      Rule 25 Assessment Summary and Plan   's Recommendation    It is recommended that patient:  1). Participate in and complete a long-term lodging/residential substance use treatment program at [facility].   2). Follow all subsequent recommendations of the substance use treatment providers.   3). Abstain from alcohol and all mood-altering substances, except as prescribed. Take all medications as prescribed.   4). Attend AA at least 5 times weekly and work with a male sponsor for additional sober supports.   5). Have a mental health evaluation to determine accurate diagnoses and which psychotropic medications would be effective.   6). Discuss with a doctor/psychiatrist the use of Naltrexone, Campral, or Antabuse to decrease cravings and assist with sobriety.   7). Begin weekly individual mental health therapy.      Collateral Contacts     Name:    [facility]   Relationship:       Phone Number:     Releases:         12/11/2017 - \"Rashawn Short is a 57 year old male who presents seeking alcohol detox. He has a very longstanding history of alcohol abuse, attempts at sobriety and stays at sober housing. Patient states he has been drinking more heavily over the past 3-4 days. He denies any acute stressors, states he has a relatively stress free life, just tends to drink a lot. Patient accompanied by wife and sister who disagree with his statements. Sister states he got kicked out of sober living. He notes he was kicked out on Saturday night (2 days ago) because he was caught drinking and went to his uncle's place to stay.  He denies history of mental health issues. He states he is stable and grateful, thinks he is OK mentally. Wife states that patient was really drunk " "yesterday and wanted to come back to their home. Wife states he has been in sober housing and has not lived at home for the past 5 years. She was nervous about this, tried to take him to the hospital yesterday but he refused. Ultimately that was when he went to his uncle's house. Wife enlisted the help of patient's sister and through joint effort they were able to bring him in today. Sister states she has never seen him like this. He has made comments about dying and they noted he was stuttering. He does note withdrawal symptoms of tremors when withdrawing, wife notes he had an alcohol withdrawal seizure in the past as well as bleeding ulcers requiring ICU stay. He has had 8 attempts at detox (including stint at [address] [place] [address] Detox; sister said he had a very unpleasant experience there) and 10 inpatient programs. No bloody stools.  He is on hypertension medication, vitamins, fish oil and aspirin daily.\"      Collateral Contacts     Name:    Ora Short   Relationship:    wife   Phone Number:    [phone]   Releases:    Yes     On 12/14/2017, Therapist spoke with Ms. Short. She reported that patient binged over the weekend and was kicked out of his sober house. She is unsure if he had drank other times in the sober house. She would like him to be in a 30 day program as soon as possible and then make a long-term plan. She is worried about him being on his own and drinking. He will have to return to her residence. Patient needs counseling and a hands-on, structured sober house.   ollateral Contacts      A problematic pattern of alcohol/drug use leading to clinically significant impairment or distress, as manifested by at least two of the following, occurring within a 12-month period:  10/11    Alcohol/drug is often taken in larger amounts or over a longer period than was intended.  There is a persistent desire or unsuccessful efforts to cut down or control alcohol/drug use  A great deal of time is " spent in activities necessary to obtain alcohol, use alcohol, or recover from its effects.  Craving, or a strong desire or urge to use alcohol/drug  Recurrent alcohol/drug use resulting in a failure to fulfill major role obligations at work, school or home.  Continued alcohol use despite having persistent or recurrent social or interpersonal problems caused or exacerbated by the effects of alcohol/drug.  Important social, occupational, or recreational activities are given up or reduced because of alcohol/drug use.  Alcohol/drug use is continued despite knowledge of having a persistent or recurrent physical or psychological problem that is likely to have been caused or exacerbated by alcohol.  Tolerance, as defined by either of the following: A need for markedly increased amounts of alcohol/drug to achieve intoxication or desired effect.  Withdrawal, as manifested by either of the following: The characteristic withdrawal syndrome for alcohol/drug (refer to Criteria A and B of the criteria set for alcohol/drug withdrawal).      Specify if: In early remission:  After full criteria for alcohol/drug use disorder were previously met, none of the criteria for alcohol/drug use disorder have been met for at least 3 months but for less than 12 months (with the exception that Criterion A4,  Craving or a strong desire or urge to use alcohol/drug  may be met).     In sustained remission:   After full criteria for alcohol use disorder were previously met, non of the criteria for alcohol/drug use disorder have been met at any time during a period of 12 months or longer (with the exception that Criterion A4,  Craving or strong desire or urge to use alcohol/drug  may be met).   Specify if:   This additional specifier is used if the individual is in an environment where access to alcohol is restricted.    Mild: Presence of 2-3 symptoms  Moderate: Presence of 4-5 symptoms  Severe: Presence of 6 or more symptoms

## 2017-12-14 NOTE — PROGRESS NOTES
"CHEMICAL DEPENDENCY ASSESSMENT SUMMARY    PATIENT NAME: Rashawn Short  MEDICAL RECORD NUMBER: [id number]  PATIENT ADDRESS: [address] [place] [address] [place] [address]  HOME TELEPHONE NUMBER: [phone] (home)   STATISTICS: YOB: 1960     Age: 57 year old     Gender: male    RELATIONSHIP STATUS:      DATE OF ASSESSMENT: 2017  EVALUATION COUNSELOR: Marcie Coburn    REFERRAL SOURCE: Self/Family    REASON FOR EVALUATION:     On 2017, Mr. Rashawn Short presented to [facility] for a Substance Use Evaluation. He stated he wants a 90-day program because he has continued to drink. He reported on 10/10/2017, the day of his daughter's birthday and his aunt's , he bought vodka and \"the more I drank the more I wanted more. It didn't matter there was a .\" He missed the . He initially stated he hasn't drank since. Per [facility] record, patient last drank on 2017 and was admitted to [facility] detox and discharged yesterday.     HEALTH HISTORY AND MEDICATIONS:     Patient denies having any chronic biomedical conditions that would interfere with treatment or any recovery skills training/workshop. Pt reports having high blood pressure. Pt reports taking metoprolol, lisinopril, and hydroxyzine. At the time of the CD evaluation the patient's BP was 135/102 and Pulse was 59 BPM. Pt denies having pain at this time and reports his pain level is a 0 on the 0-10 Pain Rating Scale. Pt denies having any consumption of nicotine products at this time.    HISTORY OF PREVIOUS TREATMENT AND COUNSELING:     Patient has 9 previous treatment     HISTORY OF ALCOHOL AND DRUG USE:     **Per 2017 Rule 25:   \"[age] y/o: no concerns with alcohol. He was just drinking beer.  2011: first time he tried vodka. Began drinking daily very quickly after he started drinking vodka.  : 1.5 quart of vodka per day.  2013-2016: sober  2016: drinking 5 days a week, " "3-4 drinks per day because he lived in a sober house.  Left the sober house 8 days ago.  He has drank the last couple of days.\"     03/2017 - he completed [facility]. From mid-03/2017 to 07/28/2017, he lived in a sober house and did not drink. From end of 07/2017 to 09/01/2017, he had \"a couple of slips.\" On 10/10/2017 - he drank a lot. Between 10/2017 and 12/09/17, he drank \"very little.\" On 12/09/2017, he stated he drank a pint of vodka.      SUMMARY OF SUBSTANCE USE DISORDER SYMPTOMS ACKNOWLEDGED BY THE PATIENT: The patient identified positively with 10 of the 11 DSM-5 criteria for a primary diagnostic impression of substance use disorder severe.     SUMMARY OF COLLATERAL DATA:    On 12/14/2017, Therapist spoke with Ms. Short. She reported that patient binged over the weekend and was kicked out of his sober house. She is unsure if he had drank other times in the sober house. She would like him to be in a 30 day program as soon as possible and then make a long-term plan. She is worried about him being on his own and drinking. He will have to return to her residence. Patient needs counseling and a hands-on, structured sober house.     IMPRESSION:    303.90 (F10.20) Alcohol Use Disorder Severe  RULE OUT: Depression and Anxiety     [facility] PLACEMENT CRITERIA:    DIMENSION 1: Intoxication and Withdrawal:  The patient scored a 1.    Patient reports that his last use of alcohol was on 12/09/2017. He was in detox until yesterday. Patient displays no intoxication or withdrawal symptomology at this time. Pt denies having any feelings of withdrawal.  Pt was given a breathalyzer during his evaluation and patient's GERBER was 0.00.    DIMENSION 2: Biomedical Conditions:  The patient scored a 1.    See health history above     DIMENSION 3: Emotional and Behavioral:  The patient scored a 2.    Patient denies having any formal MH diagnoses and denies taking any medications for MH.  He stated he has never been diagnosed with " "depression, but \"I have been called anxious.\" Pt described his childhood as \"great.\"   Pt denies a history of abuse.  At the time of the assessment, pt's PHQ-9 score was 0 (minimal depression) and his SERGIO-7 score was 1 (minimal anxiety). Patient appears to be minimizing his mental health symptoms and its impact. Pt has minimal emotional and stress management skills. Pt denies SIB, SA, suicidal thoughts at this time.     DIMENSION 4: Readiness to Change:  The patient scored a 3.    Patient admits he has a problem with alcohol and wants to stop drinking for good. Patient presented teary and ashamed for returning for another evaluation. He did not mention that he was discharged from his sober home recently. During the evaluation, he talked about his accomplishments and minimized the extent of his drinking. He appears to minimize mental health concerns as well.     DIMENSION 5: Relapse Potential:  The patient scored a 4.    Patient has attended and completed 9 CD treatments between 2011 and 2017.  Pt has actively attended AA meetings. Pt has minimal impulse control along with sober coping skills. Pt has minimal insight into the effects his use has had on his physical and mental health. Patient is at a high risk for relapse/continued use. Patient has untreated co-occurring mental health and substance use issues, which places him at higher risk for relapse.     DIMENSION 6: Recovery Environment:  The patient scored a 3.    Patient implied that he lives with his wife. Per collateral with patient's wife, he was living in a sober home and was discharged this weekend. She thinks he will have to stay with her now. The patient denied having any concerns regarding his immediate living environment or neighborhood.  The patient reported having relationship conflict with his family due to his ongoing alcohol abuse issues.  The patient identified as being heterosexual and he reported being  for the past 30 years.  The patient " denied having a history of legal issues.  The patient reported that most of his use of alcohol had been done alone.  The patient is unemployed and he last worked a formal job a couple of years ago.  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 The patient denied having any increased financial stress.  The patient has a current sober peer support network.    RECOMMENDATIONS:    It is recommended that patient:  1). Participate in and complete a long-term lodging/residential substance use treatment program at [facility].   2). Follow all subsequent recommendations of the substance use treatment providers.   3). Abstain from alcohol and all mood-altering substances, except as prescribed. Take all medications as prescribed.   4). Attend AA at least 5 times weekly and work with a male sponsor for additional sober supports.   5). Have a mental health evaluation to determine accurate diagnoses and which psychotropic medications would be effective.   6). Discuss with a doctor/psychiatrist the use of Naltrexone, Campral, or Antabuse to decrease cravings and assist with sobriety.   7). Begin weekly individual mental health therapy.    INITIAL PROBLEM LIST:    The patient has unstable housing  The patient lacks relapse prevention skills  The patient has poor coping skills  The patient has poor refusal skills   The patient lacks the ability to effectively manage his/her mental health issues  The patient has a significant history of grief and loss issues  The patient has a significant history of guilt and shame issues        This information has been disclosed to you from records protected by Federal confidentiality rules (42 CFR part 2). The Federal rules prohibit you from making any further disclosure of this information unless further disclosure is expressly permitted by the written consent of the person to whom it pertains or as otherwise permitted by 42 CFR part 2. A general  authorization for the release of medical or other information is NOT sufficient for this purpose. The Federal rules restrict any use of the information to criminally investigate or prosecute any alcohol or drug abuse patient.

## 2017-12-14 NOTE — ADDENDUM NOTE
Encounter addended by: Marcie Coburn LADC on: 12/14/2017 12:44 PM<BR>     Actions taken: Pend clinical note, Delete clinical note

## 2017-12-14 NOTE — PROGRESS NOTES
"[facility]  [address] [place] [id number]               ADULT CD ASSESSMENT      Additional Clinical Questions - Outpatient    Patient Name: Rashawn Short  Cell Phone:   [phone]    Email:  [email or web address]  Emergency Contact: Ora Short (wife) Tel: [phone]    ________________________________________________________________________    The patient is      With which race do you identify? White    Please list your family members and if they are living or , i.e. (grandparents, parents, step-parents, adoptive parents, number of siblings, half-siblings, etc.)     Mother   Living Father Living   No Step-mother   NA No Step-father NA   Maternal Grandmother    Fraternal Grandmother    Maternal Grandfather     Fraternal Grandfather    2 Sister(s) Living 1 Brother(s)   Living   No Half-sister(s)   NA No Half-brother(s) NA             Who raised you? (parents, grandparents, adoptive parents, step-parents, etc.)    Both Parents    Have any of your family members or significant others had problems with mental illness or substance abuse?  Please explain.    no    Do you have any children or Stepchildren? Yes, please explain: 3 adults    Are you being investigated by Child Protection Services? No    Do you have a child protection worker, probation office or ? No    How would you describe your current finances?  Just making it    If you are having problems, (unpaid bills, bankruptcy, IRS problems) please explain:  No    If working or a student are you able to function appropriately in that setting? Yes    Describe your preferred learning style:  by hands-on practice    What personal strengths do you have that can help you get sober?  \"Discipline, determination, fear of not failing again, others are proud of me for strining together sobriety.\"     **Per 2017 Rule 25:  \"God, family, self-worth, self-esteem.\"    Do you " currently self-administer your medications?  Yes    Have you ever:    Had to lie to people important to you about how much you cuellar?     No     Felt the need to bet more and more money?      No     Attempted treatment for a gambling problem?        No     Touched or fondled someone else inappropriately, or forced them to have sex with you against their will?       No     Are you or have you ever been a registered sex offender?        No     Is there any history of sexual abuse in your family?        No     Fabens obsessed by your sexual behavior (having sex with many partners, masturbating often, using pornography often?        No     Received therapy or stayed in the hospital for mental health problems?        No     Hurt yourself (cutting, burning or hitting yourself)?        No     Purged, binged or restricted yourself as a way to control your weight?      No       Are you on a special diet?       No       Do you have any concerns regarding your nutritional status?        No       Have you had any appetite changes in the last 3 months?        No       Have you had any weight loss or weight gain in the last 3 months?  If yes, how much gain or loss:     If weight patient gains more than 10 lbs or loses more than 10 lbs, refer to program RN /  Attending Physician for assessment.    No        Was the patient informed of BMI?      Above,  General nutrition education   Yes     Do you have any dental problems?        No     Lived through any trauma or stressful events?        Yes, If yes explain: Ora's father passed away 9 years ago.     In the past month, have you had any of the following symptoms related to the trauma listed above? (Dreams, intense memories, flashbacks, physical reactions, etc.)         No     Believed that people are spying on you, or that someone was plotting against you or trying to hurt you?       No     Believed that someone was reading your mind or could hear your thoughts or that you could  actually read someone's mind, hear what another person was thinking?       No     Believed that someone or some force outside of yourself put thoughts in your mind that were not your own, or made you act in a way that was not your usual self?  Or have you ever thought you were possessed?         No     Believed that you were being sent special messages through the TV, radio or newspaper?         No     [place] things other people couldn't hear, such as voices?         No     Had visions when you were awake?  Or have you ever seen things other people couldn't see?       No         Suicide Screening Questions:    1. Are you feeling hopeless about the present/future?   No   2. Have you ever had thoughts about taking your life?   No   3. When did you have these thoughts? NA   4. Do you have any current intent or active desire to take your life?   No   5. Do you have a plan to take your life?    No   6. Have you ever made a suicide attempt?   No   7. Do you have access to pills, guns or other methods to kill yourself?   No       Risk Status - Use as Guide/Example    Ideation - Active  Thoughts of suicide Intent to follow  Through on suicide Plan for completing  suicide    Yes No Yes No Yes No   Emergent X  X  X    Urgent / Non-Emergent X  X   X   Non-Urgent X   X  X   No Current / Active Risk (Past 6 Months)  X  X  X   Rashawn Short No No No       Additional Risk Factors: No addtional risk factors   Protective Factors:  Having people in the his/her life who would prevent the patient from considering comminting suicide (i.e. young children, spouse, parents, etc.)  An absense of mental health issues or stable and well treated mental health issues  Having easy access to supportive family members  Having a good community support network  Having cultural, Mosque or spiritual beliefs that discourage suicide     Risk Status:    Emergent? No  Urgent / Non-Emergent?  No  Present / Non- Urgent? No      No Current Risk? Yes,  "Evaluation Counselors - Document in Epic / SBAR to counselor \"No identified risk\" and Treatment Counselors - Assess weekly in progress notes under Dimension 3 and summarize in Discharge / Treatment summary under Dimension 3.    Additional information to support suicide risk rating: See Above    Mental Status Assessment    Physical Appearance/Attire:  Appears stated age  Hygiene:  well groomed  Eye Contact:  at examiner  Speech:  regular  Speech Volume:  regular  Speech Quality: fluid  Cognitive/Perceptual:  reality based  Cognition:  memory intact   Judgment:  intact  Insight:  intact  Orientation:  time, place, person and situation  Thought:  logical   Hallucinations:  none  General Behavioral Tone:  cooperative  Psychomotor Activity:  no problem noted  Gait:  no problem  Mood:  Depressed, anxious, tearful  Affect:  congruence/appropriate    Counselor Notes: NA    Criteria for Diagnosis  DSM-5 Criteria for Substance Abuse    303.90 (F10.20) Alcohol Use Disorder Severe  RULE OUT: Depression and Anxiety     LEVEL OF CARE    Intoxication and Withdrawal: 1  Biomedical:  1  Emotional and Behavioral:  2  Readiness to Change:  3  Relapse Potential: 4  Recovery Environmental:  3    Initial problem list:    The patient has unstable housing  The patient lacks relapse prevention skills  The patient has poor coping skills  The patient has poor refusal skills   The patient lacks the ability to effectively manage his/her mental health issues  The patient has a significant history of grief and loss issues  The patient has a significant history of guilt and shame issues    Patient/Client is willing to follow treatment recommendations.  Yes    SAYDA Cote     Vulnerable Adult Checklist for LODGING:     This LODGING patient, or other Residential/Lodging CD Treatment patient is a categorical Vulnerable Adult according to [place] Statute 626.5572 subdivision 21.    Susceptibility to abuse by others     1.  Have you ever been " emotionally abused by anyone?          No    2.  Have you ever been bullied, or physically assaulted by anyone?        No    3.  Have you ever been sexually taken advantage of or sexually assaulted?        No    4.  Have you ever been financially taken advantage of?        No    5.  Have you ever hurt yourself intentionally such as burns or cuts?       No    Risk of abusing other vulnerable adults     1.  Have you ever bullied, berated or emotionally degraded someone else?       No    2.  Have you ever financially taken advantage of someone else?       No    3.  Have you ever sexually exploited or assaulted another person?       No    4.  Have you ever gotten into fights, verbal arguments or physically assaulted someone?          No    Based on the above information:    This [facility] patient, or other Residential/Lodging CD Treatment patient is a categorical Vulnerable Adult according to [facility] Statue 626.5572 subdivision 21.

## 2017-12-15 ENCOUNTER — CARE COORDINATION (OUTPATIENT)
Dept: CARE COORDINATION | Facility: CLINIC | Age: 57
End: 2017-12-15

## 2017-12-15 ASSESSMENT — ANXIETY QUESTIONNAIRES: GAD7 TOTAL SCORE: 1

## 2017-12-15 NOTE — PROGRESS NOTES
"Clinic Care Coordination Contact  OUTREACH    Referral Information:  Referral Source: ED Follow-Up- was in the ED for detox. Wife and sister took him in, as he was drinking \" a lot of vodka\" a day. Call to patient and explained who caller is and purpose of the call.   Reason for Contact:   Care Conference: No     Universal Utilization:   ED Visits in last year: 2  Hospital visits in last year: 0  Last PCP appointment: 09/15/17  Missed Appointments: 0  Concerns: appropriate utilization       Clinical Concerns:  Current Medical Concerns: Is detoxed.      Current Behavioral Concerns: Waiting to go into treatment at [facility] on Monday. Had assessment yesterday.  He lived in this sober house for 2 years before and hopes he can maintain his sobriety there.  He is disappointed with himself for hurting his wife and kids with his drinking.      Education Provided to patient: none provided.   Clinical Pathway Name: None    Medication Management:  Not addressed. Patient did not want to talk long.     Functional Status:  Mobility Status: Independent     Transportation: not assessed     Psychosocial:  Current living arrangement:: I live in a private home with family. He had been living in a sober house until his relapse this fall.   Financial/Insurance: [employer] of [place]. No concerns raised.      Resources and Interventions:  Current Resources:        Advanced Care Plans/Directives on file:: No    Barriers: Multiple relapses from sobriety.  Family strain.  Strengths: Wants to become sober again and repair relationships with kids and wife.   Patient/Caregiver understanding: He will go to sober housing and will set up appt to see Dr. Carrillo at [facility].   Frequency of Care Coordination: IIn one week to assess if he has gotten into treatment.   Upcoming appointment:  (not set up)     Plan: CC to call in one week. Patient to enter treatment.      Yamileth Collazo,   [facility]  [email or web address]  [phone]

## 2017-12-22 NOTE — PROGRESS NOTES
Clinic Care Coordination Contact  [facility]/Voicemail    Referral Source: ED Follow-Up  Clinical Data: Care Coordinator Outreach  Outreach attempted x 1.  Left message on voicemail with call back information and requested return call.  Plan: Care Coordinator will try to reach patient again in 3-5 business days.  Yamileth Collazo,   [facility]  [email or web address]  [phone]

## 2017-12-26 NOTE — PROGRESS NOTES
Clinic Care Coordination Contact    Situation: Patient chart reviewed by care coordinator.  Call back from patient.     Background: Patient has struggled throughout life with alcohol addiction. Recent binge drinking and went to ED. Was trying to get back into treatment.     Assessment: Call from patient and he entered into long term inpatient treatment on 12-18 at [facility]. He is very grateful to be back sober since 12-17-17. He hopes that will be his permanent sober date.  He likes this program as it is a Baptism based program and he expects to be there for 5-7 months.     Plan/Recommendations: No ongoing CC concerns. He is grateful for the support he has gotten in the past from Dr. Carrillo.      No further outreach by this CC at this time.  Yamileth Collazo,   [facility]  [email or web address]  [phone]

## 2018-02-09 NOTE — PROGRESS NOTES
Health Maintenance Summary     Topic Due On Due Status Completed On    Pap Smear - Cervical Cancer Screening  Dec 22, 2010 Overdue     Immunization - TDAP Pregnancy  Hidden     IMMUNIZATION - DTaP/Tdap/Td Dec 22, 1999 Overdue     Immunization-Influenza Sep 1, 2017 Overdue     Depression Screening Dec 22, 1992 Overdue           Patient is up to date, no discussion needed .             [facility] Health Assessment    Patient Name:  Rashawn Short  Date of review: 2/13/2017  Vital signs: BP: 164/113   Pulse: 101   Temp: 97.6  *Patient had not taken AM BP medications. Denies symptoms of high BP. Patient to monitor blood pressure daily to assure adequate control. Patient states he will recheck BP after primary group today.     Direct admission    Counselor: Tracey Harry   Drug of Choice: Alcohol  Last use: 2/11/2017  Home clinic/MD: [facility]   Psychiatrist/therapist: None    Medical history/current conditions: Hypertension, recent stomach ulcers (D/C from hospital on 2/1/17)    Mental Health diagnosis: None  Medication compliant?: Yes  Recent sucidal thoughts? None     When? N/A  Current thought of self-harm? none    Plan? N/A  Pt. Self rating of impulsiveness? (1-10 scale): 3    Pain assessment:   Pt. Experiencing pain at this time? No  Rating on 0-10 scale: (1-10 scale): None  Location:  None  Result of: N/A  L P pain management strategy: OTC medications  On-going nursing intervention required?   No

## 2018-06-27 ENCOUNTER — OFFICE VISIT (OUTPATIENT)
Dept: FAMILY MEDICINE | Facility: CLINIC | Age: 58
End: 2018-06-27
Payer: COMMERCIAL

## 2018-06-27 VITALS
TEMPERATURE: 97.7 F | WEIGHT: 214.8 LBS | SYSTOLIC BLOOD PRESSURE: 128 MMHG | DIASTOLIC BLOOD PRESSURE: 90 MMHG | HEART RATE: 102 BPM | OXYGEN SATURATION: 98 % | BODY MASS INDEX: 29.96 KG/M2

## 2018-06-27 DIAGNOSIS — I10 HYPERTENSION GOAL BP (BLOOD PRESSURE) < 140/90: Primary | ICD-10-CM

## 2018-06-27 RX ORDER — METOPROLOL TARTRATE 50 MG
50 TABLET ORAL 2 TIMES DAILY
Qty: 60 TABLET | Refills: 0 | Status: SHIPPED | OUTPATIENT
Start: 2018-06-27 | End: 2020-12-28

## 2018-06-27 RX ORDER — MULTIVITAMIN,THERAPEUTIC
1 TABLET ORAL DAILY
COMMUNITY

## 2018-06-27 NOTE — PATIENT INSTRUCTIONS
Here is the plan from today's visit    1. Hypertension goal BP (blood pressure) < 140/90  - metoprolol tartrate (LOPRESSOR) 50 MG tablet; Take 1 tablet (50 mg) by mouth 2 times daily  Dispense: 60 tablet; Refill: 0  - F/u with PCP on 7/25/18 as scheduled for review of anti-hypertensive medications      Please call or return to clinic if your symptoms don't go away.    Follow up plan  Please make a clinic appointment for follow up with your primary physician Ludwig Carrillo MD in 1 months.     Thank you for coming to [facility] today.  Lab Testing:  **If you had lab testing today and your results are reassuring or normal they will be mailed to you or sent through [employer] within 7 days.   **If the lab tests need quick action we will call you with the results.  The phone number we will call with results is # [phone] (home) . If this is not the best number please call our clinic and change the number.  Medication Refills:  If you need any refills please call your pharmacy and they will contact us.   If you need to  your refill at a new pharmacy, please contact the new pharmacy directly. The new pharmacy will help you get your medications transferred faster.   Scheduling:  If you have any concerns about today's visit or wish to schedule another appointment please call our office during normal business hours [phone] (8-5:00 M-F)  If a referral was made to a [facility] and you don't get a call from central scheduling please call [phone].  If a Mammogram was ordered for you at [facility] call [phone] to schedule or change your appointment.  If you had an XRay/CT/Ultrasound/MRI ordered the number is [phone] to schedule or change your radiology appointment.   Medical Concerns:  If you have urgent medical concerns please call [phone] at any time of the day.    Lu Ricci MD

## 2018-06-27 NOTE — PROGRESS NOTES
HPI:       Rashawn Short is a 58 year old who presents for the following  Patient presents with:  RECHECK: High blood pressure    HTN:   Patient is a new patient presenting to [facility] today as directed from [place] chemical dependence.  He is a relatively healthy 58-year-old gentleman who has a long history of alcohol dependence who is now admitted to [facility] for alcohol cessation.  He has a history of difficult to control hypertension that required 3 antihypertensive medications which included metoprolol, lisinopril, hydrochlorothiazide, and clonidine.  He ran out of his medications and was unable to refill for the last 4 weeks while in [place].  His blood pressures have remained stable with ranges in the 135-140s systolic and diastolics in the 95-100s.  He has been asymptomatic despite being off medications for 4 weeks.  From [place] he was recommended to follow up with [facility] in the meantime while awaiting his primary care physician follow-up which is already scheduled for July 25, 2018.  In the clinic, he was noted to be normotensive mild elevation in diastolics to 103.  He was noted to have mild tachycardia to 102 however during physical exam he did not show any evidence of atrial fibrillation.  He appeared to be in normal sinus rhythm.    Long discussion was marked with the patient and the plan was to only start metoprolol as his blood pressure was well-controlled.  Likely reason for his poorly controlled but pressure in the past was alcohol dependence.  Now that he has been sober for 6 months he will be very well controlled with likely 1 or 2 agents at max.  He is planning to follow-up with his PCP in 1 month and subsequent recommendations will come from Dr. cardozo.    Problem, Medication and Allergy Lists were   reviewed and are current.     Patient Active Problem List    Diagnosis Date Noted     Chemical dependency (H) 02/15/2017     Priority: Medium     Alcohol dependence (H) 01/02/2017  "    Priority: Medium     Hypertension goal BP (blood pressure) < 140/90 05/07/2013     Priority: Medium     EtOH dependence (H) 04/24/2013     Priority: Medium     Paroxysmal atrial fibrillation (H) 01/29/2010     Priority: Medium   ,     Current Outpatient Prescriptions   Medication Sig Dispense Refill     aspirin 325 MG EC tablet Take 1 tablet (325 mg) by mouth daily 3 tablet 0     cloNIDine (CATAPRES) 0.1 MG tablet Take 0.1 mg by mouth 2 times daily       ESCITALOPRAM OXALATE PO Take 10 mg by mouth daily       Fish Oil-Cholecalciferol (FISH OIL + D3) 6015-4372 MG-UNIT CAPS        hydrochlorothiazide (HYDRODIURIL) 25 MG tablet Take 1 tablet (25 mg) by mouth daily 3 tablet 0     lisinopril (PRINIVIL/ZESTRIL) 20 MG tablet Take 1 tablet (20 mg) by mouth daily 3 tablet 0     metoprolol (LOPRESSOR) 50 MG tablet Take 1 tablet (50 mg) by mouth 2 times daily 6 tablet 0     multivitamin, therapeutic (THERA-VIT) TABS tablet Take 1 tablet by mouth daily       Pyridoxine HCl (VITAMIN B6 PO) Take 100 mg by mouth daily     ,   No Known Allergies  Patient is   an established patient of this clinic.,   Past Medical History:   Diagnosis Date     Depressive disorder      EtOH dependence (H)      Hypertension      Paroxysmal atrial fibrillation (H) 01/29/2010   ,   Family History     Problem (# of Occurrences) Relation (Name,Age of Onset)    Arrhythmia (1) Father: a-fib       Negative family history of: C.A.D., Diabetes, Hypertension, Cerebrovascular Disease, Breast Cancer, Cancer - colorectal, Prostate Cancer       and   Social History     Social History     Marital status:      Spouse name: N/A     Number of children: N/A     Years of education: N/A     Social History Main Topics     Smoking status: Never Smoker     Smokeless tobacco: Never Used     Alcohol use Yes      Comment: \"a lot\" of alcohol daily     Drug use: No     Sexual activity: Yes     Partners: Female     Other Topics Concern     None     Social History " Narrative            Review of Systems:   Review of Systems   Skin: negative except as above  Respiratory: negative except as above  Cardiovascular: negative except as above  Gastrointestinal: negative except as above  Genitourinary: negative except as above         Physical Exam:   Patient Vitals for the past 24 hrs:   BP Temp Temp src Pulse SpO2 Weight   06/27/18 1505 128/90 - - - - -   06/27/18 1504 (!) 141/103 97.7  F (36.5  C) Oral 102 98 % 214 lb 12.8 oz (97.4 kg)     Body mass index is 29.96 kg/(m^2).  Vital signs normal except HTN and mild tachycardia     Physical Exam  Constitutional: Oriented to person, place, and time. Appears well-developed and well-nourished.   HENT:   Head: Normocephalic and atraumatic.   Eyes: Conjunctivae are normal.   Neck: Normal range of motion.   Cardiovascular: Normal rate, regular rhythm, normal heart sounds and intact distal pulses.    Pulmonary/Chest: Effort normal and breath sounds normal. No respiratory distress. No wheezes.   Abdominal: Soft. Exhibits no distension. There is no tenderness.   Musculoskeletal: Normal range of motion.   Neurological: Alert and oriented to person, place, and time.   Skin: Skin is warm and dry.   Psychiatric: Has a normal mood and affect. Behavior is normal.       Results:      Results from the last 24 hoursNo results found for this or any previous visit (from the past 24 hour(s)).  Assessment and Plan       1. Hypertension goal BP (blood pressure) < 140/90  Blood pressure appears to be well controlled now that he has been sober from alcohol dependence for 6-7 months.  He has not required any antihypertensive medications for the last 4 weeks has remained normotensive with mild elevation systolic blood pressures.  - metoprolol tartrate (LOPRESSOR) 50 MG tablet; Take 1 tablet (50 mg) by mouth 2 times daily  Dispense: 60 tablet; Refill: 0  - F/u with PCP on 7/25/18 as scheduled for review of anti-hypertensive medications      Please call or return  to clinic if your symptoms don't go away.    Follow up plan  Please make a clinic appointment for follow up with your primary physician Ludwig Carrillo MD in 1 months.     Thank you for coming to [facility] today.    There are no discontinued medications.  Options for treatment and follow-up care were reviewed with the patient. Rashawn Short  engaged in the decision making process and verbalized understanding of the options discussed and agreed with the final plan.    Lu Ricci MD

## 2018-06-27 NOTE — PROGRESS NOTES
Preceptor Attestation:   Patient seen, evaluated and discussed with the resident.   I have verified the content of the note, which accurately reflects my assessment of the patient and the plan of care.   Supervising Physician:  Shahid Cortez MD

## 2018-06-27 NOTE — MR AVS SNAPSHOT
After Visit Summary   6/27/2018    Rashawn Short    MRN: [id number]           Patient Information     Date Of Birth          1960        Visit Information        Provider Department      6/27/2018 3:00 PM Lu Ricci MD [facility]        Today's Diagnoses     Hypertension goal BP (blood pressure) < 140/90    -  1      Care Instructions    Here is the plan from today's visit    1. Hypertension goal BP (blood pressure) < 140/90  - metoprolol tartrate (LOPRESSOR) 50 MG tablet; Take 1 tablet (50 mg) by mouth 2 times daily  Dispense: 60 tablet; Refill: 0  - F/u with PCP on 7/25/18 as scheduled for review of anti-hypertensive medications      Please call or return to clinic if your symptoms don't go away.    Follow up plan  Please make a clinic appointment for follow up with your primary physician Ludwig Carrillo MD in 1 months.     Thank you for coming to [facility] today.  Lab Testing:  **If you had lab testing today and your results are reassuring or normal they will be mailed to you or sent through [employer] within 7 days.   **If the lab tests need quick action we will call you with the results.  The phone number we will call with results is # [phone] (home) . If this is not the best number please call our clinic and change the number.  Medication Refills:  If you need any refills please call your pharmacy and they will contact us.   If you need to  your refill at a new pharmacy, please contact the new pharmacy directly. The new pharmacy will help you get your medications transferred faster.   Scheduling:  If you have any concerns about today's visit or wish to schedule another appointment please call our office during normal business hours [phone] (8-5:00 M-F)  If a referral was made to a [facility] and you don't get a call from central scheduling please call [phone].  If a Mammogram was ordered for you at [facility] call [phone] to schedule or change your appointment.  If you had an XRay/CT/Ultrasound/MRI ordered the number is [phone] to schedule or change your radiology appointment.   Medical Concerns:  If you have urgent medical concerns please call [phone] at any time of the day.    Lu Ricci MD            Follow-ups after your visit        Who to contact     Please call your clinic at [phone] to:    Ask questions about your health    Make or cancel appointments    Discuss your medicines    Learn about your test results    Speak to your doctor            Additional Information About Your Visit        [employer] Information     [employer] is an electronic gateway that provides easy, online access to your medical records. With [employer], you can request a clinic appointment, read your test results, renew a prescription or communicate with your care team.     To sign up for [employer] visit the website at [email or web address]   You will be asked to enter the access code listed below, as well as some personal information. Please follow the directions to create your username and password.     Your access code is: [email or web address]  Expires: 2018  2:50 PM     Your access code will  in 90 days. If you need help or a new code, please contact your [facility] or call [phone] for assistance.        Care EveryWhere ID     This is your Care EveryWhere ID. This could be used by other organizations to access your [place] medical records  [id number]        Your Vitals Were     Pulse Temperature Pulse Oximetry BMI (Body Mass Index)          102 97.7  F (36.5  C) (Oral) 98% 29.96 kg/m2         Blood Pressure from Last 3 Encounters:   18 128/90   17 (!) 163/101   10/10/17 125/67    Weight from Last 3 Encounters:   18 214 lb 12.8 oz (97.4 kg)   10/10/17 200 lb (90.7 kg)   17 206 lb 12.8 oz (93.8 kg)              Today, you had the following     No  orders found for display         Where to get your medicines      These medications were sent to [facility] [place], [place] - [address] , [facility] [address]     Phone:  [phone]     metoprolol tartrate 50 MG tablet          Primary Care Provider Office Phone # Fax #    Ludwig Carrillo [phone]       [facility] [address]  [place] [address]        Equal Access to Services     MARIANNE ANN : Hadii aad ku hadasho Soomaali, waaxda luqadaha, qaybta kaalmada adeegyada, waxay idiin hayaan adeeg kharaanabela lajael . [facility] [phone].    ATENCIÓN: Si habla español, tiene a griffith disposición servicios gratuitos de asistencia lingüística. [facility] [phone].    We comply with applicable federal civil rights laws and [place] laws. We do not discriminate on the basis of race, color, national origin, age, disability, sex, sexual orientation, or gender identity.            Thank you!     Thank you for choosing [facility]  for your care. Our goal is always to provide you with excellent care. Hearing back from our patients is one way we can continue to improve our services. Please take a few minutes to complete the written survey that you may receive in the mail after your visit with us. Thank you!             Your Updated Medication List - Protect others around you: Learn how to safely use, store and throw away your medicines at www.disposemymeds.org.          This list is accurate as of 6/27/18  3:32 PM.  Always use your most recent med list.                   Brand Name Dispense Instructions for use Diagnosis    aspirin 325 MG EC tablet     3 tablet    Take 1 tablet (325 mg) by mouth daily        cloNIDine 0.1 MG tablet    CATAPRES     Take 0.1 mg by mouth 2 times daily        ESCITALOPRAM OXALATE PO      Take 10 mg by mouth daily        FISH OIL + D3 2989-4823 MG-UNIT Caps           hydrochlorothiazide 25 MG tablet     HYDRODIURIL    3 tablet    Take 1 tablet (25 mg) by mouth daily        lisinopril 20 MG tablet    PRINIVIL/ZESTRIL    3 tablet    Take 1 tablet (20 mg) by mouth daily        metoprolol tartrate 50 MG tablet    LOPRESSOR    60 tablet    Take 1 tablet (50 mg) by mouth 2 times daily    Hypertension goal BP (blood pressure) < 140/90       multivitamin, therapeutic Tabs tablet      Take 1 tablet by mouth daily        VITAMIN B6 PO      Take 100 mg by mouth daily

## 2019-07-28 ENCOUNTER — HOSPITAL ENCOUNTER (EMERGENCY)
Facility: CLINIC | Age: 59
Discharge: HOME | End: 2019-07-28
Attending: EMERGENCY MEDICINE | Admitting: EMERGENCY MEDICINE
Payer: COMMERCIAL

## 2019-07-28 VITALS
SYSTOLIC BLOOD PRESSURE: 180 MMHG | OXYGEN SATURATION: 98 % | DIASTOLIC BLOOD PRESSURE: 98 MMHG | RESPIRATION RATE: 16 BRPM | HEART RATE: 66 BPM | HEIGHT: 70 IN | TEMPERATURE: 97.9 F | WEIGHT: 208 LBS | BODY MASS INDEX: 29.78 KG/M2

## 2019-07-28 DIAGNOSIS — L03.211 FACIAL CELLULITIS: ICD-10-CM

## 2019-07-28 DIAGNOSIS — I10 ESSENTIAL HYPERTENSION: ICD-10-CM

## 2019-07-28 LAB
ANION GAP SERPL CALCULATED.3IONS-SCNC: 6 MMOL/L (ref 3–14)
BASOPHILS # BLD AUTO: 0 10E9/L (ref 0–0.2)
BASOPHILS NFR BLD AUTO: 0.3 %
BUN SERPL-MCNC: 13 MG/DL (ref 7–30)
CALCIUM SERPL-MCNC: 8.7 MG/DL (ref 8.5–10.1)
CHLORIDE SERPL-SCNC: 109 MMOL/L (ref 94–109)
CO2 SERPL-SCNC: 24 MMOL/L (ref 20–32)
CREAT SERPL-MCNC: 0.94 MG/DL (ref 0.66–1.25)
DIFFERENTIAL METHOD BLD: NORMAL
EOSINOPHIL # BLD AUTO: 0.1 10E9/L (ref 0–0.7)
EOSINOPHIL NFR BLD AUTO: 0.8 %
ERYTHROCYTE [DISTWIDTH] IN BLOOD BY AUTOMATED COUNT: 13.5 % (ref 10–15)
GFR SERPL CREATININE-BSD FRML MDRD: 88 ML/MIN/{1.73_M2}
GLUCOSE SERPL-MCNC: 92 MG/DL (ref 70–99)
HCT VFR BLD AUTO: 46.6 % (ref 40–53)
HGB BLD-MCNC: 16.2 G/DL (ref 13.3–17.7)
IMM GRANULOCYTES # BLD: 0 10E9/L (ref 0–0.4)
IMM GRANULOCYTES NFR BLD: 0.2 %
LYMPHOCYTES # BLD AUTO: 1.5 10E9/L (ref 0.8–5.3)
LYMPHOCYTES NFR BLD AUTO: 26.1 %
MCH RBC QN AUTO: 30.5 PG (ref 26.5–33)
MCHC RBC AUTO-ENTMCNC: 34.8 G/DL (ref 31.5–36.5)
MCV RBC AUTO: 88 FL (ref 78–100)
MONOCYTES # BLD AUTO: 1 10E9/L (ref 0–1.3)
MONOCYTES NFR BLD AUTO: 17.1 %
NEUTROPHILS # BLD AUTO: 3.3 10E9/L (ref 1.6–8.3)
NEUTROPHILS NFR BLD AUTO: 55.5 %
NRBC # BLD AUTO: 0 10*3/UL
NRBC BLD AUTO-RTO: 0 /100
PLATELET # BLD AUTO: 191 10E9/L (ref 150–450)
POTASSIUM SERPL-SCNC: 3.6 MMOL/L (ref 3.4–5.3)
RBC # BLD AUTO: 5.32 10E12/L (ref 4.4–5.9)
SODIUM SERPL-SCNC: 139 MMOL/L (ref 133–144)
WBC # BLD AUTO: 5.9 10E9/L (ref 4–11)

## 2019-07-28 PROCEDURE — 80048 BASIC METABOLIC PNL TOTAL CA: CPT | Performed by: EMERGENCY MEDICINE

## 2019-07-28 PROCEDURE — 99284 EMERGENCY DEPT VISIT MOD MDM: CPT | Mod: 25

## 2019-07-28 PROCEDURE — 96365 THER/PROPH/DIAG IV INF INIT: CPT

## 2019-07-28 PROCEDURE — 25000128 H RX IP 250 OP 636: Performed by: EMERGENCY MEDICINE

## 2019-07-28 PROCEDURE — 85025 COMPLETE CBC W/AUTO DIFF WBC: CPT | Performed by: EMERGENCY MEDICINE

## 2019-07-28 RX ORDER — CEPHALEXIN 500 MG/1
500 CAPSULE ORAL 4 TIMES DAILY
Qty: 40 CAPSULE | Refills: 0 | Status: SHIPPED | OUTPATIENT
Start: 2019-07-28 | End: 2019-08-07

## 2019-07-28 RX ORDER — CEFAZOLIN SODIUM 1 G/3ML
1 INJECTION, POWDER, FOR SOLUTION INTRAMUSCULAR; INTRAVENOUS ONCE
Status: COMPLETED | OUTPATIENT
Start: 2019-07-28 | End: 2019-07-28

## 2019-07-28 RX ADMIN — CEFAZOLIN 1 G: 1 INJECTION, POWDER, FOR SOLUTION INTRAMUSCULAR; INTRAVENOUS at 10:05

## 2019-07-28 ASSESSMENT — ENCOUNTER SYMPTOMS
RHINORRHEA: 0
HEADACHES: 1
CHILLS: 0
FEVER: 0

## 2019-07-28 ASSESSMENT — MIFFLIN-ST. JEOR: SCORE: 1759.73

## 2019-07-28 NOTE — ED PROVIDER NOTES
"  History     Chief Complaint:  Eye Problem    The history is provided by the patient.      Rashawn Short is a 59 year old male who presents with an eye problem. The patient states that he woke up 2 mornings ago and his right eye would not open on its own and he had to peal it open. He denies any trauma to the area and he denies ever having this before. He reports swelling and redness to his right upper eyelid. He reports pain around the eye. He has a slight headache. The patient denies any fevers, chills, vision changes, sinus infection, and sinus congestion or drainage.     Allergies:  No known drug allergies.    Medications:    Aspirin  Catapres  Escitalopram oxalate  Hydrochlorothiazide  Lisinopril  Lopressor     Past Medical History:    Depressive disorder  ETOH dependence  Hypertension  Paroxysmal atrial fibrillation  Chemical dependency    Past Surgical History:    ACL right knee reconstruction    Family History:    Arrhythmia    Social History:  Patient is   Tobacco Use: No  Alcohol Use: Yes  PCP: Ludwig Carrillo     Review of Systems   Constitutional: Negative for chills and fever.   HENT: Negative for congestion and rhinorrhea.    Eyes: Negative for visual disturbance.        Swelling and redness around right eye   Neurological: Positive for headaches.   All other systems reviewed and are negative.    Physical Exam   First Vitals:  Patient Vitals for the past 24 hrs:   BP Temp Pulse Resp SpO2 Height Weight   07/28/19 0852 (!) 180/98 97.9  F (36.6  C) 66 16 98 % 1.778 m (5' 10\") 94.3 kg (208 lb)     Physical Exam  Gen:  Pleasant, appears stated age.    Eye and face:  Painless and unrestricted EOM.  Scleral injection to the right eye. Erythema, swelling to the medial canthus. Scratches just to the right and above the medial canthus; macerated tissue at the corner of his eye.  Redness extends from canthus to upper cheek.  Nose is spared.    Nose: No septal abscess.    Musculoskeletal:     Normal " movement of all extremities without evidence for deficit.    Extremities:    No edema.  Atraumatic.      Skin:   Warm and dry.  No rash.    Neurologic:    Non-focal exam without asymmetric weakness or numbness.    Fluent speech.    Psychiatric:     Normal affect with appropriate interaction with examiner.    Emergency Department Course     Laboratory:  CBC:  WBC 5.9, HGB 16.2,   BMP: WNL. (Creatinine 0.94)  Visual Assessment  Visual Acuity-Right 20/20   Visual Acuity-Left 20/15   Vision - Corrective Lenses None     Interventions:  1005: Ancef 1g IV    Emergency Department Course:  9:06 AM Nursing notes and vitals reviewed.  I performed an exam of the patient as documented above.     10:55 AM I rechecked on and updated the patient.    10:59 AM Findings and plan explained to the patient. Patient discharged home with instructions regarding supportive care, medications, and reasons to return. The importance of close follow-up was reviewed.     Impression & Plan      Medical Decision Making:  Rashawn Short is a 59 year old male with a history of alcohol abuse who presents today with about 24 hours of painful swelling to the right eye. On exam, the patient has redness and swelling involving the right lid, right medial canthus, and right upper face. He is non toxic in appearance. He is afebrile but hypertensive in the ED. Visual acuity is preserved and he has no pain with extraocular movement. At this point I do not suspect an orbital cellulitis , ophthalmitis, or corneal abrasion. Symptoms are more extensive than what I typically see with preseptal cellulitis. Given involvement of the upper cheek, I suspect there is more of an facial cellulitis. He was started on ancef given low risk for MRSA. He will be discharged home with keflex. I have recommended PCP follow up in the 24-48 hours for reevaluation. Otherwise, he will return quickly to the ED for any worsening symptoms including swelling, pain, fevers or chills,  or for any other concerns.     Diagnosis:    ICD-10-CM    1. Facial cellulitis L03.211 Basic metabolic panel     Disposition:  discharged to home    Discharge Medications:     Medication List      Started    cephALEXin 500 MG capsule  Commonly known as:  KEFLEX  500 mg, Oral, 4 TIMES DAILY          I, Bradley Aasen, am serving as a scribe on 7/28/2019 at 9:06 AM to personally document services performed by Lia Maya MD based on my observations and the provider's statements to me.        Lia Maya MD  07/28/19 8408

## 2019-07-28 NOTE — ED TRIAGE NOTES
Patient reports think he has a stye in right eye. Patient has swelling and redness to right upper eyelid. Complains of burning to the eye.

## 2019-07-28 NOTE — ED AVS SNAPSHOT
Emergency Department  [address] [place] [address]  Phone:  [phone]  Fax:  [phone]                                    Rashawn Short   MRN: [id number]    Department:   Emergency Department   Date of Visit:  7/28/2019           After Visit Summary Signature Page    I have received my discharge instructions, and my questions have been answered. I have discussed any challenges I see with this plan with the nurse or doctor.    ..........................................................................................................................................  Patient/Patient Representative Signature      ..........................................................................................................................................  Patient Representative Print Name and Relationship to Patient    ..................................................               ................................................  Date                                   Time    ..........................................................................................................................................  Reviewed by Signature/Title    ...................................................              ..............................................  Date                                               Time          22EPIC Rev 08/18

## 2019-09-27 ENCOUNTER — MEDICAL CORRESPONDENCE (OUTPATIENT)
Dept: HEALTH INFORMATION MANAGEMENT | Facility: CLINIC | Age: 59
End: 2019-09-27

## 2019-10-03 ENCOUNTER — HEALTH MAINTENANCE LETTER (OUTPATIENT)
Age: 59
End: 2019-10-03

## 2019-10-28 ENCOUNTER — HOSPITAL ENCOUNTER (OUTPATIENT)
Dept: CARDIOLOGY | Facility: CLINIC | Age: 59
Discharge: HOME | End: 2019-10-28
Attending: FAMILY MEDICINE | Admitting: FAMILY MEDICINE
Payer: COMMERCIAL

## 2019-10-28 DIAGNOSIS — E78.5 HYPERLIPIDEMIA: ICD-10-CM

## 2019-10-28 PROCEDURE — 75571 CT HRT W/O DYE W/CA TEST: CPT | Mod: GA

## 2019-10-28 PROCEDURE — 75571 CT HRT W/O DYE W/CA TEST: CPT | Mod: 26 | Performed by: INTERNAL MEDICINE

## 2020-11-07 ENCOUNTER — HEALTH MAINTENANCE LETTER (OUTPATIENT)
Age: 60
End: 2020-11-07

## 2020-12-10 DIAGNOSIS — Z11.59 ENCOUNTER FOR SCREENING FOR OTHER VIRAL DISEASES: Primary | ICD-10-CM

## 2020-12-24 DIAGNOSIS — Z11.59 ENCOUNTER FOR SCREENING FOR OTHER VIRAL DISEASES: ICD-10-CM

## 2020-12-24 LAB
SARS-COV-2 RNA SPEC QL NAA+PROBE: NOT DETECTED
SPECIMEN SOURCE: NORMAL

## 2020-12-24 PROCEDURE — U0003 INFECTIOUS AGENT DETECTION BY NUCLEIC ACID (DNA OR RNA); SEVERE ACUTE RESPIRATORY SYNDROME CORONAVIRUS 2 (SARS-COV-2) (CORONAVIRUS DISEASE [COVID-19]), AMPLIFIED PROBE TECHNIQUE, MAKING USE OF HIGH THROUGHPUT TECHNOLOGIES AS DESCRIBED BY CMS-2020-01-R: HCPCS | Performed by: COLON & RECTAL SURGERY

## 2020-12-24 NOTE — PROGRESS NOTES
"Electrophysiology/ Clinic Note         H&P and Plan:     [id number]      Manuel Romero MD    Physical Exam:  Vitals: BP (!) 181/97 (BP Location: Left arm, Cuff Size: Adult Large)  Pulse 72  Ht 1.803 m (5' 11\")  Wt 93.8 kg (206 lb 12.8 oz)  BMI 28.84 kg/m2    Constitutional:  AAO x3.  Pt is in NAD.  HEAD: normocephalic.  SKIN: Skin normal color, texture and turgor with no lesions or eruptions.  Eyes: PERRL, EOMI.  ENT:  Supple, normal JVP. No lymphadenopathy or thyroid enlargement.  Chest:  CTAB.  Cardiac:  RRR, normal  S1 and S2.  No murmurs rubs or gallop.    Abdomen:  Normal BS.  Soft, non-tender and non-distended.  No rebound or guarding.    Extremities:  Pedious pulses palpable B/L.  No LE edema noticed.   Neurological: Strength and sensation grossly symmetric and intact throughout.       CURRENT MEDICATIONS:  Current Outpatient Prescriptions   Medication Sig Dispense Refill     escitalopram (LEXAPRO) 10 MG tablet Take 1 tablet (10 mg) by mouth daily 30 tablet 0     cloNIDine (CATAPRES) 0.1 MG tablet Take 0.1 mg by mouth 2 times daily       lisinopril (PRINIVIL/ZESTRIL) 20 MG tablet Take 20 mg by mouth daily       hydrochlorothiazide (HYDRODIURIL) 25 MG tablet Take 25 mg by mouth daily       metoprolol (LOPRESSOR) 50 MG tablet Take 50 mg by mouth 2 times daily        Multiple Vitamins-Minerals (MULTIVITAMIN ADULTS 50+) TABS Take 1 tablet by mouth       aspirin 325 MG tablet Take 1 tablet by mouth daily. (Patient taking differently: Take 162 mg by mouth daily ) 30 tablet 0     thiamine 100 MG tablet Take 1 tablet by mouth daily. 30 tablet 0     fish oil-omega-3 fatty acids 1000 MG capsule Take 2 capsules by mouth daily. (Patient taking differently: Take 1 g by mouth daily ) 30 capsule 0       ALLERGIES   No Known Allergies    PAST MEDICAL HISTORY:  Past Medical History:   Diagnosis Date     Depressive disorder      EtOH dependence (H)      Hypertension      Paroxysmal atrial fibrillation (H) 01/29/2010 "       PAST SURGICAL HISTORY:  Past Surgical History:   Procedure Laterality Date     ORTHOPEDIC SURGERY  1998    ACL Right knee reconstruction-        FAMILY HISTORY:  Family History   Problem Relation Age of Onset     Arrhythmia Father      a-fib     C.A.D. No family hx of      DIABETES No family hx of      Hypertension No family hx of      CEREBROVASCULAR DISEASE No family hx of      Breast Cancer No family hx of      Cancer - colorectal No family hx of      Prostate Cancer No family hx of        SOCIAL HISTORY:  Social History     Social History     Marital status:      Spouse name: N/A     Number of children: N/A     Years of education: N/A     Social History Main Topics     Smoking status: Never Smoker     Smokeless tobacco: Never Used     Alcohol use Yes      Comment: etoh abuse     Drug use: No     Sexual activity: Yes     Partners: Female     Other Topics Concern     None     Social History Narrative       Review of Systems:  Skin:  Negative     Eyes:  Positive for    ENT:  Negative    Respiratory:  Negative    Cardiovascular:  Negative;chest pain;palpitations;cyanosis;syncope or near-syncope;exercise intolerance;lightheadedness;edema;fatigue    Gastroenterology: Negative    Genitourinary:  not assessed    Musculoskeletal:  Negative    Neurologic:  Negative    Psychiatric:  Negative    Heme/Lymph/Imm:  Negative    Endocrine:  Negative        Recent Lab Results:  LIPID RESULTS:  Lab Results   Component Value Date    CHOL 208 (H) 03/02/2005    HDL 61 03/02/2005     03/02/2005    TRIG 112 03/02/2005    CHOLHDLRATIO 3.4 03/02/2005       LIVER ENZYME RESULTS:  Lab Results   Component Value Date    AST 48 (H) 01/03/2017    ALT 43 01/03/2017       CBC RESULTS:  Lab Results   Component Value Date    WBC 7.5 01/03/2017    RBC 4.73 01/03/2017    HGB 15.5 01/03/2017    HCT 45.6 01/03/2017    MCV 96 01/03/2017    MCH 32.8 01/03/2017    MCHC 34.0 01/03/2017    RDW 14.4 01/03/2017     01/03/2017        BMP RESULTS:  Lab Results   Component Value Date     01/03/2017    POTASSIUM 3.8 01/03/2017    CHLORIDE 102 01/03/2017    CO2 26 01/03/2017    ANIONGAP 9 01/03/2017     (H) 01/03/2017    BUN 8 01/03/2017    CR 0.88 01/03/2017    GFRESTIMATED 89 01/03/2017    GFRESTBLACK >90   GFR Calc   01/03/2017    KELSEY 8.8 01/03/2017        A1C RESULTS:  No results found for: A1C    INR RESULTS:  Lab Results   Component Value Date    INR 0.94 01/27/2010    INR 0.97 03/01/2005         ECHOCARDIOGRAM  No results found for this or any previous visit (from the past 8760 hour(s)).      Orders Placed This Encounter   Procedures     EKG 12-lead complete w/read - Clinics (performed today)     Echocardiogram     No orders of the defined types were placed in this encounter.    There are no discontinued medications.      Encounter Diagnoses   Name Primary?     Paroxysmal atrial fibrillation (H) Yes     Hypertension goal BP (blood pressure) < 140/90          CC  No referring provider defined for this encounter.               John Gonsalves

## 2020-12-28 ENCOUNTER — APPOINTMENT (OUTPATIENT)
Dept: GENERAL RADIOLOGY | Facility: CLINIC | Age: 60
End: 2020-12-28
Attending: EMERGENCY MEDICINE
Payer: COMMERCIAL

## 2020-12-28 ENCOUNTER — APPOINTMENT (OUTPATIENT)
Dept: CARDIOLOGY | Facility: CLINIC | Age: 60
End: 2020-12-28
Attending: INTERNAL MEDICINE
Payer: COMMERCIAL

## 2020-12-28 ENCOUNTER — HOSPITAL ENCOUNTER (OUTPATIENT)
Facility: CLINIC | Age: 60
Discharge: HOME | End: 2020-12-28
Attending: COLON & RECTAL SURGERY | Admitting: COLON & RECTAL SURGERY
Payer: COMMERCIAL

## 2020-12-28 ENCOUNTER — HOSPITAL ENCOUNTER (OUTPATIENT)
Facility: CLINIC | Age: 60
Discharge: HOME | End: 2020-12-29
Attending: EMERGENCY MEDICINE | Admitting: INTERNAL MEDICINE
Payer: COMMERCIAL

## 2020-12-28 VITALS
TEMPERATURE: 98.1 F | HEIGHT: 70 IN | BODY MASS INDEX: 29.78 KG/M2 | HEART RATE: 143 BPM | SYSTOLIC BLOOD PRESSURE: 160 MMHG | DIASTOLIC BLOOD PRESSURE: 135 MMHG | WEIGHT: 208 LBS | RESPIRATION RATE: 16 BRPM | OXYGEN SATURATION: 100 %

## 2020-12-28 DIAGNOSIS — I48.91 ATRIAL FIBRILLATION WITH RVR (H): ICD-10-CM

## 2020-12-28 LAB
ALBUMIN SERPL-MCNC: 3.9 G/DL (ref 3.4–5)
ALP SERPL-CCNC: 76 U/L (ref 40–150)
ALT SERPL W P-5'-P-CCNC: 98 U/L (ref 0–70)
ANION GAP SERPL CALCULATED.3IONS-SCNC: 3 MMOL/L (ref 3–14)
AST SERPL W P-5'-P-CCNC: 50 U/L (ref 0–45)
BASOPHILS # BLD AUTO: 0 10E9/L (ref 0–0.2)
BASOPHILS NFR BLD AUTO: 0.3 %
BILIRUB SERPL-MCNC: 0.6 MG/DL (ref 0.2–1.3)
BUN SERPL-MCNC: 13 MG/DL (ref 7–30)
CALCIUM SERPL-MCNC: 8.8 MG/DL (ref 8.5–10.1)
CHLORIDE SERPL-SCNC: 103 MMOL/L (ref 94–109)
CO2 SERPL-SCNC: 30 MMOL/L (ref 20–32)
CREAT SERPL-MCNC: 1.07 MG/DL (ref 0.66–1.25)
DIFFERENTIAL METHOD BLD: NORMAL
EOSINOPHIL # BLD AUTO: 0.1 10E9/L (ref 0–0.7)
EOSINOPHIL NFR BLD AUTO: 0.9 %
ERYTHROCYTE [DISTWIDTH] IN BLOOD BY AUTOMATED COUNT: 13.7 % (ref 10–15)
GFR SERPL CREATININE-BSD FRML MDRD: 74 ML/MIN/{1.73_M2}
GLUCOSE SERPL-MCNC: 98 MG/DL (ref 70–99)
HCT VFR BLD AUTO: 52 % (ref 40–53)
HGB BLD-MCNC: 17.3 G/DL (ref 13.3–17.7)
IMM GRANULOCYTES # BLD: 0 10E9/L (ref 0–0.4)
IMM GRANULOCYTES NFR BLD: 0.4 %
INR PPP: 0.98 (ref 0.86–1.14)
INTERPRETATION ECG - MUSE: NORMAL
LABORATORY COMMENT REPORT: NORMAL
LYMPHOCYTES # BLD AUTO: 2.5 10E9/L (ref 0.8–5.3)
LYMPHOCYTES NFR BLD AUTO: 33.3 %
MAGNESIUM SERPL-MCNC: 2.6 MG/DL (ref 1.6–2.3)
MCH RBC QN AUTO: 29.7 PG (ref 26.5–33)
MCHC RBC AUTO-ENTMCNC: 33.3 G/DL (ref 31.5–36.5)
MCV RBC AUTO: 89 FL (ref 78–100)
MONOCYTES # BLD AUTO: 0.9 10E9/L (ref 0–1.3)
MONOCYTES NFR BLD AUTO: 12.3 %
NEUTROPHILS # BLD AUTO: 4 10E9/L (ref 1.6–8.3)
NEUTROPHILS NFR BLD AUTO: 52.8 %
NRBC # BLD AUTO: 0 10*3/UL
NRBC BLD AUTO-RTO: 0 /100
PHOSPHATE SERPL-MCNC: 2.2 MG/DL (ref 2.5–4.5)
PHOSPHATE SERPL-MCNC: 2.8 MG/DL (ref 2.5–4.5)
PLATELET # BLD AUTO: 240 10E9/L (ref 150–450)
POTASSIUM SERPL-SCNC: 3.7 MMOL/L (ref 3.4–5.3)
PROT SERPL-MCNC: 7.7 G/DL (ref 6.8–8.8)
RBC # BLD AUTO: 5.83 10E12/L (ref 4.4–5.9)
SARS-COV-2 RNA SPEC QL NAA+PROBE: NEGATIVE
SODIUM SERPL-SCNC: 136 MMOL/L (ref 133–144)
SPECIMEN SOURCE: NORMAL
TROPONIN I SERPL-MCNC: <0.015 UG/L (ref 0–0.04)
TSH SERPL DL<=0.005 MIU/L-ACNC: 3.39 MU/L (ref 0.4–4)
WBC # BLD AUTO: 7.5 10E9/L (ref 4–11)

## 2020-12-28 PROCEDURE — 99223 1ST HOSP IP/OBS HIGH 75: CPT | Mod: AI | Performed by: INTERNAL MEDICINE

## 2020-12-28 PROCEDURE — 250N000013 HC RX MED GY IP 250 OP 250 PS 637: Performed by: INTERNAL MEDICINE

## 2020-12-28 PROCEDURE — 84100 ASSAY OF PHOSPHORUS: CPT | Performed by: EMERGENCY MEDICINE

## 2020-12-28 PROCEDURE — 84484 ASSAY OF TROPONIN QUANT: CPT | Mod: 91 | Performed by: INTERNAL MEDICINE

## 2020-12-28 PROCEDURE — 36415 COLL VENOUS BLD VENIPUNCTURE: CPT | Performed by: INTERNAL MEDICINE

## 2020-12-28 PROCEDURE — 84443 ASSAY THYROID STIM HORMONE: CPT | Performed by: EMERGENCY MEDICINE

## 2020-12-28 PROCEDURE — 999N000208 ECHOCARDIOGRAM COMPLETE

## 2020-12-28 PROCEDURE — 93005 ELECTROCARDIOGRAM TRACING: CPT | Mod: 76

## 2020-12-28 PROCEDURE — 255N000002 HC RX 255 OP 636: Performed by: INTERNAL MEDICINE

## 2020-12-28 PROCEDURE — 999N000001 HC CANCELLED SURGERY UP TO 15 MINS: Performed by: COLON & RECTAL SURGERY

## 2020-12-28 PROCEDURE — 93005 ELECTROCARDIOGRAM TRACING: CPT

## 2020-12-28 PROCEDURE — 96365 THER/PROPH/DIAG IV INF INIT: CPT | Mod: 59

## 2020-12-28 PROCEDURE — 99285 EMERGENCY DEPT VISIT HI MDM: CPT | Mod: 25

## 2020-12-28 PROCEDURE — 84100 ASSAY OF PHOSPHORUS: CPT | Performed by: INTERNAL MEDICINE

## 2020-12-28 PROCEDURE — C9803 HOPD COVID-19 SPEC COLLECT: HCPCS

## 2020-12-28 PROCEDURE — 85025 COMPLETE CBC W/AUTO DIFF WBC: CPT | Performed by: EMERGENCY MEDICINE

## 2020-12-28 PROCEDURE — 96366 THER/PROPH/DIAG IV INF ADDON: CPT

## 2020-12-28 PROCEDURE — 71045 X-RAY EXAM CHEST 1 VIEW: CPT

## 2020-12-28 PROCEDURE — 96376 TX/PRO/DX INJ SAME DRUG ADON: CPT | Mod: 59

## 2020-12-28 PROCEDURE — 85610 PROTHROMBIN TIME: CPT | Performed by: EMERGENCY MEDICINE

## 2020-12-28 PROCEDURE — 210N000002 HC R&B HEART CARE

## 2020-12-28 PROCEDURE — 93010 ELECTROCARDIOGRAM REPORT: CPT | Performed by: INTERNAL MEDICINE

## 2020-12-28 PROCEDURE — 80053 COMPREHEN METABOLIC PANEL: CPT | Performed by: EMERGENCY MEDICINE

## 2020-12-28 PROCEDURE — 93306 TTE W/DOPPLER COMPLETE: CPT | Mod: 26 | Performed by: INTERNAL MEDICINE

## 2020-12-28 PROCEDURE — 258N000003 HC RX IP 258 OP 636: Performed by: EMERGENCY MEDICINE

## 2020-12-28 PROCEDURE — 250N000009 HC RX 250: Performed by: EMERGENCY MEDICINE

## 2020-12-28 PROCEDURE — 96361 HYDRATE IV INFUSION ADD-ON: CPT

## 2020-12-28 PROCEDURE — 87635 SARS-COV-2 COVID-19 AMP PRB: CPT | Performed by: EMERGENCY MEDICINE

## 2020-12-28 PROCEDURE — 84484 ASSAY OF TROPONIN QUANT: CPT | Performed by: EMERGENCY MEDICINE

## 2020-12-28 PROCEDURE — 83735 ASSAY OF MAGNESIUM: CPT | Performed by: EMERGENCY MEDICINE

## 2020-12-28 RX ORDER — AMOXICILLIN 250 MG
2 CAPSULE ORAL 2 TIMES DAILY PRN
Status: DISCONTINUED | OUTPATIENT
Start: 2020-12-28 | End: 2020-12-29 | Stop reason: HOSPADM

## 2020-12-28 RX ORDER — ONDANSETRON 4 MG/1
4 TABLET, ORALLY DISINTEGRATING ORAL EVERY 6 HOURS PRN
Status: DISCONTINUED | OUTPATIENT
Start: 2020-12-28 | End: 2020-12-29 | Stop reason: HOSPADM

## 2020-12-28 RX ORDER — HYDROCODONE BITARTRATE AND ACETAMINOPHEN 5; 325 MG/1; MG/1
1-2 TABLET ORAL EVERY 4 HOURS PRN
Status: DISCONTINUED | OUTPATIENT
Start: 2020-12-28 | End: 2020-12-29 | Stop reason: HOSPADM

## 2020-12-28 RX ORDER — AMOXICILLIN 250 MG
1 CAPSULE ORAL 2 TIMES DAILY PRN
Status: DISCONTINUED | OUTPATIENT
Start: 2020-12-28 | End: 2020-12-29 | Stop reason: HOSPADM

## 2020-12-28 RX ORDER — NALOXONE HYDROCHLORIDE 0.4 MG/ML
0.2 INJECTION, SOLUTION INTRAMUSCULAR; INTRAVENOUS; SUBCUTANEOUS
Status: DISCONTINUED | OUTPATIENT
Start: 2020-12-28 | End: 2020-12-29 | Stop reason: HOSPADM

## 2020-12-28 RX ORDER — DILTIAZEM HYDROCHLORIDE 5 MG/ML
10 INJECTION INTRAVENOUS ONCE
Status: COMPLETED | OUTPATIENT
Start: 2020-12-28 | End: 2020-12-28

## 2020-12-28 RX ORDER — ACETAMINOPHEN 325 MG/1
650 TABLET ORAL EVERY 4 HOURS PRN
Status: DISCONTINUED | OUTPATIENT
Start: 2020-12-28 | End: 2020-12-29 | Stop reason: HOSPADM

## 2020-12-28 RX ORDER — NITROGLYCERIN 0.4 MG/1
0.4 TABLET SUBLINGUAL EVERY 5 MIN PRN
Status: DISCONTINUED | OUTPATIENT
Start: 2020-12-28 | End: 2020-12-29 | Stop reason: HOSPADM

## 2020-12-28 RX ORDER — PROCHLORPERAZINE 25 MG
25 SUPPOSITORY, RECTAL RECTAL EVERY 12 HOURS PRN
Status: DISCONTINUED | OUTPATIENT
Start: 2020-12-28 | End: 2020-12-29 | Stop reason: HOSPADM

## 2020-12-28 RX ORDER — NALOXONE HYDROCHLORIDE 0.4 MG/ML
0.4 INJECTION, SOLUTION INTRAMUSCULAR; INTRAVENOUS; SUBCUTANEOUS
Status: DISCONTINUED | OUTPATIENT
Start: 2020-12-28 | End: 2020-12-29 | Stop reason: HOSPADM

## 2020-12-28 RX ORDER — CHLORAL HYDRATE 500 MG
1 CAPSULE ORAL DAILY
COMMUNITY

## 2020-12-28 RX ORDER — BISACODYL 10 MG
10 SUPPOSITORY, RECTAL RECTAL DAILY PRN
Status: DISCONTINUED | OUTPATIENT
Start: 2020-12-28 | End: 2020-12-29 | Stop reason: HOSPADM

## 2020-12-28 RX ORDER — LIDOCAINE 40 MG/G
CREAM TOPICAL
Status: DISCONTINUED | OUTPATIENT
Start: 2020-12-28 | End: 2020-12-29

## 2020-12-28 RX ORDER — LANOLIN ALCOHOL/MO/W.PET/CERES
100 CREAM (GRAM) TOPICAL DAILY
COMMUNITY

## 2020-12-28 RX ORDER — SODIUM CHLORIDE 9 MG/ML
INJECTION, SOLUTION INTRAVENOUS CONTINUOUS
Status: DISCONTINUED | OUTPATIENT
Start: 2020-12-28 | End: 2020-12-28

## 2020-12-28 RX ORDER — LIDOCAINE 40 MG/G
CREAM TOPICAL
Status: DISCONTINUED | OUTPATIENT
Start: 2020-12-28 | End: 2020-12-29 | Stop reason: HOSPADM

## 2020-12-28 RX ORDER — METOPROLOL SUCCINATE 50 MG/1
50 TABLET, EXTENDED RELEASE ORAL 2 TIMES DAILY
Status: DISCONTINUED | OUTPATIENT
Start: 2020-12-28 | End: 2020-12-29

## 2020-12-28 RX ORDER — ACETAMINOPHEN 650 MG/1
650 SUPPOSITORY RECTAL EVERY 4 HOURS PRN
Status: DISCONTINUED | OUTPATIENT
Start: 2020-12-28 | End: 2020-12-29 | Stop reason: HOSPADM

## 2020-12-28 RX ORDER — LISINOPRIL 20 MG/1
20 TABLET ORAL DAILY
Status: ON HOLD | COMMUNITY
End: 2020-12-29

## 2020-12-28 RX ORDER — ONDANSETRON 2 MG/ML
4 INJECTION INTRAMUSCULAR; INTRAVENOUS EVERY 6 HOURS PRN
Status: DISCONTINUED | OUTPATIENT
Start: 2020-12-28 | End: 2020-12-29 | Stop reason: HOSPADM

## 2020-12-28 RX ORDER — PROCHLORPERAZINE MALEATE 5 MG
10 TABLET ORAL EVERY 6 HOURS PRN
Status: DISCONTINUED | OUTPATIENT
Start: 2020-12-28 | End: 2020-12-29 | Stop reason: HOSPADM

## 2020-12-28 RX ADMIN — SODIUM CHLORIDE: 9 INJECTION, SOLUTION INTRAVENOUS at 10:24

## 2020-12-28 RX ADMIN — SODIUM CHLORIDE 1000 ML: 9 INJECTION, SOLUTION INTRAVENOUS at 09:48

## 2020-12-28 RX ADMIN — ASPIRIN 325 MG: 325 TABLET, COATED ORAL at 14:16

## 2020-12-28 RX ADMIN — DILTIAZEM HYDROCHLORIDE 10 MG: 5 INJECTION INTRAVENOUS at 11:13

## 2020-12-28 RX ADMIN — DILTIAZEM HYDROCHLORIDE 10 MG/HR: 5 INJECTION INTRAVENOUS at 10:17

## 2020-12-28 RX ADMIN — METOPROLOL SUCCINATE 50 MG: 50 TABLET, EXTENDED RELEASE ORAL at 20:09

## 2020-12-28 RX ADMIN — DILTIAZEM HYDROCHLORIDE 10 MG: 5 INJECTION INTRAVENOUS at 09:49

## 2020-12-28 RX ADMIN — HUMAN ALBUMIN MICROSPHERES AND PERFLUTREN 9 ML: 10; .22 INJECTION, SOLUTION INTRAVENOUS at 14:44

## 2020-12-28 RX ADMIN — METOPROLOL SUCCINATE 50 MG: 50 TABLET, EXTENDED RELEASE ORAL at 14:16

## 2020-12-28 ASSESSMENT — ENCOUNTER SYMPTOMS
NERVOUS/ANXIOUS: 1
SHORTNESS OF BREATH: 0

## 2020-12-28 ASSESSMENT — ACTIVITIES OF DAILY LIVING (ADL)
ADLS_ACUITY_SCORE: 13
ADLS_ACUITY_SCORE: 11

## 2020-12-28 ASSESSMENT — MIFFLIN-ST. JEOR
SCORE: 1754.73
SCORE: 1754.73
SCORE: 1814.15

## 2020-12-28 NOTE — PHARMACY-ADMISSION MEDICATION HISTORY
Pharmacy Medication History  Admission medication history interview status for the 12/28/2020  admission is complete. See EPIC admission navigator for prior to admission medications       Medication history sources: Patient and Pharmacy (spoke with patient's pharmacy - [facility] to confirm that he is filling lisinopril and hctz at home. )  Location of interview: Phone    Medication reconciliation completed by provider prior to medication history? No    Time spent in this activity: 10 minutes      Prior to Admission medications    Medication Sig Last Dose Taking? Auth Provider   aspirin 325 MG EC tablet Take 1 tablet (325 mg) by mouth daily 12/27/2020 at am Yes Tim Epstein MD   fish oil-omega-3 fatty acids 1000 MG capsule Take 1 g by mouth daily 12/27/2020 at am Yes Unknown, Entered By History   hydrochlorothiazide (HYDRODIURIL) 25 MG tablet Take 1 tablet (25 mg) by mouth daily 12/27/2020 at am Yes Tim Epstein MD   lisinopril (ZESTRIL) 20 MG tablet Take 20 mg by mouth daily 12/27/2020 at am Yes Unknown, Entered By History   multivitamin, therapeutic (THERA-VIT) TABS tablet Take 1 tablet by mouth daily 12/27/2020 at am Yes Reported, Patient   thiamine (B-1) 100 MG tablet Take 100 mg by mouth daily 12/27/2020 at am Yes Unknown, Entered By History       The information provided in this note is only as accurate as the sources available at the time of the update(s).

## 2020-12-28 NOTE — H&P
Admitted:     12/28/2020      PRIMARY CARE PHYSICIAN:  Dr. Ludwig Carrillo.      CODE STATUS:  Full code.      CHIEF COMPLAINT:  Tachycardia.      HISTORY OF PRESENT ILLNESS:  Mr. Short is a 60-year-old male with a past medical history significant for hypertension, alcohol abuse, now 3 years sober and distant paroxysmal atrial fibrillation who presents to the Emergency Department today with heart rates in the 160s prior to getting his colonoscopy.  History is obtained through discussion with the ED physician as well as the patient.  The patient reports that he completed his prep yesterday and today for a routine colonoscopy and showed up today feeling perfectly normal [facility] and as he was getting on the table his preprocedure vitals showed a heart rate of the 160s and what looked like atrial fibrillation, so he was sent to the ED for further evaluation.  Here, his heart rate has been bouncing around in the 120s to 160s, but has been more or less sustained in the high 140s to 160s prior to intervention.  The patient denies any chest pain, shortness of breath, palpitations, lightheadedness or dizziness and otherwise feels completely asymptomatic.  The patient states that he has been feeling a little anxious leading up to this procedure because it is not something he can control and he otherwise is able to control most things fairly well.  He states he did not sleep well last night, presumably due to the anxiety and is feeling a little tired, but otherwise does not have any specific symptoms over the past several days.  He specifically denies any shortness of breath, chest pain, palpitations, fevers, chills, cough or any sick contacts.      The patient does state to me that he has a history of atrial fibrillation many years ago, he believes in 2017 for which he was briefly on metoprolol.  He is not currently taking a beta blocker, but has been diligent about taking his other antihypertensives.   He also takes a full-dose aspirin.  He specifically denies any previous cardiac issues other than atrial fibrillation distantly.  No history of stroke.  He does have a history of alcohol use, drinking fairly heavily, but has been sober now for 3 years.  Per report, it was thought that his atrial fibrillation in the past could have been due to alcohol use.  He does tell me that his father has atrial fibrillation as well.      In the ED, the patient was given a diltiazem bolus and drip and heart rate has improved somewhat, occasionally into the 1-teens, but still bouncing up into the 130s.  A chest x-ray was negative.  The patient currently feels very well.  He specifically denies any exertional chest pain, any lower extremity edema or any other symptoms.      MEDICATIONS:   1.  Aspirin 325 mg daily.   2.  Fish oil 1 gram daily.   3.  Hydrochlorothiazide 25 mg daily.   4.  Lisinopril 20 mg daily.   5.  Multivitamin 1 tablet daily.   6.  Vitamin B1 at 100 mg daily.      SOCIAL HISTORY:  The patient denies any use of tobacco and has not had any alcohol for 3 years.      FAMILY HISTORY:  Father has atrial fibrillation and is currently residing in a nursing home.      ALLERGIES:  NO KNOWN DRUG ALLERGIES.      REVIEW OF SYSTEMS:  The complete review of systems reviewed and negative, save for the pertinent positives recorded in HPI.      PHYSICAL EXAMINATION:   VITAL SIGNS:  Show blood pressure of 146/105, heart rate 96, respirations 18, satting 98% on room air, temperature 98.7 degrees Fahrenheit.   GENERAL:  The patient is sitting in bed and appears comfortable.   HEENT:  Pupils equal and reactive to light, extraocular muscle function intact.  No scleral icterus.  Oropharynx is clear.   NECK:  No lymphadenopathy or thyromegaly.   CARDIOVASCULAR:  Tachycardic and irregular without any appreciable murmur.   PULMONARY:  Lungs are clear to auscultation bilaterally without wheeze or rhonchi.   GASTROINTESTINAL:  Positive bowel  sounds, soft, nontender and nondistended.   SKIN:  No rashes or lesions.   LYMPHATICS:  No peripheral edema.   PSYCHIATRIC:  Alert and oriented x 3, normal affect.   NEUROLOGIC:  Cranial nerves II-XII are grossly intact.  No focal deficits.      LABORATORIES:  CBC, BMP, and LFTs unremarkable.  Troponin is undetectable.  Chest x-ray is negative.      I personally reviewed his EKG showing atrial fibrillation with rapid ventricular response.      ASSESSMENT AND PLAN:  Mr. Rome is a 60-year-old male with a past medical history significant for paroxysmal atrial fibrillation, possibly associated with alcohol abuse, now sober for 3 years, hypertension and depression who presents to the Emergency Department with tachycardia that is asymptomatic.   1.  Asymptomatic atrial fibrillation with rapid ventricular response:  Duration is unclear given that he is asymptomatic.  His history of paroxysmal atrial fibrillation distantly even if potentially associated with alcohol will put him at increased risk of having recurrence of this.  He does not have any ischemic type symptoms, but I think it would be prudent to monitor his troponins and obtain echocardiogram.  He is currently on a diltiazem drip.  I am going to start b.i.d. metoprolol as he seemed to respond well to this in the past.  I am going to hold his other antihypertensives to give us room to titrate AV amie blocking agents.  His CHADS2-VASc score is 1, so will continue with full dose aspirin for now.  If we have trouble controlling his rate, may need to consider Cardiology consultation.   2.  Hypertension:  Blood pressure is currently mildly elevated, but will be titrating diltiazem and metoprolol.  I am going to hold hydrochlorothiazide and lisinopril for now.   3.  History of alcohol abuse:  He is 3 years sober.   4.  Deep venous thrombosis prophylaxis:  SCDs.         EVA CARNEY DO             D: 12/28/2020   T: 12/28/2020   MT: TRIPP      Name:     LATRICE ROME    MRN:      [id number]        Account:      [id number]   :      1960        Admitted:     2020                   Document: [id number]       cc: Ludwig Carrillo MD

## 2020-12-28 NOTE — PROVIDER NOTIFICATION
Paged Dr. Waggoner 12/28/20 5:02 PM to clarify diltiazem orders as heart rate has improved to 60s-100s, intermittently below hold parameter of HR <80 on 10 mg/hr. Orders received: telephone order to change diltiazem drip to 5-15 mg/hr with same hold parameters, stop drip now and restart if heart rate becomes uncontrolled again.

## 2020-12-28 NOTE — ED PROVIDER NOTES
"  History   Chief Complaint:  Atrial Fib     HPI   Rashawn Short is a 60 year old male with history of hypertension and paroxysmal Afib who presents with Afib. The patient comes from [facility] while receiving a colonoscopy from Dr. Diana at [facility]. He reports feeling great over the past few months, and is surprised by this. He is anxious, but does not know why. No shortness of breath or chest pain. Says his colonoscopy prep went just fine.     Review of Systems   Respiratory: Negative for shortness of breath.    Cardiovascular: Negative for chest pain.   Psychiatric/Behavioral: The patient is nervous/anxious.    All other systems reviewed and are negative.    Medications:  Aspirin 325 mg  Catapres  Escitalopram  Hydrodiuril  Lisinopril  Metoprolol    Past Medical History:    Depression  Alcohol dependence  Hypertension  Paroxsymal Afib  Mood disorder    Past Surgical History:    Right ACL reconstruction    Family History:    Afib (Father)    Social History:  Presents to ED from [facility]  History of alcohol dependence but just passed 3 years of sobriety  PCP: Ludwig Carrillo    Physical Exam     Patient Vitals for the past 24 hrs:   BP Temp Temp src Pulse Resp SpO2 Height Weight   12/28/20 1430 126/79 -- -- 77 -- -- -- --   12/28/20 1400 119/89 -- -- 116 -- -- -- --   12/28/20 1330 (!) 149/128 -- -- 88 -- -- -- --   12/28/20 1300 (!) 110/92 -- -- 89 18 -- -- --   12/28/20 1253 -- -- -- -- -- -- -- 100.3 kg (221 lb 1.6 oz)   12/28/20 1242 (!) 146/105 98.7  F (37.1  C) Oral 96 18 98 % -- --   12/28/20 1130 (!) 161/110 -- -- 163 25 95 % -- --   12/28/20 1118 -- -- -- 121 9 95 % -- --   12/28/20 1100 (!) 143/134 -- -- 160 13 97 % -- --   12/28/20 1000 (!) 145/126 -- -- 124 26 -- -- --   12/28/20 0936 -- 97.8  F (36.6  C) Oral -- 20 99 % 1.778 m (5' 10\") 94.3 kg (208 lb)   12/28/20 0995 (!) 171/123 -- -- 172 -- -- -- --       Physical Exam  General: Alert, No distress. Nontoxic appearance  Head: No " signs of trauma.   Mouth/Throat: Oropharynx moist.   Eyes: Conjunctivae are normal. Pupils are equal..   Neck: Normal range of motion.    CV: Tachycardic and irregular.  Resp:No respiratory distress.   MSK: Normal range of motion. No obvious deformity.   Neuro: The patient is alert and interactive. YEPEZ. Speech normal. GCS 15  Skin: No lesion or sign of trauma noted.   Psych: normal mood and affect. behavior is normal.     Emergency Department Course   ECG:  ECG taken at 0914, ECG read at 0958  Atrial fibrillation with rapid ventricular response  Nonspecific ST wave abnormality  Abnormal ECG  Rate 151 bpm. CA interval * ms. QRS duration 102 ms. QT/QTc 324/513 ms. P-R-T axes * -12 -13.    ECG:  ECG taken at 0955, ECG read at 0958  Atrial fibrillation with rapid ventricular response with premature ventricular aberrantly conducted complexes  Inferior infarct, age undetermined  Abnormal ECG  Rate 123 bpm. CA interval * ms. QRS duration 94 ms. QT/QTc 294/420 ms. P-R-T axes * -15 -28.    Imaging:  XR Chest Port 1 View  Single portable AP view of the chest was obtained. Mild  enlargement of the cardiac silhouette. No significant pleural effusion  or pneumothorax. No suspicious focal pulmonary opacities.  Reading per radiology     Laboratory:  CBC: WBC 7.5, HGB 17.3,   CMP: ALT 98 (H) o/w WNL (Creatinine 1.07)    Troponin (Collected 0946): <0.015  TSH with free T4 reflex: 3.39  INR: 0.98  Phosphorus: 2.2 (L)  Magnesium: 2.6 (H)    Asymptomatic COVID-19 virus (Coronavirus) by PCR In process     Emergency Department Course:    Reviewed:  0935 I reviewed the patient's nursing notes, vitals, past medical records, Care Everywhere.     Assessments:  1030 I performed an exam of the patient as documented above.     Consults:   1037 I spoke with Dr. Diana from the ENDO service at [facility] regarding the patient's symptoms and plan for treatment.    Interventions:  0948 NS 1000 mL IV  0949 Cardizem 10 mg  IV  1017 Cardizem 10 mg IV  1113 Cardizem 10 mg IV    Disposition:  The patient was admitted to the hospital under the care of Dr. Rosaline DO.     Impression & Plan   Covid-19  Rashawn Short was evaluated during a global COVID-19 pandemic, which necessitated consideration that the patient might be at risk for infection with the SARS-CoV-2 virus that causes COVID-19. Applicable protocols for evaluation were followed during the patient's care. COVID-19 was considered as part of the patient's evaluation. The plan for testing is: a test was obtained during this visit.     Medical Decision Making:  Rashawn Short is a 60 year old male who presents for evaluation of Afib.  This is consistent with atrial fibrillation with rapid ventricular response.  Based on chronicity of symptoms and unclear nature of onset, elected to control rate instead of rhythm control with diltiazem bolus and drip. This was successful in controlling rate. I think less likely this is acute coronary syndrome, thyroid issues, PE, dissection, drug ingestion, acute electrolyte imbalance, etc.  Will admit to medicine, telemetry, for further cares.      Diagnosis:    ICD-10-CM    1. Atrial fibrillation with RVR (H)  I48.91 Magnesium     Phosphorus     Asymptomatic SARS-CoV-2 COVID-19 Virus (Coronavirus) by PCR     Disposition: Admit    Critical Care Time: 60 minutes     Heriberto Patrick MD  12/28/20 9181

## 2020-12-28 NOTE — PROGRESS NOTES
RECEIVING UNIT ED HANDOFF REVIEW    ED Nurse Handoff Report was reviewed by: Eleanor Bragg RN on December 28, 2020 at 12:08 PM

## 2020-12-28 NOTE — OR NURSING
Pt has rapid afib when placed on ekg monitor in proc room, Dr Diana orders ekg, fluids. Placed on O2. BP elevated and did not take meds today.

## 2020-12-28 NOTE — PROGRESS NOTES
CRS Staff  61 yo M here for screening colonoscopy. Has a history of a fib.  Also history of EtOH abuse and HTN.  On lisinopril and metoprolol.  Didn't take them today.   Feels fine, but on the monitor his HR fluctuates between 110-160.  EKG obtained shows a fib with non-specific ST changes, rate 151.    A: a fib without rate or rhythm control, stable BP, asymptomatic.    P: I advised patient I didn't feel comfortable proceeding with conscious sedation with this HR so high.  Advised he go to ED for evaluation of the HR and ST changes.  Advised him that if his rate can be controlled, its possible we may be able to do his procedure later today and tomorrow.  Will follow up after his ED evaluation.    Moises Diana MD FACS FASCRS  Colorectal Surgeon       [facility]  [address], [address]  [place] [address]  T: [phone]  F: [phone]  Pager: [phone]  [email or web address]

## 2020-12-28 NOTE — ED NOTES
"[facility]  ED Nurse Handoff Report    ED Chief complaint: Atrial Fib      ED Diagnosis:   Final diagnoses:   None       Code Status: Full Code    Allergies: No Known Allergies    Patient Story: In ENDO for colonoscopy and staff found him to be in rapid afib.  Diltiazem and fluids given in ER.  Focused Assessment:  A/O x4.  Feels well.  Denies pain.    Treatments and/or interventions provided: See above  Patient's response to treatments and/or interventions: resting    To be done/followed up on inpatient unit:  VS and manage diltiazem gtt.    Does this patient have any cognitive concerns?: A/O x4    Activity level - Baseline/Home:  Independent  Activity Level - Current:   Independent    Patient's Preferred language: English   Needed?: No    Isolation: None  Infection: Not Applicable  Patient tested for COVID 19 prior to admission: YES  Bariatric?: No    Vital Signs:   Vitals:    12/28/20 0935 12/28/20 0936   BP: (!) 171/123    Pulse: 172    Resp:  20   Temp:  97.8  F (36.6  C)   TempSrc:  Oral   SpO2:  99%   Weight:  94.3 kg (208 lb)   Height:  1.778 m (5' 10\")       Cardiac Rhythm:     Was the PSS-3 completed:   Yes  What interventions are required if any?               Family Comments: none  OBS brochure/video discussed/provided to patient/family: No              Name of person given brochure if not patient: na              Relationship to patient: na    For the majority of the shift this patient's behavior was Green.   Behavioral interventions performed were encouragement.    ED NURSE PHONE NUMBER: [phone]         "

## 2020-12-29 ENCOUNTER — APPOINTMENT (OUTPATIENT)
Dept: CARDIOLOGY | Facility: CLINIC | Age: 60
End: 2020-12-29
Attending: INTERNAL MEDICINE
Payer: COMMERCIAL

## 2020-12-29 VITALS
HEIGHT: 70 IN | OXYGEN SATURATION: 96 % | BODY MASS INDEX: 31.31 KG/M2 | TEMPERATURE: 97.9 F | SYSTOLIC BLOOD PRESSURE: 152 MMHG | DIASTOLIC BLOOD PRESSURE: 101 MMHG | WEIGHT: 218.7 LBS | HEART RATE: 90 BPM | RESPIRATION RATE: 16 BRPM

## 2020-12-29 LAB
ANION GAP SERPL CALCULATED.3IONS-SCNC: 3 MMOL/L (ref 3–14)
BUN SERPL-MCNC: 15 MG/DL (ref 7–30)
CALCIUM SERPL-MCNC: 8.6 MG/DL (ref 8.5–10.1)
CHLORIDE SERPL-SCNC: 108 MMOL/L (ref 94–109)
CO2 SERPL-SCNC: 26 MMOL/L (ref 20–32)
CREAT SERPL-MCNC: 0.98 MG/DL (ref 0.66–1.25)
ERYTHROCYTE [DISTWIDTH] IN BLOOD BY AUTOMATED COUNT: 13.7 % (ref 10–15)
GFR SERPL CREATININE-BSD FRML MDRD: 82 ML/MIN/{1.73_M2}
GLUCOSE SERPL-MCNC: 86 MG/DL (ref 70–99)
HCT VFR BLD AUTO: 49.8 % (ref 40–53)
HGB BLD-MCNC: 16.5 G/DL (ref 13.3–17.7)
MCH RBC QN AUTO: 29.5 PG (ref 26.5–33)
MCHC RBC AUTO-ENTMCNC: 33.1 G/DL (ref 31.5–36.5)
MCV RBC AUTO: 89 FL (ref 78–100)
PHOSPHATE SERPL-MCNC: 2.3 MG/DL (ref 2.5–4.5)
PLATELET # BLD AUTO: 239 10E9/L (ref 150–450)
POTASSIUM SERPL-SCNC: 4.1 MMOL/L (ref 3.4–5.3)
RBC # BLD AUTO: 5.6 10E12/L (ref 4.4–5.9)
SODIUM SERPL-SCNC: 137 MMOL/L (ref 133–144)
WBC # BLD AUTO: 8.2 10E9/L (ref 4–11)

## 2020-12-29 PROCEDURE — 36415 COLL VENOUS BLD VENIPUNCTURE: CPT | Performed by: INTERNAL MEDICINE

## 2020-12-29 PROCEDURE — 250N000013 HC RX MED GY IP 250 OP 250 PS 637: Performed by: INTERNAL MEDICINE

## 2020-12-29 PROCEDURE — G0378 HOSPITAL OBSERVATION PER HR: HCPCS

## 2020-12-29 PROCEDURE — 80048 BASIC METABOLIC PNL TOTAL CA: CPT | Performed by: INTERNAL MEDICINE

## 2020-12-29 PROCEDURE — 84100 ASSAY OF PHOSPHORUS: CPT | Performed by: INTERNAL MEDICINE

## 2020-12-29 PROCEDURE — 85027 COMPLETE CBC AUTOMATED: CPT | Performed by: INTERNAL MEDICINE

## 2020-12-29 PROCEDURE — 99207 PR CDG-CODE CATEGORY CHANGED: CPT | Performed by: INTERNAL MEDICINE

## 2020-12-29 PROCEDURE — 99239 HOSP IP/OBS DSCHRG MGMT >30: CPT | Performed by: INTERNAL MEDICINE

## 2020-12-29 PROCEDURE — 0298T LEADLESS EKG MONITOR 3 TO 14 DAYS: CPT | Performed by: INTERNAL MEDICINE

## 2020-12-29 PROCEDURE — 0296T LEADLESS EKG MONITOR 3 TO 14 DAYS: CPT

## 2020-12-29 RX ORDER — METOPROLOL SUCCINATE 25 MG/1
25 TABLET, EXTENDED RELEASE ORAL ONCE
Status: COMPLETED | OUTPATIENT
Start: 2020-12-29 | End: 2020-12-29

## 2020-12-29 RX ORDER — METOPROLOL SUCCINATE 25 MG/1
75 TABLET, EXTENDED RELEASE ORAL 2 TIMES DAILY
Qty: 180 TABLET | Refills: 0 | Status: SHIPPED | OUTPATIENT
Start: 2020-12-29 | End: 2021-02-12

## 2020-12-29 RX ADMIN — METOPROLOL SUCCINATE 50 MG: 50 TABLET, EXTENDED RELEASE ORAL at 09:15

## 2020-12-29 RX ADMIN — ASPIRIN 325 MG: 325 TABLET, COATED ORAL at 09:15

## 2020-12-29 RX ADMIN — POTASSIUM & SODIUM PHOSPHATES POWDER PACK 280-160-250 MG 1 PACKET: 280-160-250 PACK at 09:15

## 2020-12-29 RX ADMIN — METOPROLOL SUCCINATE 25 MG: 25 TABLET, EXTENDED RELEASE ORAL at 11:42

## 2020-12-29 ASSESSMENT — ACTIVITIES OF DAILY LIVING (ADL)
ADLS_ACUITY_SCORE: 11

## 2020-12-29 ASSESSMENT — MIFFLIN-ST. JEOR: SCORE: 1803.27

## 2020-12-29 NOTE — PROGRESS NOTES
Called [facility] office, they do not have availability for a colonoscopy today but they will call the patient to reschedule.

## 2020-12-29 NOTE — UTILIZATION REVIEW
"  Admission Status; Secondary Review Determination         Under the authority of the Utilization Management Committee, the utilization review process indicated a secondary review on the above patient.  The review outcome is based on review of the medical records, discussions with staff, and applying clinical experience noted on the date of the review.          (x) Observation Status Appropriate - This patient does not meet hospital inpatient criteria and is placed in observation status. If this patient's primary payer is Medicare and was admitted as an inpatient, Condition Code 44 should be used and patient status changed to \"observation\".     RATIONALE FOR DETERMINATION   60 year old male with PMHx of hypertension, remote afib in 2017 for which he took metoprolol and hx of alcohol use presently 3 years sober who was admitted for evaluation of afib with RVR which was incidentally noted prior to an outpatient colonoscopy.    Was started on diltiazem gtt. Initial workup unrevealing. Admitted to [facility]. Serial trops neg. TSH nl. Echo showed EF 55% with borderline global LV hypokinesia, no significant valve disease. Transitioned from dilt gtt to metoprolol XL. HRs improved to 80s - low 100s. Does titrated to 75mg BID with improvement. Discharged with cardiac monitor     The severity of illness, intensity of service provided, expected LOS and risk for adverse outcome make the care appropriate for further observation; however, doesn't meet criteria for hospital inpatient admission. Dr Mendoza notified of this determination.    This document was produced using voice recognition software.      The information on this document is developed by the utilization review team in order for the business office to ensure compliance.  This only denotes the appropriateness of proper admission status and does not reflect the quality of care rendered.         The definitions of Inpatient Status and Observation Status used in making the " determination above are those provided in the CMS Coverage Manual, Chapter 1 and Chapter 6, section 70.4.      Sincerely,     LESLIE KEEN MD    System Medical Director  Utilization Management  [facility].

## 2020-12-29 NOTE — PLAN OF CARE
A&Ox4. VSS on RA ex elevated BP at times. HR mostly in 60-80's overnight. Tele: AFib CVR with occasional PVC's & PAC's. Up with SBA. Clear liquid diet, NPO since midnight. Denies pain. Voiding bathroom. IV-SL. Plan for possible colonoscopy today. Discharge pending. Continue to monitor.

## 2020-12-29 NOTE — PROGRESS NOTES
A/O, VSS ex DBP slightly elevated. O2sats 97% on RA. Tele afib RVR 90s-120s in AM, improved to 90s-low 100s in afternoon prior to dischrage. Metoprolol XR increased to 75 mg twice daily, plan to hold lisinopril and hydrochlorothiazide. All discharge instructions, prescriptions, and personal belongings given to pt. All questions answered. Aware he needs to purchase BP cuff. Ziopatch in place. Pt d/c to home via family.

## 2020-12-29 NOTE — PLAN OF CARE
A/O, VSS ex BP elevated at times, tele afib RVR with PVCs initially 90s-120s, currently improved to 60s-100s, diltiazem 10 mg/hr now off, PO metoprolol given. O2sats 98% on RA. Denies pain. SBA, steady. Phosphorus recheck 2.8, troponins negative. Clear liquid diet. Plan to monitor HR closely.

## 2020-12-31 LAB — INTERPRETATION ECG - MUSE: NORMAL

## 2021-01-05 ENCOUNTER — MEDICAL CORRESPONDENCE (OUTPATIENT)
Dept: HEALTH INFORMATION MANAGEMENT | Facility: CLINIC | Age: 61
End: 2021-01-05

## 2021-01-06 ENCOUNTER — OFFICE VISIT (OUTPATIENT)
Dept: CARDIOLOGY | Facility: CLINIC | Age: 61
End: 2021-01-06
Payer: COMMERCIAL

## 2021-01-06 VITALS
BODY MASS INDEX: 32.28 KG/M2 | DIASTOLIC BLOOD PRESSURE: 85 MMHG | WEIGHT: 225 LBS | OXYGEN SATURATION: 93 % | SYSTOLIC BLOOD PRESSURE: 149 MMHG | HEART RATE: 58 BPM

## 2021-01-06 DIAGNOSIS — I48.0 PAROXYSMAL ATRIAL FIBRILLATION (H): Primary | ICD-10-CM

## 2021-01-06 DIAGNOSIS — Z11.59 ENCOUNTER FOR SCREENING FOR OTHER VIRAL DISEASES: ICD-10-CM

## 2021-01-06 PROCEDURE — 99204 OFFICE O/P NEW MOD 45 MIN: CPT | Mod: 25 | Performed by: INTERNAL MEDICINE

## 2021-01-06 PROCEDURE — 93000 ELECTROCARDIOGRAM COMPLETE: CPT | Performed by: INTERNAL MEDICINE

## 2021-01-06 RX ORDER — AMIODARONE HYDROCHLORIDE 200 MG/1
200 TABLET ORAL DAILY
Qty: 120 TABLET | Refills: 3 | Status: SHIPPED | OUTPATIENT
Start: 2021-01-06 | End: 2021-07-08

## 2021-01-06 NOTE — LETTER
1/6/2021    Ludwig Carrillo MD  [address]  [facility] [address]    RE: Rashawn Short       Dear Colleague,    I had the pleasure of seeing Rashawn Short in the [facility].    HPI and Plan:   Today I had the pleasure of seeing Rashawn Short at [facility]. He is a pleasant 60 year old patient with no significant past cardiac medical history presents with clinic in consultation for a new emesis of atrial fibrillation.  The patient recently presented for a colonoscopy and was noted to have irregular rate on telemetry.  He was diagnosed with atrial fibrillation, started on metoprolol and asked to see cardiologist.  Zio patch was also prescribed which he currently is wearing.    He tells me that he has felt his heart beating abnormally and irregularly for a long time.  He has never taken this seriously has he has never been symptomatic because of this.  He believes he feels even better since starting metoprolol.  He takes 325 mg of aspirin but is not on anticoagulation.  He denies chest pain, shortness of breath, orthopnea, PND, syncope or presyncope.  As well as cardiac work-up is concerned he had a transthoracic echocardiogram on 12/28/2020 which showed normal biventricular size and systolic function and only mild dilated left atrium.    Assessment and plan  1.  Persistent atrial fibrillation, [id number] score of 1 for hypertension  2.  BMI of 33  3.  Hypertension    The patient is in atrial fibrillation today on the EKG.  Since this is his first episode I would like to cardiovert him to see if we can maintain sinus rhythm.  I will start him on amiodarone and anticoagulation and perform a FREDI guided cardioversion in 7 to 10 days.  I will continue anticoagulation going forward.  In 1-2 months time I will perform a Holter monitor/event monitor to document recurrence of atrial fibrillation.  If there is no recurrence I will have a discussion with him  regarding discontinuation of anticoagulation and amiodarone.  If there is no recurrence and he accepts the risk I will discontinue both these medications and switch him to aspirin.  I discussed the plan in great detail with him and he is in agreement.  During our next appointment in 3 months I will talk to him about the risk factors of atrial fibrillation and the things he can do to improve his outcome.    Assessment and plan  1.  Start amiodarone 400 mg daily for 1 week followed by 200 mg daily  2.  Start Xarelto 20 mg p.o. daily and continue unless stopped by your physician  3.  FREDI guided cardioversion in 1 week  4.  Rhythm monitor in 1 month and follow-up with me in 3 months  5.  Stop aspirin    Thank you for allowing me to participate in the care of Rashawn Short    This note was completed in part using Dragon voice recognition software. Although reviewed after completion, some word and grammatical errors may occur.    Austin Hercules MD  Cardiology    Orders Placed This Encounter   Procedures     Follow-Up with Cardiologist     EKG 12-lead complete w/read - Clinics (performed today)     Holter Monitor 48 hour Adult Pediatric     Cardioversion     Transesophageal Echocardiogram       Orders Placed This Encounter   Medications     rivaroxaban ANTICOAGULANT (XARELTO) 20 MG TABS tablet     Sig: Take 1 tablet (20 mg) by mouth daily (with dinner)     Dispense:  90 tablet     Refill:  3     amiodarone (PACERONE) 200 MG tablet     Sig: Take 1 tablet (200 mg) by mouth daily     Dispense:  120 tablet     Refill:  3     Take 2 tablets daily for 7 days and then one table daily.       Medications Discontinued During This Encounter   Medication Reason     aspirin 325 MG EC tablet Alternate therapy       Encounter Diagnosis   Name Primary?     Paroxysmal atrial fibrillation (H) Yes       CURRENT MEDICATIONS:  Current Outpatient Medications   Medication Sig Dispense Refill     amiodarone (PACERONE) 200 MG tablet Take 1 tablet  (200 mg) by mouth daily 120 tablet 3     fish oil-omega-3 fatty acids 1000 MG capsule Take 1 g by mouth daily       metoprolol succinate ER (TOPROL-XL) 25 MG 24 hr tablet Take 3 tablets (75 mg) by mouth 2 times daily 180 tablet 0     multivitamin, therapeutic (THERA-VIT) TABS tablet Take 1 tablet by mouth daily       rivaroxaban ANTICOAGULANT (XARELTO) 20 MG TABS tablet Take 1 tablet (20 mg) by mouth daily (with dinner) 90 tablet 3     thiamine (B-1) 100 MG tablet Take 100 mg by mouth daily         ALLERGIES   No Known Allergies    PAST MEDICAL HISTORY:  Past Medical History:   Diagnosis Date     Atrial fibrillation with RVR (H) 12/28/2020     Depressive disorder      EtOH dependence (H)      Hypertension      Paroxysmal atrial fibrillation (H) 01/29/2010       PAST SURGICAL HISTORY:  Past Surgical History:   Procedure Laterality Date     ORTHOPEDIC SURGERY  1998    ACL Right knee reconstruction-        FAMILY HISTORY:  Family History   Problem Relation Age of Onset     Arrhythmia Father         a-fib     C.A.D. No family hx of      Diabetes No family hx of      Hypertension No family hx of      Cerebrovascular Disease No family hx of      Breast Cancer No family hx of      Cancer - colorectal No family hx of      Prostate Cancer No family hx of        SOCIAL HISTORY:  Social History     Socioeconomic History     Marital status:      Spouse name: None     Number of children: None     Years of education: None     Highest education level: None   Occupational History     None   Social Needs     Financial resource strain: None     Food insecurity     Worry: None     Inability: None     Transportation needs     Medical: None     Non-medical: None   Tobacco Use     Smoking status: Never Smoker     Smokeless tobacco: Never Used   Substance and Sexual Activity     Alcohol use: Not Currently     Comment: no alcolhol 2017     Drug use: No     Sexual activity: Yes     Partners: Female   Lifestyle     Physical activity      Days per week: None     Minutes per session: None     Stress: None   Relationships     Social connections     Talks on phone: None     Gets together: None     Attends [employer] service: None     Active member of club or organization: None     Attends meetings of clubs or organizations: None     Relationship status: None     Intimate partner violence     Fear of current or ex partner: None     Emotionally abused: None     Physically abused: None     Forced sexual activity: None   Other Topics Concern     Parent/sibling w/ CABG, MI or angioplasty before 65F 55M? Not Asked   Social History Narrative     None       Review of Systems:  Skin:  Negative       Eyes:  Positive for   reading glasses  ENT:  Negative      Respiratory:  Negative       Cardiovascular:  Negative;chest pain;palpitations;cyanosis;syncope or near-syncope;exercise intolerance;lightheadedness;edema;fatigue      Gastroenterology: Negative      Genitourinary:  not assessed      Musculoskeletal:  Negative      Neurologic:  Negative      Psychiatric:  Negative      Heme/Lymph/Imm:  Negative      Endocrine:  Negative        Physical Exam:  Vitals: BP (!) 149/85   Pulse 58   Wt 102.1 kg (225 lb)   SpO2 93%   BMI 32.28 kg/m    Constitutional: awake, alert, no distress  Head: Normocephalic, atraumatic  Eyes: Conjunctivae and lids unremarkable, sclera white  ENT: No pallor or cyanosis  Respiratory: Good chest movement, no distress  Cardiac: Irregularly irregular rate, S1-S2 normal. No murmurs gallops or rubs.   No pedal edema.   Extremities and musculoskeletal: No gross motor deficit  Neurological.  Affect normal  Psych: Alert and oriented x3    Recent Lab Results:  LIPID RESULTS:  Lab Results   Component Value Date    CHOL 208 (H) 03/02/2005    HDL 61 03/02/2005     03/02/2005    TRIG 112 03/02/2005    CHOLHDLRATIO 3.4 03/02/2005       LIVER ENZYME RESULTS:  Lab Results   Component Value Date    AST 50 (H) 12/28/2020    ALT 98 (H) 12/28/2020        CBC RESULTS:  Lab Results   Component Value Date    WBC 8.2 12/29/2020    RBC 5.60 12/29/2020    HGB 16.5 12/29/2020    HCT 49.8 12/29/2020    MCV 89 12/29/2020    MCH 29.5 12/29/2020    MCHC 33.1 12/29/2020    RDW 13.7 12/29/2020     12/29/2020       BMP RESULTS:  Lab Results   Component Value Date     12/29/2020    POTASSIUM 4.1 12/29/2020    CHLORIDE 108 12/29/2020    CO2 26 12/29/2020    ANIONGAP 3 12/29/2020    GLC 86 12/29/2020    BUN 15 12/29/2020    CR 0.98 12/29/2020    GFRESTIMATED 82 12/29/2020    GFRESTBLACK >90 12/29/2020    KELSEY 8.6 12/29/2020        A1C RESULTS:  No results found for: A1C    INR RESULTS:  Lab Results   Component Value Date    INR 0.98 12/28/2020    INR 0.94 01/27/2010       All medical records were reviewed in detail and discussed with the patient. Greater than 30 mins were spent with the patient, 50% of this time was spent on counseling and coordination of care.  After visit summary was printed and given to the patient.        Thank you for allowing me to participate in the care of your patient.    Sincerely,     Austin Hercules MD     [facility]

## 2021-01-07 NOTE — PROGRESS NOTES
HPI and Plan:   Today I had the pleasure of seeing Rashawn Short at [facility]. He is a pleasant 60 year old patient with no significant past cardiac medical history presents with clinic in consultation for a new emesis of atrial fibrillation.  The patient recently presented for a colonoscopy and was noted to have irregular rate on telemetry.  He was diagnosed with atrial fibrillation, started on metoprolol and asked to see cardiologist.  Zio patch was also prescribed which he currently is wearing.    He tells me that he has felt his heart beating abnormally and irregularly for a long time.  He has never taken this seriously has he has never been symptomatic because of this.  He believes he feels even better since starting metoprolol.  He takes 325 mg of aspirin but is not on anticoagulation.  He denies chest pain, shortness of breath, orthopnea, PND, syncope or presyncope.  As well as cardiac work-up is concerned he had a transthoracic echocardiogram on 12/28/2020 which showed normal biventricular size and systolic function and only mild dilated left atrium.    Assessment and plan  1.  Persistent atrial fibrillation, [id number] score of 1 for hypertension  2.  BMI of 33  3.  Hypertension    The patient is in atrial fibrillation today on the EKG.  Since this is his first episode I would like to cardiovert him to see if we can maintain sinus rhythm.  I will start him on amiodarone and anticoagulation and perform a FREDI guided cardioversion in 7 to 10 days.  I will continue anticoagulation going forward.  In 1-2 months time I will perform a Holter monitor/event monitor to document recurrence of atrial fibrillation.  If there is no recurrence I will have a discussion with him regarding discontinuation of anticoagulation and amiodarone.  If there is no recurrence and he accepts the risk I will discontinue both these medications and switch him to aspirin.  I discussed the plan in great detail  with him and he is in agreement.  During our next appointment in 3 months I will talk to him about the risk factors of atrial fibrillation and the things he can do to improve his outcome.    Assessment and plan  1.  Start amiodarone 400 mg daily for 1 week followed by 200 mg daily  2.  Start Xarelto 20 mg p.o. daily and continue unless stopped by your physician  3.  FREDI guided cardioversion in 1 week  4.  Rhythm monitor in 1 month and follow-up with me in 3 months  5.  Stop aspirin    Thank you for allowing me to participate in the care of Rashawn Short    This note was completed in part using Dragon voice recognition software. Although reviewed after completion, some word and grammatical errors may occur.    Austin Hercules MD  Cardiology    Orders Placed This Encounter   Procedures     Follow-Up with Cardiologist     EKG 12-lead complete w/read - Clinics (performed today)     Holter Monitor 48 hour Adult Pediatric     Cardioversion     Transesophageal Echocardiogram       Orders Placed This Encounter   Medications     rivaroxaban ANTICOAGULANT (XARELTO) 20 MG TABS tablet     Sig: Take 1 tablet (20 mg) by mouth daily (with dinner)     Dispense:  90 tablet     Refill:  3     amiodarone (PACERONE) 200 MG tablet     Sig: Take 1 tablet (200 mg) by mouth daily     Dispense:  120 tablet     Refill:  3     Take 2 tablets daily for 7 days and then one table daily.       Medications Discontinued During This Encounter   Medication Reason     aspirin 325 MG EC tablet Alternate therapy       Encounter Diagnosis   Name Primary?     Paroxysmal atrial fibrillation (H) Yes       CURRENT MEDICATIONS:  Current Outpatient Medications   Medication Sig Dispense Refill     amiodarone (PACERONE) 200 MG tablet Take 1 tablet (200 mg) by mouth daily 120 tablet 3     fish oil-omega-3 fatty acids 1000 MG capsule Take 1 g by mouth daily       metoprolol succinate ER (TOPROL-XL) 25 MG 24 hr tablet Take 3 tablets (75 mg) by mouth 2 times daily  180 tablet 0     multivitamin, therapeutic (THERA-VIT) TABS tablet Take 1 tablet by mouth daily       rivaroxaban ANTICOAGULANT (XARELTO) 20 MG TABS tablet Take 1 tablet (20 mg) by mouth daily (with dinner) 90 tablet 3     thiamine (B-1) 100 MG tablet Take 100 mg by mouth daily         ALLERGIES   No Known Allergies    PAST MEDICAL HISTORY:  Past Medical History:   Diagnosis Date     Atrial fibrillation with RVR (H) 12/28/2020     Depressive disorder      EtOH dependence (H)      Hypertension      Paroxysmal atrial fibrillation (H) 01/29/2010       PAST SURGICAL HISTORY:  Past Surgical History:   Procedure Laterality Date     ORTHOPEDIC SURGERY  1998    ACL Right knee reconstruction-        FAMILY HISTORY:  Family History   Problem Relation Age of Onset     Arrhythmia Father         a-fib     C.A.D. No family hx of      Diabetes No family hx of      Hypertension No family hx of      Cerebrovascular Disease No family hx of      Breast Cancer No family hx of      Cancer - colorectal No family hx of      Prostate Cancer No family hx of        SOCIAL HISTORY:  Social History     Socioeconomic History     Marital status:      Spouse name: None     Number of children: None     Years of education: None     Highest education level: None   Occupational History     None   Social Needs     Financial resource strain: None     Food insecurity     Worry: None     Inability: None     Transportation needs     Medical: None     Non-medical: None   Tobacco Use     Smoking status: Never Smoker     Smokeless tobacco: Never Used   Substance and Sexual Activity     Alcohol use: Not Currently     Comment: no alcolhol 2017     Drug use: No     Sexual activity: Yes     Partners: Female   Lifestyle     Physical activity     Days per week: None     Minutes per session: None     Stress: None   Relationships     Social connections     Talks on phone: None     Gets together: None     Attends Jew service: None     Active member of  club or organization: None     Attends meetings of clubs or organizations: None     Relationship status: None     Intimate partner violence     Fear of current or ex partner: None     Emotionally abused: None     Physically abused: None     Forced sexual activity: None   Other Topics Concern     Parent/sibling w/ CABG, MI or angioplasty before 65F 55M? Not Asked   Social History Narrative     None       Review of Systems:  Skin:  Negative       Eyes:  Positive for   reading glasses  ENT:  Negative      Respiratory:  Negative       Cardiovascular:  Negative;chest pain;palpitations;cyanosis;syncope or near-syncope;exercise intolerance;lightheadedness;edema;fatigue      Gastroenterology: Negative      Genitourinary:  not assessed      Musculoskeletal:  Negative      Neurologic:  Negative      Psychiatric:  Negative      Heme/Lymph/Imm:  Negative      Endocrine:  Negative        Physical Exam:  Vitals: BP (!) 149/85   Pulse 58   Wt 102.1 kg (225 lb)   SpO2 93%   BMI 32.28 kg/m    Constitutional: awake, alert, no distress  Head: Normocephalic, atraumatic  Eyes: Conjunctivae and lids unremarkable, sclera white  ENT: No pallor or cyanosis  Respiratory: Good chest movement, no distress  Cardiac: Irregularly irregular rate, S1-S2 normal. No murmurs gallops or rubs.   No pedal edema.   Extremities and musculoskeletal: No gross motor deficit  Neurological.  Affect normal  Psych: Alert and oriented x3    Recent Lab Results:  LIPID RESULTS:  Lab Results   Component Value Date    CHOL 208 (H) 03/02/2005    HDL 61 03/02/2005     03/02/2005    TRIG 112 03/02/2005    CHOLHDLRATIO 3.4 03/02/2005       LIVER ENZYME RESULTS:  Lab Results   Component Value Date    AST 50 (H) 12/28/2020    ALT 98 (H) 12/28/2020       CBC RESULTS:  Lab Results   Component Value Date    WBC 8.2 12/29/2020    RBC 5.60 12/29/2020    HGB 16.5 12/29/2020    HCT 49.8 12/29/2020    MCV 89 12/29/2020    MCH 29.5 12/29/2020    MCHC 33.1 12/29/2020     RDW 13.7 12/29/2020     12/29/2020       BMP RESULTS:  Lab Results   Component Value Date     12/29/2020    POTASSIUM 4.1 12/29/2020    CHLORIDE 108 12/29/2020    CO2 26 12/29/2020    ANIONGAP 3 12/29/2020    GLC 86 12/29/2020    BUN 15 12/29/2020    CR 0.98 12/29/2020    GFRESTIMATED 82 12/29/2020    GFRESTBLACK >90 12/29/2020    KELSEY 8.6 12/29/2020        A1C RESULTS:  No results found for: A1C    INR RESULTS:  Lab Results   Component Value Date    INR 0.98 12/28/2020    INR 0.94 01/27/2010       CC  No referring provider defined for this encounter.    All medical records were reviewed in detail and discussed with the patient. Greater than 30 mins were spent with the patient, 50% of this time was spent on counseling and coordination of care.  After visit summary was printed and given to the patient.

## 2021-01-08 ENCOUNTER — TELEPHONE (OUTPATIENT)
Dept: CARDIOLOGY | Facility: CLINIC | Age: 61
End: 2021-01-08

## 2021-01-08 DIAGNOSIS — Z11.59 ENCOUNTER FOR SCREENING FOR OTHER VIRAL DISEASES: ICD-10-CM

## 2021-01-08 LAB
SARS-COV-2 RNA RESP QL NAA+PROBE: NORMAL
SPECIMEN SOURCE: NORMAL

## 2021-01-08 PROCEDURE — U0005 INFEC AGEN DETEC AMPLI PROBE: HCPCS | Performed by: INTERNAL MEDICINE

## 2021-01-08 PROCEDURE — U0003 INFECTIOUS AGENT DETECTION BY NUCLEIC ACID (DNA OR RNA); SEVERE ACUTE RESPIRATORY SYNDROME CORONAVIRUS 2 (SARS-COV-2) (CORONAVIRUS DISEASE [COVID-19]), AMPLIFIED PROBE TECHNIQUE, MAKING USE OF HIGH THROUGHPUT TECHNOLOGIES AS DESCRIBED BY CMS-2020-01-R: HCPCS | Performed by: INTERNAL MEDICINE

## 2021-01-08 NOTE — TELEPHONE ENCOUNTER
Wellness Screening Tool    Symptom Screening:    Do you have one of the following NEW symptoms:      Fever (subjective or >100.0)?  No    New cough? No    Shortness of breath? No    Chills? No    New loss of taste or smell? No    Generalized body aches? No    New persistent headache? No    New sore throat? No    Nausea, vomiting or diarrhea? No    Within the past 2 weeks, have you been exposed to someone with a known positive illness below?      COVID - 19 (known or suspected) No    Chicken pox?  No    Measles? No    Pertussis? No    Have you had a positive COVID-19 diagnostic test (nasal swab test) in the last 14 days or are you currently   on self-quarantine restrictions (i.e.travel restriction, exposure, etc?) No        Patient notified of visitor restriction: Yes  Patient informed to wear a mask: Yes    Patient's appointment status: Patient will be seen in clinic as scheduled on 1-  No travel outside [place] in 14 days patient NPO after midnight and wife will drive patient home.

## 2021-01-09 LAB
LABORATORY COMMENT REPORT: NORMAL
SARS-COV-2 RNA RESP QL NAA+PROBE: NEGATIVE
SPECIMEN SOURCE: NORMAL

## 2021-01-10 ENCOUNTER — ANESTHESIA EVENT (OUTPATIENT)
Dept: SURGERY | Facility: CLINIC | Age: 61
End: 2021-01-10
Payer: COMMERCIAL

## 2021-01-10 ASSESSMENT — ENCOUNTER SYMPTOMS: DYSRHYTHMIAS: 1

## 2021-01-10 NOTE — ANESTHESIA PREPROCEDURE EVALUATION
Anesthesia Pre-Procedure Evaluation    Patient: Rashawn Short   MRN: [id number] : 1960          Preoperative Diagnosis: Atrial fibrillation (H) [I48.91]    Procedure(s):  ANESTHESIA, FOR CARDIOVERSION    Past Medical History:   Diagnosis Date     Atrial fibrillation with RVR (H) 2020     Depressive disorder      EtOH dependence (H)      Hypertension      Paroxysmal atrial fibrillation (H) 2010     Past Surgical History:   Procedure Laterality Date     ORTHOPEDIC SURGERY      ACL Right knee reconstruction-        Anesthesia Evaluation     . Pt has had prior anesthetic.     No history of anesthetic complications          ROS/MED HX    ENT/Pulmonary:      (-) sleep apnea   Neurologic:       Cardiovascular:     (+) hypertension----. : . . . :. dysrhythmias a-fib, . Previous cardiac testing Echodate:20results:Normal EF. No significant valve diseasedate: results: date: results: date: results:          METS/Exercise Tolerance:     Hematologic:         Musculoskeletal:         GI/Hepatic:        (-) GERD   Renal/Genitourinary:         Endo:     (+) Obesity (BMI 33), .      Psychiatric:     (+) psychiatric history (ho EtOH abuse) depression      Infectious Disease:         Malignancy:         Other:                          Physical Exam  Normal systems: cardiovascular, pulmonary and dental    Airway   Mallampati: II  TM distance: >3 FB  Neck ROM: full    Dental     Cardiovascular       Pulmonary             Lab Results   Component Value Date    WBC 8.2 2020    HGB 16.5 2020    HCT 49.8 2020     2020     2020    POTASSIUM 4.1 2020    CHLORIDE 108 2020    CO2 26 2020    BUN 15 2020    CR 0.98 2020    GLC 86 2020    KELSEY 8.6 2020    PHOS 2.3 (L) 2020    MAG 2.6 (H) 2020    ALBUMIN 3.9 2020    PROTTOTAL 7.7 2020    ALT 98 (H) 2020    AST 50 (H) 2020    GGT 54 2017    ALKPHOS  "76 12/28/2020    BILITOTAL 0.6 12/28/2020    LIPASE 163 01/27/2010    PTT 26 01/27/2010    INR 0.98 12/28/2020    TSH 3.39 12/28/2020       Preop Vitals  BP Readings from Last 3 Encounters:   01/06/21 (!) 149/85   12/29/20 (!) 152/101   12/28/20 (!) 160/135    Pulse Readings from Last 3 Encounters:   01/06/21 58   12/29/20 90   12/28/20 143      Resp Readings from Last 3 Encounters:   12/29/20 16   12/28/20 16   07/28/19 16    SpO2 Readings from Last 3 Encounters:   01/06/21 93%   12/29/20 96%   12/28/20 100%      Temp Readings from Last 1 Encounters:   12/29/20 36.6  C (97.9  F) (Oral)    Ht Readings from Last 1 Encounters:   12/28/20 1.778 m (5' 10\")      Wt Readings from Last 1 Encounters:   01/06/21 102.1 kg (225 lb)    Estimated body mass index is 32.28 kg/m  as calculated from the following:    Height as of 12/28/20: 1.778 m (5' 10\").    Weight as of 1/6/21: 102.1 kg (225 lb).       Anesthesia Plan      History & Physical Review  History and physical reviewed and following examination; no interval change.    ASA Status:  3 .    NPO Status:  > 8 hours    Plan for General with Intravenous induction.   PONV prophylaxis:  Other (See comment)         Postoperative Care  Postoperative pain management:  IV analgesics.      Consents  Anesthetic plan, risks, benefits and alternatives discussed with:  Patient..                 Jose E Billy MD  "

## 2021-01-11 ENCOUNTER — HOSPITAL ENCOUNTER (OUTPATIENT)
Facility: CLINIC | Age: 61
Discharge: HOME | End: 2021-01-11
Attending: INTERNAL MEDICINE | Admitting: INTERNAL MEDICINE
Payer: COMMERCIAL

## 2021-01-11 ENCOUNTER — ANESTHESIA (OUTPATIENT)
Dept: SURGERY | Facility: CLINIC | Age: 61
End: 2021-01-11
Payer: COMMERCIAL

## 2021-01-11 ENCOUNTER — HOSPITAL ENCOUNTER (OUTPATIENT)
Dept: CARDIOLOGY | Facility: CLINIC | Age: 61
End: 2021-01-11
Attending: INTERNAL MEDICINE
Payer: COMMERCIAL

## 2021-01-11 VITALS
HEIGHT: 71 IN | WEIGHT: 218 LBS | BODY MASS INDEX: 30.52 KG/M2 | RESPIRATION RATE: 20 BRPM | DIASTOLIC BLOOD PRESSURE: 89 MMHG | TEMPERATURE: 98 F | OXYGEN SATURATION: 95 % | HEART RATE: 63 BPM | SYSTOLIC BLOOD PRESSURE: 127 MMHG

## 2021-01-11 DIAGNOSIS — I48.0 PAROXYSMAL ATRIAL FIBRILLATION (H): ICD-10-CM

## 2021-01-11 LAB
INR PPP: 1.74 (ref 0.86–1.14)
MAGNESIUM SERPL-MCNC: 2.3 MG/DL (ref 1.6–2.3)
POTASSIUM SERPL-SCNC: 3.7 MMOL/L (ref 3.4–5.3)

## 2021-01-11 PROCEDURE — 92960 CARDIOVERSION ELECTRIC EXT: CPT | Performed by: INTERNAL MEDICINE

## 2021-01-11 PROCEDURE — 250N000011 HC RX IP 250 OP 636: Performed by: INTERNAL MEDICINE

## 2021-01-11 PROCEDURE — 250N000013 HC RX MED GY IP 250 OP 250 PS 637: Performed by: INTERNAL MEDICINE

## 2021-01-11 PROCEDURE — 93005 ELECTROCARDIOGRAM TRACING: CPT

## 2021-01-11 PROCEDURE — 999N000184 HC STATISTIC TELEMETRY

## 2021-01-11 PROCEDURE — 999N000010 HC STATISTIC ANES STAT CODE-CRNA PER MINUTE

## 2021-01-11 PROCEDURE — 84132 ASSAY OF SERUM POTASSIUM: CPT | Performed by: INTERNAL MEDICINE

## 2021-01-11 PROCEDURE — 255N000002 HC RX 255 OP 636: Performed by: INTERNAL MEDICINE

## 2021-01-11 PROCEDURE — 99152 MOD SED SAME PHYS/QHP 5/>YRS: CPT | Performed by: INTERNAL MEDICINE

## 2021-01-11 PROCEDURE — 999N000208 ECHO TRANSEOPHAGEAL (TEE)

## 2021-01-11 PROCEDURE — 93312 ECHO TRANSESOPHAGEAL: CPT | Mod: 26 | Performed by: INTERNAL MEDICINE

## 2021-01-11 PROCEDURE — 93320 DOPPLER ECHO COMPLETE: CPT | Mod: 26 | Performed by: INTERNAL MEDICINE

## 2021-01-11 PROCEDURE — 258N000003 HC RX IP 258 OP 636: Performed by: INTERNAL MEDICINE

## 2021-01-11 PROCEDURE — 85610 PROTHROMBIN TIME: CPT | Performed by: INTERNAL MEDICINE

## 2021-01-11 PROCEDURE — 92960 CARDIOVERSION ELECTRIC EXT: CPT

## 2021-01-11 PROCEDURE — 93325 DOPPLER ECHO COLOR FLOW MAPG: CPT | Mod: 26 | Performed by: INTERNAL MEDICINE

## 2021-01-11 PROCEDURE — 80162 ASSAY OF DIGOXIN TOTAL: CPT | Performed by: INTERNAL MEDICINE

## 2021-01-11 PROCEDURE — 36415 COLL VENOUS BLD VENIPUNCTURE: CPT

## 2021-01-11 PROCEDURE — 250N000009 HC RX 250: Performed by: INTERNAL MEDICINE

## 2021-01-11 PROCEDURE — 370N000017 HC ANESTHESIA TECHNICAL FEE, PER MIN

## 2021-01-11 PROCEDURE — 83735 ASSAY OF MAGNESIUM: CPT | Performed by: INTERNAL MEDICINE

## 2021-01-11 PROCEDURE — 999N000183 HC STATISTIC TEE INCLUDES SEDATION

## 2021-01-11 PROCEDURE — 250N000011 HC RX IP 250 OP 636: Performed by: NURSE ANESTHETIST, CERTIFIED REGISTERED

## 2021-01-11 PROCEDURE — 93010 ELECTROCARDIOGRAM REPORT: CPT | Mod: 76 | Performed by: INTERNAL MEDICINE

## 2021-01-11 RX ORDER — NALOXONE HYDROCHLORIDE 0.4 MG/ML
0.2 INJECTION, SOLUTION INTRAMUSCULAR; INTRAVENOUS; SUBCUTANEOUS
Status: DISCONTINUED | OUTPATIENT
Start: 2021-01-11 | End: 2021-01-11 | Stop reason: HOSPADM

## 2021-01-11 RX ORDER — DEXTROSE MONOHYDRATE 25 G/50ML
9.5 INJECTION, SOLUTION INTRAVENOUS
Status: DISCONTINUED | OUTPATIENT
Start: 2021-01-11 | End: 2021-01-11 | Stop reason: HOSPADM

## 2021-01-11 RX ORDER — FENTANYL CITRATE 50 UG/ML
50 INJECTION, SOLUTION INTRAMUSCULAR; INTRAVENOUS ONCE
Status: COMPLETED | OUTPATIENT
Start: 2021-01-11 | End: 2021-01-11

## 2021-01-11 RX ORDER — POTASSIUM CHLORIDE 1500 MG/1
20 TABLET, EXTENDED RELEASE ORAL
Status: DISCONTINUED | OUTPATIENT
Start: 2021-01-11 | End: 2021-01-11 | Stop reason: HOSPADM

## 2021-01-11 RX ORDER — NALOXONE HYDROCHLORIDE 0.4 MG/ML
0.4 INJECTION, SOLUTION INTRAMUSCULAR; INTRAVENOUS; SUBCUTANEOUS
Status: DISCONTINUED | OUTPATIENT
Start: 2021-01-11 | End: 2021-01-11 | Stop reason: HOSPADM

## 2021-01-11 RX ORDER — ATROPINE SULFATE 0.1 MG/ML
.5-1 INJECTION INTRAVENOUS
Status: DISCONTINUED | OUTPATIENT
Start: 2021-01-11 | End: 2021-01-11 | Stop reason: HOSPADM

## 2021-01-11 RX ORDER — POTASSIUM CHLORIDE 1500 MG/1
40 TABLET, EXTENDED RELEASE ORAL
Status: DISCONTINUED | OUTPATIENT
Start: 2021-01-11 | End: 2021-01-11 | Stop reason: HOSPADM

## 2021-01-11 RX ORDER — FENTANYL CITRATE 50 UG/ML
25 INJECTION, SOLUTION INTRAMUSCULAR; INTRAVENOUS
Status: DISCONTINUED | OUTPATIENT
Start: 2021-01-11 | End: 2021-01-11 | Stop reason: HOSPADM

## 2021-01-11 RX ORDER — METOPROLOL TARTRATE 1 MG/ML
2.5-5 INJECTION, SOLUTION INTRAVENOUS
Status: DISCONTINUED | OUTPATIENT
Start: 2021-01-11 | End: 2021-01-11 | Stop reason: HOSPADM

## 2021-01-11 RX ORDER — PROPOFOL 10 MG/ML
INJECTION, EMULSION INTRAVENOUS PRN
Status: DISCONTINUED | OUTPATIENT
Start: 2021-01-11 | End: 2021-01-11

## 2021-01-11 RX ORDER — LIDOCAINE HYDROCHLORIDE 40 MG/ML
1.5 SOLUTION TOPICAL ONCE
Status: COMPLETED | OUTPATIENT
Start: 2021-01-11 | End: 2021-01-11

## 2021-01-11 RX ORDER — LIDOCAINE 50 MG/G
OINTMENT TOPICAL ONCE
Status: COMPLETED | OUTPATIENT
Start: 2021-01-11 | End: 2021-01-11

## 2021-01-11 RX ORDER — FLUMAZENIL 0.1 MG/ML
0.2 INJECTION, SOLUTION INTRAVENOUS
Status: DISCONTINUED | OUTPATIENT
Start: 2021-01-11 | End: 2021-01-11 | Stop reason: HOSPADM

## 2021-01-11 RX ORDER — GLYCOPYRROLATE 0.2 MG/ML
0.1 INJECTION, SOLUTION INTRAMUSCULAR; INTRAVENOUS ONCE
Status: COMPLETED | OUTPATIENT
Start: 2021-01-11 | End: 2021-01-11

## 2021-01-11 RX ORDER — SODIUM CHLORIDE 9 MG/ML
1000 INJECTION, SOLUTION INTRAVENOUS CONTINUOUS
Status: DISCONTINUED | OUTPATIENT
Start: 2021-01-11 | End: 2021-01-11 | Stop reason: HOSPADM

## 2021-01-11 RX ORDER — MAGNESIUM SULFATE HEPTAHYDRATE 40 MG/ML
2 INJECTION, SOLUTION INTRAVENOUS
Status: DISCONTINUED | OUTPATIENT
Start: 2021-01-11 | End: 2021-01-11 | Stop reason: HOSPADM

## 2021-01-11 RX ADMIN — METOPROLOL TARTRATE 2.5 MG: 5 INJECTION INTRAVENOUS at 09:20

## 2021-01-11 RX ADMIN — MIDAZOLAM HYDROCHLORIDE 1 MG: 1 INJECTION, SOLUTION INTRAMUSCULAR; INTRAVENOUS at 09:52

## 2021-01-11 RX ADMIN — SODIUM CHLORIDE 1000 ML: 9 INJECTION, SOLUTION INTRAVENOUS at 08:00

## 2021-01-11 RX ADMIN — TOPICAL ANESTHETIC 1 ML: 200 SPRAY DENTAL; PERIODONTAL at 09:38

## 2021-01-11 RX ADMIN — GLYCOPYRROLATE 0.1 MG: 0.2 INJECTION, SOLUTION INTRAMUSCULAR; INTRAVENOUS at 09:29

## 2021-01-11 RX ADMIN — FENTANYL CITRATE 50 MCG: 50 INJECTION, SOLUTION INTRAMUSCULAR; INTRAVENOUS at 09:46

## 2021-01-11 RX ADMIN — PROPOFOL 100 MG: 10 INJECTION, EMULSION INTRAVENOUS at 10:23

## 2021-01-11 RX ADMIN — HUMAN ALBUMIN MICROSPHERES AND PERFLUTREN 5 ML: 10; .22 INJECTION, SOLUTION INTRAVENOUS at 10:30

## 2021-01-11 RX ADMIN — METOPROLOL TARTRATE 2.5 MG: 5 INJECTION INTRAVENOUS at 09:32

## 2021-01-11 RX ADMIN — FENTANYL CITRATE 25 MCG: 50 INJECTION, SOLUTION INTRAMUSCULAR; INTRAVENOUS at 09:52

## 2021-01-11 RX ADMIN — MIDAZOLAM HYDROCHLORIDE 2 MG: 1 INJECTION, SOLUTION INTRAMUSCULAR; INTRAVENOUS at 09:45

## 2021-01-11 RX ADMIN — LIDOCAINE HYDROCHLORIDE 1.5 ML: 40 SOLUTION TOPICAL at 09:27

## 2021-01-11 RX ADMIN — POTASSIUM CHLORIDE 20 MEQ: 1500 TABLET, EXTENDED RELEASE ORAL at 09:16

## 2021-01-11 ASSESSMENT — MIFFLIN-ST. JEOR
SCORE: 1800.09
SCORE: 1815.97

## 2021-01-11 NOTE — PROGRESS NOTES
Care Suites Discharge Nursing Note    Patient Information  Name: Rashawn Short  Age: 60 year old  Time: 11:15-11-45    S/P FREDI Cardioversion for A-fib  A & O times four. VSS. Denied any pain/discomfort. Tele SB. AVS reviewed with patient and (his wife by phone) by assigned RN.  Sedation precaution re instructed. He is discharged to home with wife.       Chante Granda RN

## 2021-01-11 NOTE — PROGRESS NOTES
PATIENT/VISITOR WELLNESS SCREENING    Step 1 Patient Screening    1. In the last month, have you been in contact with someone who was confirmed or suspected to have Coronavirus/COVID-19? No  1/8/2021 Negative Covid screen     2. Do you have the following symptoms?  Fever/Chills? No   Cough? No   Shortness of breath? No   New loss of taste or smell? No  Sore throat? No  Muscle or body aches? No  Headaches? No  Fatigue? No  Vomiting or diarrhea? No      Step 3 Refer to logic grid below for actions    NO SYMPTOM(S)    ACTIONS:  1. Standard rooming process  2. Provider to assess per normal protocol  3. Implement precautions as needed and per guidelines     POSITIVE SYMPTOM(S)  If positive for ANY of the following symptoms: fever, cough, shortness of breath, rash    ACTION:  1. Continue to have the patient wear a mask   2. Room patient as soon as possible  3. Don appropriate PPE when entering room  4. Provider evaluation

## 2021-01-11 NOTE — ANESTHESIA POSTPROCEDURE EVALUATION
Patient: Rashawn Short    Procedure(s):  ANESTHESIA, FOR CARDIOVERSION    Diagnosis:Atrial fibrillation (H) [I48.91]  Diagnosis Additional Information: No value filed.    Anesthesia Type:  General    Note:  Anesthesia Post Evaluation    Patient location during evaluation: Bedside  Patient participation: Able to fully participate in evaluation  Level of consciousness: awake  Pain management: adequate  Airway patency: patent  Cardiovascular status: acceptable  Respiratory status: acceptable  Hydration status: acceptable  PONV: controlled     Anesthetic complications: None          Last vitals:  Vitals:    01/11/21 1041 01/11/21 1045 01/11/21 1100   BP: 100/75 111/79 116/85   Pulse: 55 55 56   Resp: 18 21 25   Temp:      SpO2: 96% 99% 98%         Electronically Signed By: Jose E Billy MD  January 11, 2021  11:23 AM

## 2021-01-11 NOTE — PROCEDURES
[facility]    Procedure: *FREDI with Cardioversion    Date/Time: 1/11/2021 5:26 PM  Performed by: Austin Hercules MD  Authorized by: Austin Hercules MD     UNIVERSAL PROTOCOL   Site Marked: Yes  Prior Images Obtained and Reviewed:  Yes  Required items: Required blood products, implants, devices and special equipment available    Patient identity confirmed:  Verbally with patient  Patient was reevaluated immediately before administering moderate or deep sedation or anesthesia  Confirmation Checklist:  Patient's identity using two indicators  Time out: Immediately prior to the procedure a time out was called    Universal Protocol: the Joint Commission Universal Protocol was followed    Preparation: Patient was prepped and draped in usual sterile fashion        Anesthesia was administered and monitored by anesthesiology.  See anesthesia documentation for details.     ANESTHESIA    Local Anesthetic:  Lidocaine 1% with epinephrine  Anesthesia was administered and monitored by anesthesiology.  See anesthesia documentation for details.    SEDATION    Patient Sedated: Yes    Vital signs: Vital signs monitored during sedation    PROCEDURE   Patient Tolerance:  Patient tolerated the procedure well with no immediate complications  Describe Procedure: Elective DC Cardioversion Post Procedure Note    Synchronized Cardioversion was attempted once, with 200 J. The patient converted to sinus rhythm. No complications noted.       RECOMMENDATIONS:  1.  Post procedure, the patient will continue current medications including anticoagulation.  2.  Patient to see primary cardiologist for out patient follow up.

## 2021-01-11 NOTE — PROGRESS NOTES
Care Suites Procedure Nursing Note    Patient Information  Name: Rashawn Short  Age: 60 year old    Procedure  Procedure: FREDI/ DCCV   Procedure start time: 0930  Procedure complete time: 1045  Concerns/abnormal assessment: Noted HTN - Metoprolol 5 mg IV given   If abnormal assessment, provider notified: Yes- Dr. Hercules aware   Plan/Other: FREDI- no clot seen   DCCV from Afib to Sinus matheus    ** See Procedural Sedation Document    Care Suites Post Procedure Note    Post Procedure  Time patient returned to Care Suites: remains in CS   Concerns/abnormal assessment:   Post Cardioversion   /80   Sinus matheus/ Sinus rhythm   If abnormal assessment, provider notified: Yes-  aware of BP/P.   Plan/Other: Monitor patient for one hour and discharge to home     Care Suites Discharge Nursing Note    Discharge Education:  Discharge instructions reviewed: Yes, reviewed discharge instructions with patient and wife Ora on the phone.   Additional education/resources provided: Atrial Fib education included   Patient/patient representative verbalizes understanding: Yes  Patient discharging on new medications: Yes  Medication education completed: Yes    1115- Report given to Chante MCKEON -     Discharge Plans:   Discharge location: home  Discharge ride contacted: Yes  Approximate discharge time: 1135    Discharge Criteria:  Discharge criteria met and vital signs stable: Yes    Patient Belongs:  Patient belongings returned to patient: Yes    Sheri Grey RN

## 2021-01-11 NOTE — DISCHARGE INSTRUCTIONS
FREDI  (Transesophageal Echocardiogram)  Discharge Instructions    After you go home:      Have an adult stay with you for 6 hours.       For 24 hours - due to the sedation you received:    Relax and take it easy.    Do NOT make any important or legal decisions.    Do NOT drive or operate machines at home or at work.    Do NOT drink alcohol.    Diet:    You may resume your normal diet, but no scratchy foods for two days.    If your throat is sore, eat cold, bland or soft foods.    You may have heartburn if the tube used in the exam entered your stomach.  If so:   - Do not eat acidic and spicy foods.   - Do not eat three hours before bedtime. Clear liquids are okay.   - When lying down, use two pillows to raise your head.    Medicines:      Take your medications, including blood thinners, unless your provider tells you not to.    If you have stopped any medicines, check with your provider about when to restart them.    You may take Tylenol (Acetaminophen) if your throat is sore.    You may take antacids if you have heartburn.      Follow Up Appointments:      Follow up with your cardiologist at [facility] of patient preference as instructed.    Follow up with your primary care provider as needed.    Call the clinic if:      You have heartburn that is severe or lasts more than 72 hours.    You have a sore throat that feels worse after 72 hours.    You have shortness of breath, neck pain, chest pain, fever, chills, coughing up blood, or other unusual signs.    Questions or concerns      [facility] [place]:    [phone] [facility] (7 days a week)  Cardioversion Discharge Instructions    After you go home:       For 24 hours - due to the sedation you received:      Have an adult stay with you for 24 hours.     Relax and take it easy.    Do NOT make any important or legal decisions.    Do NOT drive or operate machines at home or at work.    Do NOT drink alcohol.    Diet:      Start with  clear liquids and progress to your normal diet as you feel able.    Medicines:      Take your medications, including blood thinners, unless your provider tells you not to.    If you have stopped any medications, check with your provider about when to restart them.    Follow Up Appointments:      Follow up with your cardiologist at [facility] of patient preference as instructed.    Follow up with your primary care provider as needed.    Post cardioversion:    The skin on your chest or back may feel tender for 48 hours.  If your skin is tender, you may:      Use a cold pack on the site. Never use ice directly on your skin. Use the cold pack for 20 minutes. Remove it for at least 30 minutes before re-using.    Apply 1% hydrocortisone cream to the skin (sold at drug stores)    Take Advil (Ibuprofen) or Tylenol (Acetaminophen) per your provider's recommendations.      Call your provider if you have:      Weakness, dizziness, lightheadedness, or fainting.    Shortness of breath.    Irregular heartbeat, feelings of your heart fluttering or beating fast, hard or palpitations.     More than minor skin discomfort or redness where the cardioversion pads were placed.    Questions or concerns.      Call 911 if you have:      Pain in your chest, arm, shoulder, neck, or upper back.    You have problems speaking or seeing.    Weakness in your arm or leg.    You are unable to move your arm or leg.    You have uncontrolled bleeding.         [facility] at [place]:    [phone] [facility] (7 days a week)

## 2021-01-11 NOTE — PROGRESS NOTES
Care Suites Admission Nursing Note    Patient Information  Name: Rashawn Short  Age: 60 year old  Reason for admission: FREDI/ DCCV for Atrial Fib  Care Suites arrival time: 0745    Patient Admission/Assessment   Pre-procedure assessment complete: Yes  If abnormal assessment/labs, provider notified: Yes  K 3.7  Mag 2.3  Pt. Noted to have HTN - Dr. Hercules aware and Metoprolol 5 mg IV given per protocol   NPO: Yes  Medications held per instructions/orders: No, explain Pt took all cardiac and Xarelto meds  Consent: obtained  Patient oriented to room: Yes  Education/questions answered: Yes  Plan/other: FREDI/ DCCV    Discharge Planning  Discharge name/phone number: Ora 0- spouse- [phone]  Overnight post sedation caregiver: Ora  Wife   Discharge location: home    Sheri Grey RN

## 2021-01-13 LAB
INTERPRETATION ECG - MUSE: NORMAL
INTERPRETATION ECG - MUSE: NORMAL

## 2021-02-01 ENCOUNTER — HOSPITAL ENCOUNTER (OUTPATIENT)
Dept: CARDIOLOGY | Facility: CLINIC | Age: 61
Discharge: HOME | End: 2021-02-01
Attending: INTERNAL MEDICINE | Admitting: INTERNAL MEDICINE
Payer: COMMERCIAL

## 2021-02-01 DIAGNOSIS — I48.0 PAROXYSMAL ATRIAL FIBRILLATION (H): ICD-10-CM

## 2021-02-01 PROCEDURE — 93225 XTRNL ECG REC<48 HRS REC: CPT

## 2021-02-01 PROCEDURE — 93227 XTRNL ECG REC<48 HR R&I: CPT | Performed by: INTERNAL MEDICINE

## 2021-02-05 ENCOUNTER — TELEPHONE (OUTPATIENT)
Dept: CARDIOLOGY | Facility: CLINIC | Age: 61
End: 2021-02-05

## 2021-02-05 NOTE — TELEPHONE ENCOUNTER
----- Message from Austin Hercules MD sent at 2/4/2021  5:09 PM CST -----  Normal results. Continue current care and follow up as planned.     Austin

## 2021-02-05 NOTE — TELEPHONE ENCOUNTER
I phoned patient results of his 48 hr holter and told him there is good news in that there is no afib. He was very pleased to hear this.His average HR was 48 bpm on amio 200 MG daily and metoprolol 75 MG  BID.    He tells me that he feels great and has good exercise tolerance. He is trying to lose weight and has daily brisk walks with his wife. I applauded him on his efforts.I stressed to him the importance of lifestyle measures to keep afib at bay.    He is due to see Dr. Hercules in April. In the meantime, I will talk to Dr. Hercules to see if he wants to adjust any of his medications in light of his 48 bpm heart rate.    I will mail a copy of the holter face sheet to patient with our nurse line number.    All questions addressed to his satisfaction.

## 2021-02-12 ENCOUNTER — TELEPHONE (OUTPATIENT)
Dept: CARDIOLOGY | Facility: CLINIC | Age: 61
End: 2021-02-12

## 2021-02-12 DIAGNOSIS — I48.91 ATRIAL FIBRILLATION WITH RVR (H): ICD-10-CM

## 2021-02-12 RX ORDER — METOPROLOL SUCCINATE 25 MG/1
TABLET, EXTENDED RELEASE ORAL
Qty: 180 TABLET | Refills: 0 | COMMUNITY
Start: 2021-02-12 | End: 2021-12-17

## 2021-02-12 NOTE — TELEPHONE ENCOUNTER
I called patient to tell him that I reviewed with Dr. Hercules this afternoon the fact that his resting HR was 48 bpm on amio 200 MG daily and metoprolol 75 MG BID.His lowest HR was 38 bpm.  Dr. Hercules recommends that he drop the metoprolol to 50 MG or 2 -25 MG tablets twice daily.Patient verbalized agreement and understanding.

## 2021-05-17 DIAGNOSIS — Z11.59 ENCOUNTER FOR SCREENING FOR OTHER VIRAL DISEASES: ICD-10-CM

## 2021-06-01 DIAGNOSIS — Z11.59 ENCOUNTER FOR SCREENING FOR OTHER VIRAL DISEASES: ICD-10-CM

## 2021-06-25 DIAGNOSIS — Z11.59 ENCOUNTER FOR SCREENING FOR OTHER VIRAL DISEASES: ICD-10-CM

## 2021-06-25 LAB
SARS-COV-2 RNA RESP QL NAA+PROBE: NORMAL
SPECIMEN SOURCE: NORMAL

## 2021-06-25 PROCEDURE — U0003 INFECTIOUS AGENT DETECTION BY NUCLEIC ACID (DNA OR RNA); SEVERE ACUTE RESPIRATORY SYNDROME CORONAVIRUS 2 (SARS-COV-2) (CORONAVIRUS DISEASE [COVID-19]), AMPLIFIED PROBE TECHNIQUE, MAKING USE OF HIGH THROUGHPUT TECHNOLOGIES AS DESCRIBED BY CMS-2020-01-R: HCPCS | Performed by: COLON & RECTAL SURGERY

## 2021-06-25 PROCEDURE — U0005 INFEC AGEN DETEC AMPLI PROBE: HCPCS | Performed by: COLON & RECTAL SURGERY

## 2021-06-28 ENCOUNTER — HOSPITAL ENCOUNTER (OUTPATIENT)
Facility: CLINIC | Age: 61
Discharge: HOME | End: 2021-06-28
Attending: COLON & RECTAL SURGERY | Admitting: COLON & RECTAL SURGERY
Payer: COMMERCIAL

## 2021-06-28 VITALS
HEART RATE: 45 BPM | RESPIRATION RATE: 20 BRPM | SYSTOLIC BLOOD PRESSURE: 116 MMHG | BODY MASS INDEX: 30.78 KG/M2 | WEIGHT: 215 LBS | OXYGEN SATURATION: 98 % | HEIGHT: 70 IN | DIASTOLIC BLOOD PRESSURE: 70 MMHG

## 2021-06-28 LAB — COLONOSCOPY: NORMAL

## 2021-06-28 PROCEDURE — 250N000011 HC RX IP 250 OP 636: Performed by: COLON & RECTAL SURGERY

## 2021-06-28 PROCEDURE — 45385 COLONOSCOPY W/LESION REMOVAL: CPT | Performed by: COLON & RECTAL SURGERY

## 2021-06-28 PROCEDURE — G0500 MOD SEDAT ENDO SERVICE >5YRS: HCPCS | Performed by: COLON & RECTAL SURGERY

## 2021-06-28 PROCEDURE — 88305 TISSUE EXAM BY PATHOLOGIST: CPT | Mod: 26 | Performed by: PATHOLOGY

## 2021-06-28 PROCEDURE — 272N000105 HC DEVICE CLIP QUICK: Performed by: COLON & RECTAL SURGERY

## 2021-06-28 PROCEDURE — 88305 TISSUE EXAM BY PATHOLOGIST: CPT | Mod: TC | Performed by: COLON & RECTAL SURGERY

## 2021-06-28 RX ORDER — FENTANYL CITRATE 50 UG/ML
INJECTION, SOLUTION INTRAMUSCULAR; INTRAVENOUS PRN
Status: COMPLETED | OUTPATIENT
Start: 2021-06-28 | End: 2021-06-28

## 2021-06-28 RX ORDER — ASPIRIN 81 MG/1
81 TABLET ORAL DAILY
Status: ON HOLD | COMMUNITY
End: 2023-05-08

## 2021-06-28 RX ADMIN — FENTANYL CITRATE 100 MCG: 50 INJECTION, SOLUTION INTRAMUSCULAR; INTRAVENOUS at 09:48

## 2021-06-28 RX ADMIN — MIDAZOLAM 2 MG: 1 INJECTION INTRAMUSCULAR; INTRAVENOUS at 09:46

## 2021-06-28 ASSESSMENT — MIFFLIN-ST. JEOR: SCORE: 1781.48

## 2021-06-28 NOTE — H&P
Pre-Endoscopy History and Physical     Rashawn Short MRN# [id number]   YOB: 1960 Age: 61 year old     Date of Procedure: 6/28/2021  Primary care provider: Ludwig Carrillo  Type of Endoscopy: colonoscopy  Reason for Procedure: screeniing  Type of Anesthesia Anticipated: Moderate Sedation    HPI:    Rashawn is a 61 year old male who will be undergoing the above procedure.      A history and physical has been performed. The patient's medications and allergies have been reviewed. The risks and benefits of the procedure including the risk of bleeding, perforation, and missed lesions as well as the sedation options and risks were discussed with the patient.  All questions were answered and informed consent was obtained.      No Known Allergies     Current Facility-Administered Medications   Medication     fentaNYL (PF) (SUBLIMAZE) injection     midazolam (VERSED) injection     Facility-Administered Medications Ordered in Other Encounters   Medication     Self Administer Medications: Behavioral Services       Medications Prior to Admission   Medication Sig Dispense Refill Last Dose     amiodarone (PACERONE) 200 MG tablet Take 1 tablet (200 mg) by mouth daily 120 tablet 3 6/27/2021 at Unknown time     aspirin 81 MG EC tablet Take 81 mg by mouth daily   Past Week at Unknown time     metoprolol succinate ER (TOPROL-XL) 25 MG 24 hr tablet Take 2-25 MG tablets ( 50 MG ) twice daily 180 tablet 0 6/27/2021 at Unknown time     multivitamin, therapeutic (THERA-VIT) TABS tablet Take 1 tablet by mouth daily   Past Week at Unknown time     rivaroxaban ANTICOAGULANT (XARELTO) 20 MG TABS tablet Take 1 tablet (20 mg) by mouth daily (with dinner) 90 tablet 3 6/27/2021 at Unknown time     thiamine (B-1) 100 MG tablet Take 100 mg by mouth daily   Past Week at Unknown time     fish oil-omega-3 fatty acids 1000 MG capsule Take 1 g by mouth daily          Patient Active Problem List   Diagnosis     EtOH dependence (H)      "Hypertension goal BP (blood pressure) < 140/90     Alcohol dependence (H)     Chemical dependency (H)     Paroxysmal atrial fibrillation (H)     Atrial fibrillation with RVR (H)        Past Medical History:   Diagnosis Date     Atrial fibrillation with RVR (H) 12/28/2020     Depressive disorder      EtOH dependence (H)      Hypertension      Paroxysmal atrial fibrillation (H) 01/29/2010        Past Surgical History:   Procedure Laterality Date     ANESTHESIA CARDIOVERSION N/A 1/11/2021    Procedure: ANESTHESIA, FOR CARDIOVERSION;  Surgeon: GENERIC ANESTHESIA PROVIDER;  Location:  OR     ORTHOPEDIC SURGERY  1998    ACL Right knee reconstruction-        Social History     Tobacco Use     Smoking status: Never Smoker     Smokeless tobacco: Never Used   Substance Use Topics     Alcohol use: Not Currently     Comment: no alcolhol 2017       Family History   Problem Relation Age of Onset     Arrhythmia Father         a-fib     Colon Polyps Sister      C.A.D. No family hx of      Diabetes No family hx of      Hypertension No family hx of      Cerebrovascular Disease No family hx of      Breast Cancer No family hx of      Cancer - colorectal No family hx of      Prostate Cancer No family hx of          PHYSICAL EXAM:   BP (!) 161/104   Resp 16   Ht 1.778 m (5' 10\")   Wt 97.5 kg (215 lb)   SpO2 99%   BMI 30.85 kg/m   Estimated body mass index is 30.85 kg/m  as calculated from the following:    Height as of this encounter: 1.778 m (5' 10\").    Weight as of this encounter: 97.5 kg (215 lb).   Mental status - alert and oriented  RESP: lungs clear  CV: RRR  AIRWAY EXAM: Mallampatti Class II (visualization of the soft palate, fauces, and uvula)    IMPRESSION   ASA Class 2 - Mild systemic disease      Signed Electronically by: Moises Diana MD  June 28, 2021    Colorectal Surgery  [phone] (office)  [phone] (pager)  [email or web address]          "

## 2021-06-30 LAB — COPATH REPORT: NORMAL

## 2021-07-08 ENCOUNTER — OFFICE VISIT (OUTPATIENT)
Dept: CARDIOLOGY | Facility: CLINIC | Age: 61
End: 2021-07-08
Attending: INTERNAL MEDICINE
Payer: COMMERCIAL

## 2021-07-08 VITALS
SYSTOLIC BLOOD PRESSURE: 148 MMHG | BODY MASS INDEX: 31.77 KG/M2 | WEIGHT: 226.9 LBS | HEART RATE: 70 BPM | HEIGHT: 71 IN | DIASTOLIC BLOOD PRESSURE: 99 MMHG

## 2021-07-08 DIAGNOSIS — I48.0 PAROXYSMAL ATRIAL FIBRILLATION (H): ICD-10-CM

## 2021-07-08 PROCEDURE — 99214 OFFICE O/P EST MOD 30 MIN: CPT | Performed by: INTERNAL MEDICINE

## 2021-07-08 RX ORDER — AMLODIPINE BESYLATE 2.5 MG/1
2.5 TABLET ORAL DAILY
Qty: 90 TABLET | Refills: 3 | Status: SHIPPED | OUTPATIENT
Start: 2021-07-08 | End: 2022-05-24

## 2021-07-08 RX ORDER — HYDROCHLOROTHIAZIDE 25 MG/1
25 TABLET ORAL DAILY
Status: ON HOLD | COMMUNITY
End: 2023-05-08

## 2021-07-08 ASSESSMENT — MIFFLIN-ST. JEOR: SCORE: 1851.34

## 2021-07-08 NOTE — PROGRESS NOTES
HPI and Plan:   Today I had the pleasure of seeing Rashawn Short at [facility]. He is a pleasant 61 year old patient with a past medical history of paroxysmal atrial fibrillation, hypertension, prior alcoholism [sober 3 years depression who presents to the clinic for a follow-up visit.    I had previously seen the patient for new onset atrial fibrillation.  I started him on amiodarone and perform FREDI guided cardioversion.  He has also been on metoprolol for rate control.  Subsequent rhythm monitor did not show recurrence of atrial fibrillation.  Transthoracic echocardiogram had shown normal ejection fraction of 55 to 60%, no significant valvular disease and mildly enlarged left atrium.    Today, the patient tells me that he has been feeling extremely well.  He is extremely kind and thanks me repeatedly for taking care of him.  He has been sober for 3 years.  He also reports trying to eat healthy and losing weight.  I congratulated him on his efforts.    Assessment and plan:   1.  Paroxysmal atrial fibrillation-chads 2 vascular score of 1 for hypertension  2.  Essential hypertension-uncontrolled  3.  Alcoholism-sober for last 3 years  4.  Obesity-undertaking lifestyle modification    The patient had a single episode of atrial fibrillation which was in the setting of alcohol abuse.  He has not had any more recurrence since undergoing cardioversion and abstaining from alcohol.  He does have enlarged left atrium which puts him at a high risk of recurrence but for now he maintained sinus rhythm.  He is on amiodarone and anticoagulation which were started prior to cardioversion.  Since he has not had any recurrence, has low [id number] score I had a long discussion with him about discontinuation of both these.  I discussed the risks and benefits of stopping these medications and we collectively decided that we will stop both, amiodarone and Xarelto.  He will start low-dose aspirin after stopping  Xarelto.  I will repeat a Zio patch in few months to reassess his rhythm.  He will continue metoprolol unchanged for now.  I discussed other risk factors for atrial fibrillation such as poorly controlled hypertension, obesity and sleep apnea.  To better control his blood pressure I will start him on low-dose of Norvasc which we can uptitrate as needed.  Not sure if he has had a sleep study but should talk to his primary care physician about getting 1 to rule out sleep apnea.    Thank you for allowing me to participate in the care of Rashawn Short    This note was completed in part using Dragon voice recognition software. Although reviewed after completion, some word and grammatical errors may occur.    Austin Hercules MD  Cardiology    Orders Placed This Encounter   Procedures     Follow-Up with Cardiologist     EKG 12-lead complete w/read - Clinics (performed today)     Leadless EKG Monitor 8 to 14 Days       Orders Placed This Encounter   Medications     hydrochlorothiazide (HYDRODIURIL) 25 MG tablet     Sig: Take 25 mg by mouth daily     amLODIPine (NORVASC) 2.5 MG tablet     Sig: Take 1 tablet (2.5 mg) by mouth daily     Dispense:  90 tablet     Refill:  3       Medications Discontinued During This Encounter   Medication Reason     amiodarone (PACERONE) 200 MG tablet Therapy completed     rivaroxaban ANTICOAGULANT (XARELTO) 20 MG TABS tablet Therapy completed       Encounter Diagnosis   Name Primary?     Paroxysmal atrial fibrillation (H)        CURRENT MEDICATIONS:  Current Outpatient Medications   Medication Sig Dispense Refill     amLODIPine (NORVASC) 2.5 MG tablet Take 1 tablet (2.5 mg) by mouth daily 90 tablet 3     aspirin 81 MG EC tablet Take 81 mg by mouth daily       fish oil-omega-3 fatty acids 1000 MG capsule Take 1 g by mouth daily       hydrochlorothiazide (HYDRODIURIL) 25 MG tablet Take 25 mg by mouth daily       metoprolol succinate ER (TOPROL-XL) 25 MG 24 hr tablet Take 2-25 MG tablets ( 50 MG )  twice daily 180 tablet 0     multivitamin, therapeutic (THERA-VIT) TABS tablet Take 1 tablet by mouth daily       thiamine (B-1) 100 MG tablet Take 100 mg by mouth daily         ALLERGIES   No Known Allergies    PAST MEDICAL HISTORY:  Past Medical History:   Diagnosis Date     Atrial fibrillation with RVR (H) 12/28/2020     Depressive disorder      EtOH dependence (H)      Hypertension      Paroxysmal atrial fibrillation (H) 01/29/2010       PAST SURGICAL HISTORY:  Past Surgical History:   Procedure Laterality Date     ANESTHESIA CARDIOVERSION N/A 1/11/2021    Procedure: ANESTHESIA, FOR CARDIOVERSION;  Surgeon: GENERIC ANESTHESIA PROVIDER;  Location:  OR     ORTHOPEDIC SURGERY  1998    ACL Right knee reconstruction-        FAMILY HISTORY:  Family History   Problem Relation Age of Onset     Arrhythmia Father         a-fib     Colon Polyps Sister      C.A.D. No family hx of      Diabetes No family hx of      Hypertension No family hx of      Cerebrovascular Disease No family hx of      Breast Cancer No family hx of      Cancer - colorectal No family hx of      Prostate Cancer No family hx of        SOCIAL HISTORY:  Social History     Socioeconomic History     Marital status:      Spouse name: None     Number of children: None     Years of education: None     Highest education level: None   Occupational History     None   Social Needs     Financial resource strain: None     Food insecurity     Worry: None     Inability: None     Transportation needs     Medical: None     Non-medical: None   Tobacco Use     Smoking status: Never Smoker     Smokeless tobacco: Never Used   Substance and Sexual Activity     Alcohol use: Not Currently     Comment: no alcolhol 2017     Drug use: No     Sexual activity: Yes     Partners: Female   Lifestyle     Physical activity     Days per week: None     Minutes per session: None     Stress: None   Relationships     Social connections     Talks on phone: None     Gets together: None  "    Attends Sikh service: None     Active member of club or organization: None     Attends meetings of clubs or organizations: None     Relationship status: None     Intimate partner violence     Fear of current or ex partner: None     Emotionally abused: None     Physically abused: None     Forced sexual activity: None   Other Topics Concern     Parent/sibling w/ CABG, MI or angioplasty before 65F 55M? Not Asked   Social History Narrative     None       Review of Systems:  Skin:  Negative       Eyes:  Negative      ENT:  Negative      Respiratory:  Negative shortness of breath;sleep apnea     Cardiovascular:  Negative;palpitations;chest pain;edema;lightheadedness;dizziness      Gastroenterology: Negative      Genitourinary:  Negative      Musculoskeletal:  Negative back pain;neck pain;arthritis;joint pain    Neurologic:  Negative headaches    Psychiatric:  Negative anxiety;depression    Heme/Lymph/Imm:  Negative allergies    Endocrine:  Negative diabetes;thyroid disorder      Physical Exam:  Vitals: BP (!) 148/99 (BP Location: Left arm)   Pulse 70   Ht 1.803 m (5' 11\")   Wt 102.9 kg (226 lb 14.4 oz)   BMI 31.65 kg/m    Eyes: No icterus.  Pulmonary: Chest symmetric, lungs clear bilaterally and no crackles, wheezes or rales.  Cardiovascular: RRR with normal S1 and S2, no murmur, JVP normal.  Musculoskeletal: Edema of the lower extremities: None.  Neurologic: Oriented and appropriate without obvious focal deficits.   Psychiatric: Normal affect.     Recent Lab Results:  LIPID RESULTS:  Lab Results   Component Value Date    CHOL 208 (H) 03/02/2005    HDL 61 03/02/2005     03/02/2005    TRIG 112 03/02/2005    CHOLHDLRATIO 3.4 03/02/2005       LIVER ENZYME RESULTS:  Lab Results   Component Value Date    AST 50 (H) 12/28/2020    ALT 98 (H) 12/28/2020       CBC RESULTS:  Lab Results   Component Value Date    WBC 8.2 12/29/2020    RBC 5.60 12/29/2020    HGB 16.5 12/29/2020    HCT 49.8 12/29/2020    MCV 89 " 12/29/2020    MCH 29.5 12/29/2020    MCHC 33.1 12/29/2020    RDW 13.7 12/29/2020     12/29/2020       BMP RESULTS:  Lab Results   Component Value Date     12/29/2020    POTASSIUM 3.7 01/11/2021    CHLORIDE 108 12/29/2020    CO2 26 12/29/2020    ANIONGAP 3 12/29/2020    GLC 86 12/29/2020    BUN 15 12/29/2020    CR 0.98 12/29/2020    GFRESTIMATED 82 12/29/2020    GFRESTBLACK >90 12/29/2020    KELSEY 8.6 12/29/2020        A1C RESULTS:  No results found for: A1C    INR RESULTS:  Lab Results   Component Value Date    INR 1.74 (H) 01/11/2021    INR 0.98 12/28/2020       CC  Austin Hercules MD  [address]  [place] [id number]    All medical records were reviewed in detail and discussed with the patient. Greater than 30 mins were spent with the patient, 50% of this time was spent on counseling and coordination of care.  After visit summary was printed and given to the patient.

## 2021-07-08 NOTE — LETTER
7/8/2021    Ludwig Carrillo MD  [address]  [facility] [id number]    RE: Rashawn Short       Dear Colleague,    I had the pleasure of seeing Rashawn Short in the [facility].    HPI and Plan:   Today I had the pleasure of seeing Rashawn Short at [facility]. He is a pleasant 61 year old patient with a past medical history of paroxysmal atrial fibrillation, hypertension, prior alcoholism [sober 3 years depression who presents to the clinic for a follow-up visit.    I had previously seen the patient for new onset atrial fibrillation.  I started him on amiodarone and perform FREDI guided cardioversion.  He has also been on metoprolol for rate control.  Subsequent rhythm monitor did not show recurrence of atrial fibrillation.  Transthoracic echocardiogram had shown normal ejection fraction of 55 to 60%, no significant valvular disease and mildly enlarged left atrium.    Today, the patient tells me that he has been feeling extremely well.  He is extremely kind and thanks me repeatedly for taking care of him.  He has been sober for 3 years.  He also reports trying to eat healthy and losing weight.  I congratulated him on his efforts.    Assessment and plan:   1.  Paroxysmal atrial fibrillation-chads 2 vascular score of 1 for hypertension  2.  Essential hypertension-uncontrolled  3.  Alcoholism-sober for last 3 years  4.  Obesity-undertaking lifestyle modification    The patient had a single episode of atrial fibrillation which was in the setting of alcohol abuse.  He has not had any more recurrence since undergoing cardioversion and abstaining from alcohol.  He does have enlarged left atrium which puts him at a high risk of recurrence but for now he maintained sinus rhythm.  He is on amiodarone and anticoagulation which were started prior to cardioversion.  Since he has not had any recurrence, has low [id number]-CARf score I had a long  discussion with him about discontinuation of both these.  I discussed the risks and benefits of stopping these medications and we collectively decided that we will stop both, amiodarone and Xarelto.  He will start low-dose aspirin after stopping Xarelto.  I will repeat a Zio patch in few months to reassess his rhythm.  He will continue metoprolol unchanged for now.  I discussed other risk factors for atrial fibrillation such as poorly controlled hypertension, obesity and sleep apnea.  To better control his blood pressure I will start him on low-dose of Norvasc which we can uptitrate as needed.  Not sure if he has had a sleep study but should talk to his primary care physician about getting 1 to rule out sleep apnea.    Thank you for allowing me to participate in the care of Rashawn Short    This note was completed in part using Dragon voice recognition software. Although reviewed after completion, some word and grammatical errors may occur.    Austin Hercules MD  Cardiology    Orders Placed This Encounter   Procedures     Follow-Up with Cardiologist     EKG 12-lead complete w/read - Clinics (performed today)     Leadless EKG Monitor 8 to 14 Days       Orders Placed This Encounter   Medications     hydrochlorothiazide (HYDRODIURIL) 25 MG tablet     Sig: Take 25 mg by mouth daily     amLODIPine (NORVASC) 2.5 MG tablet     Sig: Take 1 tablet (2.5 mg) by mouth daily     Dispense:  90 tablet     Refill:  3       Medications Discontinued During This Encounter   Medication Reason     amiodarone (PACERONE) 200 MG tablet Therapy completed     rivaroxaban ANTICOAGULANT (XARELTO) 20 MG TABS tablet Therapy completed       Encounter Diagnosis   Name Primary?     Paroxysmal atrial fibrillation (H)        CURRENT MEDICATIONS:  Current Outpatient Medications   Medication Sig Dispense Refill     amLODIPine (NORVASC) 2.5 MG tablet Take 1 tablet (2.5 mg) by mouth daily 90 tablet 3     aspirin 81 MG EC tablet Take 81 mg by mouth daily        fish oil-omega-3 fatty acids 1000 MG capsule Take 1 g by mouth daily       hydrochlorothiazide (HYDRODIURIL) 25 MG tablet Take 25 mg by mouth daily       metoprolol succinate ER (TOPROL-XL) 25 MG 24 hr tablet Take 2-25 MG tablets ( 50 MG ) twice daily 180 tablet 0     multivitamin, therapeutic (THERA-VIT) TABS tablet Take 1 tablet by mouth daily       thiamine (B-1) 100 MG tablet Take 100 mg by mouth daily         ALLERGIES   No Known Allergies    PAST MEDICAL HISTORY:  Past Medical History:   Diagnosis Date     Atrial fibrillation with RVR (H) 12/28/2020     Depressive disorder      EtOH dependence (H)      Hypertension      Paroxysmal atrial fibrillation (H) 01/29/2010       PAST SURGICAL HISTORY:  Past Surgical History:   Procedure Laterality Date     ANESTHESIA CARDIOVERSION N/A 1/11/2021    Procedure: ANESTHESIA, FOR CARDIOVERSION;  Surgeon: GENERIC ANESTHESIA PROVIDER;  Location:  OR     ORTHOPEDIC SURGERY  1998    ACL Right knee reconstruction-        FAMILY HISTORY:  Family History   Problem Relation Age of Onset     Arrhythmia Father         a-fib     Colon Polyps Sister      C.A.D. No family hx of      Diabetes No family hx of      Hypertension No family hx of      Cerebrovascular Disease No family hx of      Breast Cancer No family hx of      Cancer - colorectal No family hx of      Prostate Cancer No family hx of        SOCIAL HISTORY:  Social History     Socioeconomic History     Marital status:      Spouse name: None     Number of children: None     Years of education: None     Highest education level: None   Occupational History     None   Social Needs     Financial resource strain: None     Food insecurity     Worry: None     Inability: None     Transportation needs     Medical: None     Non-medical: None   Tobacco Use     Smoking status: Never Smoker     Smokeless tobacco: Never Used   Substance and Sexual Activity     Alcohol use: Not Currently     Comment: no alcolhol 2017     Drug  "use: No     Sexual activity: Yes     Partners: Female   Lifestyle     Physical activity     Days per week: None     Minutes per session: None     Stress: None   Relationships     Social connections     Talks on phone: None     Gets together: None     Attends Zoroastrianism service: None     Active member of club or organization: None     Attends meetings of clubs or organizations: None     Relationship status: None     Intimate partner violence     Fear of current or ex partner: None     Emotionally abused: None     Physically abused: None     Forced sexual activity: None   Other Topics Concern     Parent/sibling w/ CABG, MI or angioplasty before 65F 55M? Not Asked   Social History Narrative     None       Review of Systems:  Skin:  Negative       Eyes:  Negative      ENT:  Negative      Respiratory:  Negative shortness of breath;sleep apnea     Cardiovascular:  Negative;palpitations;chest pain;edema;lightheadedness;dizziness      Gastroenterology: Negative      Genitourinary:  Negative      Musculoskeletal:  Negative back pain;neck pain;arthritis;joint pain    Neurologic:  Negative headaches    Psychiatric:  Negative anxiety;depression    Heme/Lymph/Imm:  Negative allergies    Endocrine:  Negative diabetes;thyroid disorder      Physical Exam:  Vitals: BP (!) 148/99 (BP Location: Left arm)   Pulse 70   Ht 1.803 m (5' 11\")   Wt 102.9 kg (226 lb 14.4 oz)   BMI 31.65 kg/m    Eyes: No icterus.  Pulmonary: Chest symmetric, lungs clear bilaterally and no crackles, wheezes or rales.  Cardiovascular: RRR with normal S1 and S2, no murmur, JVP normal.  Musculoskeletal: Edema of the lower extremities: None.  Neurologic: Oriented and appropriate without obvious focal deficits.   Psychiatric: Normal affect.     Recent Lab Results:  LIPID RESULTS:  Lab Results   Component Value Date    CHOL 208 (H) 03/02/2005    HDL 61 03/02/2005     03/02/2005    TRIG 112 03/02/2005    CHOLHDLRATIO 3.4 03/02/2005       LIVER ENZYME " RESULTS:  Lab Results   Component Value Date    AST 50 (H) 12/28/2020    ALT 98 (H) 12/28/2020       CBC RESULTS:  Lab Results   Component Value Date    WBC 8.2 12/29/2020    RBC 5.60 12/29/2020    HGB 16.5 12/29/2020    HCT 49.8 12/29/2020    MCV 89 12/29/2020    MCH 29.5 12/29/2020    MCHC 33.1 12/29/2020    RDW 13.7 12/29/2020     12/29/2020       BMP RESULTS:  Lab Results   Component Value Date     12/29/2020    POTASSIUM 3.7 01/11/2021    CHLORIDE 108 12/29/2020    CO2 26 12/29/2020    ANIONGAP 3 12/29/2020    GLC 86 12/29/2020    BUN 15 12/29/2020    CR 0.98 12/29/2020    GFRESTIMATED 82 12/29/2020    GFRESTBLACK >90 12/29/2020    KELSEY 8.6 12/29/2020        A1C RESULTS:  No results found for: A1C    INR RESULTS:  Lab Results   Component Value Date    INR 1.74 (H) 01/11/2021    INR 0.98 12/28/2020       CC  Austin Hercules MD  [address]  [place]  [place] [address]    All medical records were reviewed in detail and discussed with the patient. Greater than 30 mins were spent with the patient, 50% of this time was spent on counseling and coordination of care.  After visit summary was printed and given to the patient.        Thank you for allowing me to participate in the care of your patient.      Sincerely,     Austin Hercules MD     [facility]  cc:   Austin Hercules MD  [address]  [place] [address]

## 2021-09-05 ENCOUNTER — HEALTH MAINTENANCE LETTER (OUTPATIENT)
Age: 61
End: 2021-09-05

## 2021-09-07 ENCOUNTER — CARE COORDINATION (OUTPATIENT)
Dept: CARDIOLOGY | Facility: CLINIC | Age: 61
End: 2021-09-07

## 2021-09-07 NOTE — PROGRESS NOTES
Bambi Starkey [facility] Heart Team 3  Hi,     This pt called just wanted to let Diomedes know he doesn't have ins at this time so he cannot do the ZP monitor will call back and r/s once he does.     I think he just wanted to make sure someone knows because he had med change the last time he talked to Diomedes he said.     Thank you,   Myrna Peña appreciated.     Marisol Abdullahi, BSN, RN, PHN, HNB-BC   9/7/2021 at 2:10 PM

## 2021-09-13 ENCOUNTER — TRANSFERRED RECORDS (OUTPATIENT)
Dept: HEALTH INFORMATION MANAGEMENT | Facility: CLINIC | Age: 61
End: 2021-09-13

## 2021-09-13 ENCOUNTER — TELEPHONE (OUTPATIENT)
Dept: CARDIOLOGY | Facility: CLINIC | Age: 61
End: 2021-09-13

## 2021-09-13 NOTE — TELEPHONE ENCOUNTER
Pt called stating that he has been feeling great but happened to go to his PCP for a physical and med refill and they took a listen to him and noticed that his rhythm was irregular. Pt stated they said his rate wasn't too fast, about 100. They took a listen a little later and felt that he was no in sustained a fib but going in and out. No EKG done, that patient is aware of. Pt did mentioned that his Metoprolol ran out about a week ago and he was on his way to pick that up but is wondering if that is why he may be going in and out of A fib. Pt has no symptoms. Pt unfortunately unable to complete Ziopatch as his insurance ran out due to his wife losing her job and they are working on getting new insurance. Pt stated he will call once he gets new insurance to get the ziopatch set up. Pt did confirm that he has been taking ASA daily with no missed doses.   Will route to Dr. Hercules to review as patient is no longer on Xarelto and Amiodarone       7/8/21 OV with Dr. Hercules   The patient had a single episode of atrial fibrillation which was in the setting of alcohol abuse.  He has not had any more recurrence since undergoing cardioversion and abstaining from alcohol.  He does have enlarged left atrium which puts him at a high risk of recurrence but for now he maintained sinus rhythm.  He is on amiodarone and anticoagulation which were started prior to cardioversion.  Since he has not had any recurrence, has low [id number] score I had a long discussion with him about discontinuation of both these.  I discussed the risks and benefits of stopping these medications and we collectively decided that we will stop both, amiodarone and Xarelto.  He will start low-dose aspirin after stopping Xarelto.  I will repeat a Zio patch in few months to reassess his rhythm.  He will continue metoprolol unchanged for now.  I discussed other risk factors for atrial fibrillation such as poorly controlled hypertension, obesity and sleep apnea.   To better control his blood pressure I will start him on low-dose of Norvasc which we can uptitrate as needed.  Not sure if he has had a sleep study but should talk to his primary care physician about getting 1 to rule out sleep apnea.

## 2021-10-25 ENCOUNTER — TRANSFERRED RECORDS (OUTPATIENT)
Dept: HEALTH INFORMATION MANAGEMENT | Facility: CLINIC | Age: 61
End: 2021-10-25

## 2021-11-23 ENCOUNTER — HOSPITAL ENCOUNTER (OUTPATIENT)
Dept: CARDIOLOGY | Facility: CLINIC | Age: 61
Discharge: HOME | End: 2021-11-23
Attending: INTERNAL MEDICINE | Admitting: INTERNAL MEDICINE
Payer: COMMERCIAL

## 2021-11-23 DIAGNOSIS — I48.0 PAROXYSMAL ATRIAL FIBRILLATION (H): ICD-10-CM

## 2021-11-23 PROCEDURE — 93246 EXT ECG>7D<15D RECORDING: CPT

## 2021-11-23 PROCEDURE — 93248 EXT ECG>7D<15D REV&INTERPJ: CPT | Performed by: INTERNAL MEDICINE

## 2021-12-17 ENCOUNTER — CARE COORDINATION (OUTPATIENT)
Dept: CARDIOLOGY | Facility: CLINIC | Age: 61
End: 2021-12-17
Payer: COMMERCIAL

## 2021-12-17 DIAGNOSIS — I48.91 ATRIAL FIBRILLATION WITH RVR (H): ICD-10-CM

## 2021-12-17 DIAGNOSIS — I47.29 PAROXYSMAL VENTRICULAR TACHYCARDIA (H): Primary | ICD-10-CM

## 2021-12-17 RX ORDER — METOPROLOL SUCCINATE 100 MG/1
100 TABLET, EXTENDED RELEASE ORAL DAILY
Qty: 90 TABLET | Refills: 3 | Status: SHIPPED | OUTPATIENT
Start: 2021-12-17 | End: 2022-11-17

## 2021-12-17 NOTE — PROGRESS NOTES
Incoming Zio results. Patient saw Dr. Hercules 21 for a single episode of AF , possibly ETOH-related. Had not had recurrence of AF, amio and Xarelto stopped. Zio to reassess rhythm.     Note predominant AF, ave rate of 96, also NSVT 14 runs, highest run of 5. Per report, patient was asymptomatic.     Spoke with Dr. Hercules. He recommends lab work, ischemic workup, increase metoprolol.      Called patient to check in. He states he is feeling great. He reports that he is 4 years sober as of today. I congratulated him on this major accomplishment.     Unfortunately, he reports that his father passed on Tuesday of this week, and things are chaotic while planning the , which is scheduled for Wednesday of next week.     Today, we discussed Zio patch results. I told him that he is back in afib though the rate is controlled, and that Dr. Hercules doesn't feel the need to restart Xarelto.     I told him that we did detect some VT and because of that we want to do workup to see if we can identify a cause. He states he will eagerly accept any recommendations from the team.    I told him that we would like to take a look at his kidneys and electrolytes, increase his metoprolol, and have him come in for lexiscan. He agrees. Of note, he states he is currently taking metoprolol tartrate 75 mg daily (different from what is in our system). I told him that we will change him to Toprol XL and that he can take 1 tab daily (100 mg).     Due to his family situation, he is concerned about the urgency of things. We agreed that as long as he is asymptomatic, he is safe at home, however I would prioritize the Toprol  and labs next week if he can. He will  Toprol today and he agrees to labs Monday PM.     I did caution him of signs that he will need emergent evaluation and care, which include CXP, SOB, dizziness/lightheadedness, palpitations, pre-syncope, or syncope. He verbalized understanding and agreed to POC.     In the  meantime, I provided him with Team 3 RN # and asked him to please keep us in the loop with any changes/questions/concerns. He wrote down # and my name and agreed.     Messaged scheduling requesting that they arrange above.     EMANI GalavizN, RN, PHN, HNB-BC   12/17/2021 at 11:22 AM

## 2021-12-20 ENCOUNTER — LAB (OUTPATIENT)
Dept: LAB | Facility: CLINIC | Age: 61
End: 2021-12-20
Payer: COMMERCIAL

## 2021-12-20 DIAGNOSIS — I47.29 PAROXYSMAL VENTRICULAR TACHYCARDIA (H): ICD-10-CM

## 2021-12-20 LAB
ANION GAP SERPL CALCULATED.3IONS-SCNC: 4 MMOL/L (ref 3–14)
BUN SERPL-MCNC: 16 MG/DL (ref 7–30)
CALCIUM SERPL-MCNC: 9.3 MG/DL (ref 8.5–10.1)
CHLORIDE BLD-SCNC: 103 MMOL/L (ref 94–109)
CO2 SERPL-SCNC: 29 MMOL/L (ref 20–32)
CREAT SERPL-MCNC: 1.06 MG/DL (ref 0.66–1.25)
GFR SERPL CREATININE-BSD FRML MDRD: 75 ML/MIN/1.73M2
GLUCOSE BLD-MCNC: 81 MG/DL (ref 70–99)
MAGNESIUM SERPL-MCNC: 2.3 MG/DL (ref 1.6–2.3)
POTASSIUM BLD-SCNC: 4 MMOL/L (ref 3.4–5.3)
SODIUM SERPL-SCNC: 136 MMOL/L (ref 133–144)

## 2021-12-20 PROCEDURE — 83735 ASSAY OF MAGNESIUM: CPT

## 2021-12-20 PROCEDURE — 36415 COLL VENOUS BLD VENIPUNCTURE: CPT

## 2021-12-20 PROCEDURE — 80048 BASIC METABOLIC PNL TOTAL CA: CPT

## 2021-12-28 ENCOUNTER — HOSPITAL ENCOUNTER (OUTPATIENT)
Dept: CARDIOLOGY | Facility: CLINIC | Age: 61
End: 2021-12-28
Attending: INTERNAL MEDICINE
Payer: COMMERCIAL

## 2021-12-28 ENCOUNTER — MYC MEDICAL ADVICE (OUTPATIENT)
Dept: CARDIOLOGY | Facility: CLINIC | Age: 61
End: 2021-12-28

## 2021-12-28 ENCOUNTER — CARE COORDINATION (OUTPATIENT)
Dept: CARDIOLOGY | Facility: CLINIC | Age: 61
End: 2021-12-28

## 2021-12-28 VITALS
DIASTOLIC BLOOD PRESSURE: 90 MMHG | BODY MASS INDEX: 31.3 KG/M2 | HEART RATE: 88 BPM | WEIGHT: 223.6 LBS | OXYGEN SATURATION: 100 % | SYSTOLIC BLOOD PRESSURE: 148 MMHG | HEIGHT: 71 IN

## 2021-12-28 DIAGNOSIS — I47.29 PAROXYSMAL VENTRICULAR TACHYCARDIA (H): ICD-10-CM

## 2021-12-28 DIAGNOSIS — I10 HYPERTENSION GOAL BP (BLOOD PRESSURE) < 140/90: Primary | ICD-10-CM

## 2021-12-28 LAB
CV STRESS MAX HR HE: 141
RATE PRESSURE PRODUCT: NORMAL
STRESS ECHO BASELINE DIASTOLIC HE: 90
STRESS ECHO BASELINE HR: 88 BPM
STRESS ECHO BASELINE SYSTOLIC BP: 148
STRESS ECHO CALCULATED PERCENT HR: 89 %
STRESS ECHO LAST STRESS DIASTOLIC BP: 80
STRESS ECHO LAST STRESS SYSTOLIC BP: 124
STRESS ECHO TARGET HR: 158

## 2021-12-28 PROCEDURE — 78452 HT MUSCLE IMAGE SPECT MULT: CPT | Mod: 26 | Performed by: INTERNAL MEDICINE

## 2021-12-28 PROCEDURE — 93017 CV STRESS TEST TRACING ONLY: CPT

## 2021-12-28 PROCEDURE — 93018 CV STRESS TEST I&R ONLY: CPT | Performed by: INTERNAL MEDICINE

## 2021-12-28 PROCEDURE — A9502 TC99M TETROFOSMIN: HCPCS | Performed by: INTERNAL MEDICINE

## 2021-12-28 PROCEDURE — 250N000011 HC RX IP 250 OP 636: Performed by: INTERNAL MEDICINE

## 2021-12-28 PROCEDURE — 343N000001 HC RX 343: Performed by: INTERNAL MEDICINE

## 2021-12-28 RX ORDER — CAFFEINE CITRATE 20 MG/ML
60 SOLUTION INTRAVENOUS
Status: DISCONTINUED | OUTPATIENT
Start: 2021-12-28 | End: 2021-12-29 | Stop reason: HOSPADM

## 2021-12-28 RX ORDER — ACYCLOVIR 200 MG/1
0-1 CAPSULE ORAL
Status: DISCONTINUED | OUTPATIENT
Start: 2021-12-28 | End: 2021-12-29 | Stop reason: HOSPADM

## 2021-12-28 RX ORDER — ALBUTEROL SULFATE 90 UG/1
2 AEROSOL, METERED RESPIRATORY (INHALATION) EVERY 5 MIN PRN
Status: DISCONTINUED | OUTPATIENT
Start: 2021-12-28 | End: 2021-12-29 | Stop reason: HOSPADM

## 2021-12-28 RX ORDER — AMINOPHYLLINE 25 MG/ML
50-100 INJECTION, SOLUTION INTRAVENOUS
Status: DISCONTINUED | OUTPATIENT
Start: 2021-12-28 | End: 2021-12-29 | Stop reason: HOSPADM

## 2021-12-28 RX ORDER — REGADENOSON 0.08 MG/ML
0.4 INJECTION, SOLUTION INTRAVENOUS ONCE
Status: COMPLETED | OUTPATIENT
Start: 2021-12-28 | End: 2021-12-28

## 2021-12-28 RX ADMIN — TETROFOSMIN 11.58 MCI.: 1.38 INJECTION, POWDER, LYOPHILIZED, FOR SOLUTION INTRAVENOUS at 10:38

## 2021-12-28 RX ADMIN — TETROFOSMIN 4.05 MCI.: 1.38 INJECTION, POWDER, LYOPHILIZED, FOR SOLUTION INTRAVENOUS at 09:28

## 2021-12-28 RX ADMIN — REGADENOSON 0.4 MG: 0.08 INJECTION, SOLUTION INTRAVENOUS at 10:38

## 2021-12-28 ASSESSMENT — MIFFLIN-ST. JEOR: SCORE: 1836.37

## 2021-12-28 NOTE — PROGRESS NOTES
Received results from the [facility] today.      Sent Huodongxingt message to Rashawn notifying him about results.   Note that he does have an appointment  1\10\22  With provider Bibi Hunter.

## 2022-01-03 ENCOUNTER — CARE COORDINATION (OUTPATIENT)
Dept: CARDIOLOGY | Facility: CLINIC | Age: 62
End: 2022-01-03

## 2022-01-03 NOTE — TELEPHONE ENCOUNTER
Hi Dr. Hercules -- looks like Rashawn was orphaned from your schedule this morning. The appointment was set up to review abnormal [place] (ordered for evidence of VT on Zio). He'll see Bibi 1/10.     I sent him the following aDealio message and told him that if you would like any changes before his visit with Bibi, I will let him know.     Thanks!  Marisol Shen Rashawn!      I got a message from our scheduling team that Dr. Hercules was pulled to the hospital this morning. I read through your conversation with LINDA Mejias last week and I looked at your recent lexiscan results.      The results show just a small area of ischemia (this just means the blood flow to the left bottom chamber of your heart is a bit impaired). Fortunately, the pumping ability of that chamber is still normal, so that means it appears that your body is compensating.      Therefore, as long as you are continuing to feel well, I believe it would be reasonable to have you see Bibi Roman NP on 1/10/22 as planned.      In the meantime, we are available via aDealio and/or the phone ([phone]) during business hours, and our cardiologist on-call is available during all non-business hours ([phone], option 2).      Signs that your heart is needing some urgent or emergent help from us would include chest pain or  shortness of breath at rest, dizziness, lightheadedness, nausea/vomiting, excessive sweating, and/or feeling as though you may pass out. I don't expect these things to happen, but I just want you to know what to watch out for.      Please let me know how you feel about this plan. In the meantime, I'll update Dr. Hercules on your results and let you know if he has any other recommendations prior to your appointment next week.      Thanks,  LINDA Damon with Dr. Hercules

## 2022-01-03 NOTE — PROGRESS NOTES
Patient was supposed to be seen by Dr. Hercules this morning, related to his atrial fibrillation and recent changes in his Lexiscan results, which showed some ischemia in 2 areas of Left ventricle.  Patient is not having any symptoms today, and the appointment needed to be re-scheduled due to a conflict for Dr. Hercules needing to be pulled to hospital duty today.    Patient states he is feeling like he can wait until next Monday 1\10, when he has apmnt with Bibi Hunter already scheduled.   Writer requested staffer Elsi who contacted me, to let me know if the patient wants nurse to call him today.   Elsi spoke with the patient again, to get clarification, and patient told her he would be going  out of town for a few days.  He told Elsi that he was comfortable waiting until next week.

## 2022-01-10 ENCOUNTER — OFFICE VISIT (OUTPATIENT)
Dept: CARDIOLOGY | Facility: CLINIC | Age: 62
End: 2022-01-10
Attending: INTERNAL MEDICINE
Payer: COMMERCIAL

## 2022-01-10 VITALS
DIASTOLIC BLOOD PRESSURE: 80 MMHG | HEART RATE: 67 BPM | OXYGEN SATURATION: 99 % | BODY MASS INDEX: 31.22 KG/M2 | WEIGHT: 223 LBS | SYSTOLIC BLOOD PRESSURE: 124 MMHG | HEIGHT: 71 IN

## 2022-01-10 DIAGNOSIS — R94.39 ABNORMAL STRESS TEST: ICD-10-CM

## 2022-01-10 DIAGNOSIS — I47.29 PAROXYSMAL VENTRICULAR TACHYCARDIA (H): ICD-10-CM

## 2022-01-10 DIAGNOSIS — I47.29 NSVT (NONSUSTAINED VENTRICULAR TACHYCARDIA) (H): Primary | ICD-10-CM

## 2022-01-10 PROCEDURE — 99214 OFFICE O/P EST MOD 30 MIN: CPT | Performed by: NURSE PRACTITIONER

## 2022-01-10 PROCEDURE — 93000 ELECTROCARDIOGRAM COMPLETE: CPT | Performed by: NURSE PRACTITIONER

## 2022-01-10 RX ORDER — ACETAMINOPHEN 500 MG
500 TABLET ORAL DAILY
COMMUNITY
End: 2022-01-10

## 2022-01-10 RX ORDER — ROSUVASTATIN CALCIUM 10 MG/1
10 TABLET, COATED ORAL DAILY
COMMUNITY
Start: 2021-11-22 | End: 2022-09-27

## 2022-01-10 RX ORDER — CLONIDINE HYDROCHLORIDE 0.1 MG/1
0.1 TABLET ORAL EVERY MORNING
COMMUNITY
Start: 2021-10-25

## 2022-01-10 RX ORDER — LISINOPRIL 20 MG/1
20 TABLET ORAL DAILY
COMMUNITY
Start: 2021-11-28 | End: 2022-02-07 | Stop reason: DRUGHIGH

## 2022-01-10 ASSESSMENT — MIFFLIN-ST. JEOR: SCORE: 1825.71

## 2022-01-10 NOTE — PROGRESS NOTES
Cardiology Clinic Progress Note  Rashawn Short MRN# [id number]   YOB: 1960 Age: 61 year old     Primary cardiologist: Dr. Hercules    Reason for visit: Follow up Zio Patch and nuclear stress test    History of presenting illness:    Rashawn Short, a pleasant 61 year old patient who has a possible history significant for paroxysmal atrial fibrillation, hypertension, prior alcoholism (sober for 4 years)     He was previously evaluated by Dr. Hercules for atrial fibrillation and he underwent a FREDI guided cardioversion after starting on amiodarone in addition to metoprolol for rate control.  Unfortunately he did return to atrial fibrillation.  Echocardiogram shows LVEF of 55 to 60% without significant valvular disease and a mildly large left atrium.    Due to concerns for regular rhythm on exam by PCP a Zio patch was obtained showing that he was predominantly in atrial fibrillation with average heart rate of 96 bpm.  There was also 14 runs of  VT and the patient was asymptomatic.  A Lexiscan nuclear stress test was recommended noting a small area of ischemia in the mid to distal anterior segments of the LV and small area of ischemia in the mid to distal inferior segments of the LV.  Patient remains asymptomatic.     His blood pressure was initially elevated but he admitted to drinking additional caffeinated beverages today and was well controlled on stress testing.  He continues to be asymptomatic from a cardiovascular standpoint and was agreeable for further evaluation with a CT coronary angiogram.         Assessment and Plan:     ASSESSMENT:    1. Atrial fibrillation     IVG3CI5-DPIi score 1 (hypertension)    Currently not on anticoagulation    Rate controlled with heart rates in the 90s    2. Nonsustained VT    14 runs of VT noted on Zio patch and patient was asymptomatic    Nuclear stress test noted small area of ischemia in the mid distal anterior segments and the distal inferior segments of the  "LV    3. Hypertension    Initially elevated but improved    PLAN:     1. CT coronary angiogram-will call with results  2. Outpatient sleep study     Orders this Visit:  Orders Placed This Encounter   Procedures     SLEEP EVALUATION & MANAGEMENT REFERRAL - ADULT -     EKG 12-lead complete w/read - Clinics (performed today)     Orders Placed This Encounter   Medications     DISCONTD: acetaminophen (TYLENOL) 500 MG tablet     Sig: Take 500 mg by mouth daily     rosuvastatin (CRESTOR) 10 MG tablet     Sig: Take 10 mg by mouth daily     lisinopril (ZESTRIL) 20 MG tablet     Sig: Take 20 mg by mouth daily     cloNIDine (CATAPRES) 0.1 MG tablet     Sig: Take 0.1 mg by mouth 2 times daily     Medications Discontinued During This Encounter   Medication Reason     acetaminophen (TYLENOL) 500 MG tablet        Today's clinic visit entailed:  Review of the result(s) of each unique test - Zio Patch, EKG, Stress test  35 minutes spent on the date of the encounter doing chart review, history and exam, documentation and further activities per the note  Provider  Link to [employer] Help Grid     The level of medical decision making during this visit was of moderate complexity.           Review of Systems:     Review of Systems:  Skin:  Negative     Eyes:  Negative    ENT:  Negative    Respiratory:  Negative    Cardiovascular:  Negative    Gastroenterology: Negative    Genitourinary:  Negative    Musculoskeletal:  Negative    Neurologic:  Negative    Psychiatric:  Negative    Heme/Lymph/Imm:  Negative    Endocrine:  Negative              Physical Exam:   Vitals: /80   Pulse 67   Ht 1.791 m (5' 10.5\")   Wt 101.2 kg (223 lb)   SpO2 99%   BMI 31.54 kg/m    Constitutional:  cooperative        Skin:  warm and dry to the touch, no apparent skin lesions or masses noted        Head:  normocephalic, no masses or lesions        Eyes:  pupils equal and round        ENT:  no pallor or cyanosis        Neck:  JVP normal        Chest:  clear to " auscultation        Cardiac:   irregular rhythm                Abdomen:  abdomen soft        Vascular: pulses full and equal                                      Extremities and Back:  no edema        Neurological:  no gross motor deficits             Medications:     Current Outpatient Medications   Medication Sig Dispense Refill     amLODIPine (NORVASC) 2.5 MG tablet Take 1 tablet (2.5 mg) by mouth daily 90 tablet 3     aspirin 81 MG EC tablet Take 81 mg by mouth daily       cloNIDine (CATAPRES) 0.1 MG tablet Take 0.1 mg by mouth 2 times daily       fish oil-omega-3 fatty acids 1000 MG capsule Take 1 g by mouth daily       hydrochlorothiazide (HYDRODIURIL) 25 MG tablet Take 25 mg by mouth daily       lisinopril (ZESTRIL) 20 MG tablet Take 20 mg by mouth daily       metoprolol succinate ER (TOPROL-XL) 100 MG 24 hr tablet Take 1 tablet (100 mg) by mouth daily 90 tablet 3     multivitamin, therapeutic (THERA-VIT) TABS tablet Take 1 tablet by mouth daily       rosuvastatin (CRESTOR) 10 MG tablet Take 10 mg by mouth daily       thiamine (B-1) 100 MG tablet Take 100 mg by mouth daily         Family History   Problem Relation Age of Onset     Arrhythmia Father         a-fib     Colon Polyps Sister      C.A.D. No family hx of      Diabetes No family hx of      Hypertension No family hx of      Cerebrovascular Disease No family hx of      Breast Cancer No family hx of      Cancer - colorectal No family hx of      Prostate Cancer No family hx of        Social History     Socioeconomic History     Marital status:      Spouse name: Not on file     Number of children: Not on file     Years of education: Not on file     Highest education level: Not on file   Occupational History     Not on file   Tobacco Use     Smoking status: Never Smoker     Smokeless tobacco: Never Used   Substance and Sexual Activity     Alcohol use: Not Currently     Comment: no alcolhol 2017     Drug use: No     Sexual activity: Yes     Partners:  Female   Other Topics Concern     Parent/sibling w/ CABG, MI or angioplasty before 65F 55M? Not Asked   Social History Narrative     Not on file     Social Determinants of Health     Financial Resource Strain: Not on file   Food Insecurity: Not on file   Transportation Needs: Not on file   Physical Activity: Not on file   Stress: Not on file   Social Connections: Not on file   Intimate Partner Violence: Not on file   Housing Stability: Not on file            Past Medical History:     Past Medical History:   Diagnosis Date     Atrial fibrillation with RVR (H) 12/28/2020     Depressive disorder      EtOH dependence (H)      Hypertension      Paroxysmal atrial fibrillation (H) 01/29/2010              Past Surgical History:     Past Surgical History:   Procedure Laterality Date     ANESTHESIA CARDIOVERSION N/A 1/11/2021    Procedure: ANESTHESIA, FOR CARDIOVERSION;  Surgeon: GENERIC ANESTHESIA PROVIDER;  Location:  OR     ORTHOPEDIC SURGERY  1998    ACL Right knee reconstruction-               Allergies:   Patient has no known allergies.       Data:   All laboratory data reviewed:    Recent Labs   Lab Test 12/28/20  0946   TSH 3.39       Lab Results   Component Value Date    WBC 8.2 12/29/2020    RBC 5.60 12/29/2020    HGB 16.5 12/29/2020    HCT 49.8 12/29/2020    MCV 89 12/29/2020    MCH 29.5 12/29/2020    MCHC 33.1 12/29/2020    RDW 13.7 12/29/2020     12/29/2020       Lab Results   Component Value Date     12/20/2021     12/29/2020    POTASSIUM 4.0 12/20/2021    POTASSIUM 3.7 01/11/2021    CHLORIDE 103 12/20/2021    CHLORIDE 108 12/29/2020    CO2 29 12/20/2021    CO2 26 12/29/2020    ANIONGAP 4 12/20/2021    ANIONGAP 3 12/29/2020    GLC 81 12/20/2021    GLC 86 12/29/2020    BUN 16 12/20/2021    BUN 15 12/29/2020    CR 1.06 12/20/2021    CR 0.98 12/29/2020    GFRESTIMATED 75 12/20/2021    GFRESTIMATED 82 12/29/2020    GFRESTBLACK >90 12/29/2020    KELSEY 9.3 12/20/2021    KELSEY 8.6 12/29/2020      Lab  Results   Component Value Date    AST 50 (H) 12/28/2020    ALT 98 (H) 12/28/2020       No results found for: A1C    Lab Results   Component Value Date    INR 1.74 (H) 01/11/2021    INR 0.98 12/28/2020         PIPE GARCIA [facility]  Pager: [phone]  RN phone: [phone]

## 2022-01-10 NOTE — PATIENT INSTRUCTIONS
Today's Recommendations    1. CT coronary angiogram  2. Continue all medications without changes.    Please send a [employer] message or call [phone] with questions or concerns.     Scheduling number [phone].

## 2022-01-10 NOTE — LETTER
1/10/2022    Ludwig Carrillo MD  [address]  [facility] [address]    RE: Rashawn Short       Dear Colleague,     I had the [facility] of seeing [facility] the [facility].    Cardiology Clinic Progress Note  Rashawn Short MRN# [id number]   YOB: 1960 Age: 61 year old     Primary cardiologist: Dr. Hercules    Reason for visit: Follow up Zio Patch and nuclear stress test    History of presenting illness:    Rashawn Short, a pleasant 61 year old patient who has a possible history significant for paroxysmal atrial fibrillation, hypertension, prior alcoholism (sober for 4 years)     He was previously evaluated by Dr. Hercules for atrial fibrillation and he underwent a FREDI guided cardioversion after starting on amiodarone in addition to metoprolol for rate control.  Unfortunately he did return to atrial fibrillation.  Echocardiogram shows LVEF of 55 to 60% without significant valvular disease and a mildly large left atrium.    Due to concerns for regular rhythm on exam by PCP a Zio patch was obtained showing that he was predominantly in atrial fibrillation with average heart rate of 96 bpm.  There was also 14 runs of  VT and the patient was asymptomatic.  A Lexiscan nuclear stress test was recommended noting a small area of ischemia in the mid to distal anterior segments of the LV and small area of ischemia in the mid to distal inferior segments of the LV.  Patient remains asymptomatic.     His blood pressure was initially elevated but he admitted to drinking additional caffeinated beverages today and was well controlled on stress testing.  He continues to be asymptomatic from a cardiovascular standpoint and was agreeable for further evaluation with a CT coronary angiogram.         Assessment and Plan:     ASSESSMENT:    1. Atrial fibrillation     [id number] score 1 (hypertension)    Currently not on anticoagulation    Rate controlled with heart rates in the  "90s    2. Nonsustained VT    14 runs of VT noted on Zio patch and patient was asymptomatic    Nuclear stress test noted small area of ischemia in the mid distal anterior segments and the distal inferior segments of the LV    3. Hypertension    Initially elevated but improved    PLAN:     1. CT coronary angiogram-will call with results  2. Outpatient sleep study     Orders this Visit:  Orders Placed This Encounter   Procedures     SLEEP EVALUATION & MANAGEMENT REFERRAL - ADULT -     EKG 12-lead complete w/read - Clinics (performed today)     Orders Placed This Encounter   Medications     DISCONTD: acetaminophen (TYLENOL) 500 MG tablet     Sig: Take 500 mg by mouth daily     rosuvastatin (CRESTOR) 10 MG tablet     Sig: Take 10 mg by mouth daily     lisinopril (ZESTRIL) 20 MG tablet     Sig: Take 20 mg by mouth daily     cloNIDine (CATAPRES) 0.1 MG tablet     Sig: Take 0.1 mg by mouth 2 times daily     Medications Discontinued During This Encounter   Medication Reason     acetaminophen (TYLENOL) 500 MG tablet        Today's clinic visit entailed:  Review of the result(s) of each unique test - Zio Patch, EKG, Stress test  35 minutes spent on the date of the encounter doing chart review, history and exam, documentation and further activities per the note  Provider  Link to [employer] Help Grid     The level of medical decision making during this visit was of moderate complexity.           Review of Systems:     Review of Systems:  Skin:  Negative     Eyes:  Negative    ENT:  Negative    Respiratory:  Negative    Cardiovascular:  Negative    Gastroenterology: Negative    Genitourinary:  Negative    Musculoskeletal:  Negative    Neurologic:  Negative    Psychiatric:  Negative    Heme/Lymph/Imm:  Negative    Endocrine:  Negative              Physical Exam:   Vitals: /80   Pulse 67   Ht 1.791 m (5' 10.5\")   Wt 101.2 kg (223 lb)   SpO2 99%   BMI 31.54 kg/m    Constitutional:  cooperative        Skin:  warm and dry to the " touch, no apparent skin lesions or masses noted        Head:  normocephalic, no masses or lesions        Eyes:  pupils equal and round        ENT:  no pallor or cyanosis        Neck:  JVP normal        Chest:  clear to auscultation        Cardiac:   irregular rhythm                Abdomen:  abdomen soft        Vascular: pulses full and equal                                      Extremities and Back:  no edema        Neurological:  no gross motor deficits             Medications:     Current Outpatient Medications   Medication Sig Dispense Refill     amLODIPine (NORVASC) 2.5 MG tablet Take 1 tablet (2.5 mg) by mouth daily 90 tablet 3     aspirin 81 MG EC tablet Take 81 mg by mouth daily       cloNIDine (CATAPRES) 0.1 MG tablet Take 0.1 mg by mouth 2 times daily       fish oil-omega-3 fatty acids 1000 MG capsule Take 1 g by mouth daily       hydrochlorothiazide (HYDRODIURIL) 25 MG tablet Take 25 mg by mouth daily       lisinopril (ZESTRIL) 20 MG tablet Take 20 mg by mouth daily       metoprolol succinate ER (TOPROL-XL) 100 MG 24 hr tablet Take 1 tablet (100 mg) by mouth daily 90 tablet 3     multivitamin, therapeutic (THERA-VIT) TABS tablet Take 1 tablet by mouth daily       rosuvastatin (CRESTOR) 10 MG tablet Take 10 mg by mouth daily       thiamine (B-1) 100 MG tablet Take 100 mg by mouth daily         Family History   Problem Relation Age of Onset     Arrhythmia Father         a-fib     Colon Polyps Sister      C.A.D. No family hx of      Diabetes No family hx of      Hypertension No family hx of      Cerebrovascular Disease No family hx of      Breast Cancer No family hx of      Cancer - colorectal No family hx of      Prostate Cancer No family hx of        Social History     Socioeconomic History     Marital status:      Spouse name: Not on file     Number of children: Not on file     Years of education: Not on file     Highest education level: Not on file   Occupational History     Not on file   Tobacco  Use     Smoking status: Never Smoker     Smokeless tobacco: Never Used   Substance and Sexual Activity     Alcohol use: Not Currently     Comment: no alcolhol 2017     Drug use: No     Sexual activity: Yes     Partners: Female   Other Topics Concern     Parent/sibling w/ CABG, MI or angioplasty before 65F 55M? Not Asked   Social History Narrative     Not on file     Social Determinants of Health     Financial Resource Strain: Not on file   Food Insecurity: Not on file   Transportation Needs: Not on file   Physical Activity: Not on file   Stress: Not on file   Social Connections: Not on file   Intimate Partner Violence: Not on file   Housing Stability: Not on file            Past Medical History:     Past Medical History:   Diagnosis Date     Atrial fibrillation with RVR (H) 12/28/2020     Depressive disorder      EtOH dependence (H)      Hypertension      Paroxysmal atrial fibrillation (H) 01/29/2010              Past Surgical History:     Past Surgical History:   Procedure Laterality Date     ANESTHESIA CARDIOVERSION N/A 1/11/2021    Procedure: ANESTHESIA, FOR CARDIOVERSION;  Surgeon: GENERIC ANESTHESIA PROVIDER;  Location:  OR     ORTHOPEDIC SURGERY  1998    ACL Right knee reconstruction-               Allergies:   Patient has no known allergies.       Data:   All laboratory data reviewed:    Recent Labs   Lab Test 12/28/20  0946   TSH 3.39       Lab Results   Component Value Date    WBC 8.2 12/29/2020    RBC 5.60 12/29/2020    HGB 16.5 12/29/2020    HCT 49.8 12/29/2020    MCV 89 12/29/2020    MCH 29.5 12/29/2020    MCHC 33.1 12/29/2020    RDW 13.7 12/29/2020     12/29/2020       Lab Results   Component Value Date     12/20/2021     12/29/2020    POTASSIUM 4.0 12/20/2021    POTASSIUM 3.7 01/11/2021    CHLORIDE 103 12/20/2021    CHLORIDE 108 12/29/2020    CO2 29 12/20/2021    CO2 26 12/29/2020    ANIONGAP 4 12/20/2021    ANIONGAP 3 12/29/2020    GLC 81 12/20/2021    GLC 86 12/29/2020    BUN 16  12/20/2021    BUN 15 12/29/2020    CR 1.06 12/20/2021    CR 0.98 12/29/2020    GFRESTIMATED 75 12/20/2021    GFRESTIMATED 82 12/29/2020    GFRESTBLACK >90 12/29/2020    KELSEY 9.3 12/20/2021    KELSEY 8.6 12/29/2020      Lab Results   Component Value Date    AST 50 (H) 12/28/2020    ALT 98 (H) 12/28/2020       No results found for: A1C    Lab Results   Component Value Date    INR 1.74 (H) 01/11/2021    INR 0.98 12/28/2020         PIPE GARCIA [facility]  Pager: [phone]  RN phone: [phone]      cc:   Austin Hercules MD  [address]  [place] [address]

## 2022-01-19 ENCOUNTER — TELEPHONE (OUTPATIENT)
Dept: CARDIOLOGY | Facility: CLINIC | Age: 62
End: 2022-01-19
Payer: COMMERCIAL

## 2022-01-19 NOTE — TELEPHONE ENCOUNTER
REJI received from CT department, stating that the patient's CTA coronary artery was cancelled today due to patient being in afib. Per Dr. Leonard, CT scan would be non diagnostic due to afib. Will route to Bibi for review.

## 2022-01-20 ENCOUNTER — MYC MEDICAL ADVICE (OUTPATIENT)
Dept: CARDIOLOGY | Facility: CLINIC | Age: 62
End: 2022-01-20
Payer: COMMERCIAL

## 2022-01-24 NOTE — TELEPHONE ENCOUNTER
Dr. Hercules-    Mr. Short is had an abnormal stress test prompted by incidentally finding runs of VT on a Zio Patch. He is completely asymptomatic and could not obtain a CT as he was in atrial fibrillation at the time of the study.     Should we proceed with an invasive angiogram or continue medical management?     Thanks    Bibi

## 2022-01-25 NOTE — TELEPHONE ENCOUNTER
RN updated patient via [employer].       Dr. Hercules's recommendations.     Manage him conservatively for now if he is asymptomatic. Try getting a CT angio at some point. They should still be able to do it if his HR is well controlled. If it shows significant dx in prox LAD, cath him. Otherwise, continue conservative management.

## 2022-02-07 RX ORDER — LISINOPRIL 10 MG/1
10 TABLET ORAL DAILY
Qty: 90 TABLET | Refills: 3 | Status: SHIPPED | OUTPATIENT
Start: 2022-02-07 | End: 2023-09-29

## 2022-02-07 NOTE — TELEPHONE ENCOUNTER
Please have him decrease Lisinopril to 10 mg daily and continue to monitor.     Thanks    PIPE Pearson, CNP

## 2022-02-20 ENCOUNTER — HEALTH MAINTENANCE LETTER (OUTPATIENT)
Age: 62
End: 2022-02-20

## 2022-03-07 ENCOUNTER — MYC MEDICAL ADVICE (OUTPATIENT)
Dept: CARDIOLOGY | Facility: CLINIC | Age: 62
End: 2022-03-07
Payer: COMMERCIAL

## 2022-03-13 ASSESSMENT — SLEEP AND FATIGUE QUESTIONNAIRES
HOW LIKELY ARE YOU TO NOD OFF OR FALL ASLEEP WHEN YOU ARE A PASSENGER IN A CAR FOR AN HOUR WITHOUT A BREAK: WOULD NEVER DOZE
HOW LIKELY ARE YOU TO NOD OFF OR FALL ASLEEP IN A CAR, WHILE STOPPED FOR A FEW MINUTES IN TRAFFIC: WOULD NEVER DOZE
HOW LIKELY ARE YOU TO NOD OFF OR FALL ASLEEP WHILE SITTING AND TALKING TO SOMEONE: WOULD NEVER DOZE
HOW LIKELY ARE YOU TO NOD OFF OR FALL ASLEEP WHILE WATCHING TV: WOULD NEVER DOZE
HOW LIKELY ARE YOU TO NOD OFF OR FALL ASLEEP WHILE SITTING AND READING: WOULD NEVER DOZE
HOW LIKELY ARE YOU TO NOD OFF OR FALL ASLEEP WHILE SITTING QUIETLY AFTER LUNCH WITHOUT ALCOHOL: WOULD NEVER DOZE
HOW LIKELY ARE YOU TO NOD OFF OR FALL ASLEEP WHILE SITTING INACTIVE IN A PUBLIC PLACE: WOULD NEVER DOZE
HOW LIKELY ARE YOU TO NOD OFF OR FALL ASLEEP WHILE LYING DOWN TO REST IN THE AFTERNOON WHEN CIRCUMSTANCES PERMIT: WOULD NEVER DOZE

## 2022-03-16 ENCOUNTER — OFFICE VISIT (OUTPATIENT)
Dept: SLEEP MEDICINE | Facility: CLINIC | Age: 62
End: 2022-03-16
Payer: COMMERCIAL

## 2022-03-16 VITALS
DIASTOLIC BLOOD PRESSURE: 117 MMHG | BODY MASS INDEX: 31.92 KG/M2 | WEIGHT: 223 LBS | SYSTOLIC BLOOD PRESSURE: 150 MMHG | HEIGHT: 70 IN | OXYGEN SATURATION: 99 %

## 2022-03-16 DIAGNOSIS — R29.818 SUSPECTED SLEEP APNEA: Primary | ICD-10-CM

## 2022-03-16 DIAGNOSIS — I47.29 NSVT (NONSUSTAINED VENTRICULAR TACHYCARDIA) (H): ICD-10-CM

## 2022-03-16 DIAGNOSIS — I47.29 PAROXYSMAL VENTRICULAR TACHYCARDIA (H): ICD-10-CM

## 2022-03-16 DIAGNOSIS — I10 PRIMARY HYPERTENSION: ICD-10-CM

## 2022-03-16 DIAGNOSIS — R94.39 ABNORMAL STRESS TEST: ICD-10-CM

## 2022-03-16 DIAGNOSIS — I48.0 PAROXYSMAL A-FIB (H): ICD-10-CM

## 2022-03-16 PROCEDURE — 99204 OFFICE O/P NEW MOD 45 MIN: CPT | Performed by: INTERNAL MEDICINE

## 2022-03-16 NOTE — PROGRESS NOTES
"Presenting History:     Mr. Rashawn Short is a  61 years old male with history of hypertension, paroxysmal atrial fibrillation & alcohol dependence, in remission. He has been sober for last 5 years and  lives in a sober living facility.  Patient had a previous career in [employer] and remains in regular contact with his wife and 3 children..    Patient was sent for a sleep evaluation by cardiology.  Recent Ziopatch monitoring showed asymptomatic runs of nonsustained VT.     Patient does not have any subjective complaints regarding his sleep. He goes to bed at 9:30 PM. He reads in bed and can fall asleep without difficulty at 10 PM when he switches lights off. He wakes up at 6:15 AM without an alarm. He wakes up 0-1 times per night to use the bathroom and is . He takes Diphenhydramine 50 mg at bedtime.     Patient doesnot have a regular bedpartner,. He doesnot think that he snores. He denies any gapsing or choking episodes in sleep. He denies knowledge of witenssed apneas.  He denies history of morning headaches.    He deneis slepe walking, dream enactment, sleep paralysis or hypnagogic/ hypnopompic hallucinations.     He denies any significant sleepiness during the day.  He doesnot take any naps. [place] sleepiness score is 0.     He denies any drowsiness when driving.     He has 3-4 caffineated sodas daily.     Past Medical History:   Diagnosis Date     Atrial fibrillation with RVR (H) 12/28/2020     Depressive disorder      EtOH dependence (H)      Hypertension      Paroxysmal atrial fibrillation (H) 01/29/2010     Exam:  Constitutional: healthy, alert and no distress  ENT: Mallampati class IV  Psychiatric: mentation appears normal and affect normal/bright      BP (!) 150/117   Ht 1.778 m (5' 10\")   Wt 101.2 kg (223 lb)   SpO2 99%   BMI 32.00 kg/m        Impression & Plan:     1. To rule out obstructive sleep apnea    There is an intermediate risk for sleep apnea based on demographic risk factors and " exam. Patient denies significant subjective symptoms, but considering lack of regular bed partner, we may not have an accurate assessment of sleep apnea symptoms. Considering his cardiac comorbidity, an overnight sleep study is recommended to assess if there is clinically significant sleep disordered breathing.    Plan:     1. Home sleep apnea testing   2. Follow up after sleep study to review result     I spent a total of 45 minutes for this appointment on this date of service which include time spent before, during and after the visit for chart review, patient care, counseling and coordination of care.    Dr. Osvaldo Morocho

## 2022-03-16 NOTE — NURSING NOTE
"Chief Complaint   Patient presents with     Sleep Problem     Referred from Cardiology       Initial BP (!) 150/117   Ht 1.778 m (5' 10\")   Wt 101.2 kg (223 lb)   SpO2 99%   BMI 32.00 kg/m   Estimated body mass index is 32 kg/m  as calculated from the following:    Height as of this encounter: 1.778 m (5' 10\").    Weight as of this encounter: 101.2 kg (223 lb).    Medication Reconciliation: complete  ESS 0  Neck circumference: 42 centimeters.  Patsy Grant MA  "

## 2022-05-03 ENCOUNTER — OFFICE VISIT (OUTPATIENT)
Dept: URGENT CARE | Facility: URGENT CARE | Age: 62
End: 2022-05-03
Payer: COMMERCIAL

## 2022-05-03 VITALS
SYSTOLIC BLOOD PRESSURE: 154 MMHG | TEMPERATURE: 98 F | DIASTOLIC BLOOD PRESSURE: 94 MMHG | OXYGEN SATURATION: 98 % | HEART RATE: 84 BPM

## 2022-05-03 DIAGNOSIS — H60.391 INFECTIVE OTITIS EXTERNA, RIGHT: Primary | ICD-10-CM

## 2022-05-03 PROCEDURE — 99203 OFFICE O/P NEW LOW 30 MIN: CPT

## 2022-05-03 RX ORDER — CIPROFLOXACIN AND DEXAMETHASONE 3; 1 MG/ML; MG/ML
4 SUSPENSION/ DROPS AURICULAR (OTIC) 2 TIMES DAILY
Qty: 5 ML | Refills: 0 | Status: SHIPPED | OUTPATIENT
Start: 2022-05-03 | End: 2022-05-10

## 2022-05-04 NOTE — PROGRESS NOTES
Assessment & Plan     Infective otitis externa, right    - ciprofloxacin-dexamethasone (CIPRODEX) 0.3-0.1 % otic suspension; Place 4 drops into the right ear 2 times daily for 7 days  Recommend refraining from submerging his head underwater for the next week    15 minutes spent on the date of the encounter doing chart review, patient visit and documentation       See Patient Instructions    Return in about 4 days (around 5/7/2022), or if symptoms worsen or fail to improve.    [facility] Urgent Care Provider  [facility] Morocho is a 61 year old who presents for the following health issues     HPI     61-year-old male presents to urgent care for an ear check.  For the past week has had his right ear ringing and feeling blocked.  Some mild pain, no drainage.  Left ear is unaffected.  Swims every day at [employer].  Has had swimmer's ear in the past and this feels similar to him.  No fever, cough or runny nose.  No sore throat.    Review of Systems   Constitutional, HEENT, cardiovascular, pulmonary, gi and gu systems are negative, except as otherwise noted.      Objective    BP (!) 154/94 (BP Location: Right arm, Patient Position: Sitting, Cuff Size: Adult Large)   Pulse 84   Temp 98  F (36.7  C) (Oral)   SpO2 98%   There is no height or weight on file to calculate BMI.  Physical Exam   GENERAL: healthy, alert and no distress  EYES: Eyes grossly normal to inspection, PERRL and conjunctivae and sclerae normal  HENT: normal cephalic/atraumatic, right ear: purulent drainage in canal and red and boggy canal, left ear: normal: no effusions, no erythema, normal landmarks, nose and mouth without ulcers or lesions, oropharynx clear and oral mucous membranes moist  NECK: no adenopathy  RESP: lungs clear to auscultation - no rales, rhonchi or wheezes  CV: regular rates and rhythm, normal S1 S2, no S3 or S4 and no murmur, click or rub

## 2022-05-24 DIAGNOSIS — I48.0 PAROXYSMAL ATRIAL FIBRILLATION (H): ICD-10-CM

## 2022-05-24 RX ORDER — AMLODIPINE BESYLATE 2.5 MG/1
2.5 TABLET ORAL DAILY
Qty: 90 TABLET | Refills: 1 | Status: SHIPPED | OUTPATIENT
Start: 2022-05-24 | End: 2022-11-17

## 2022-05-25 NOTE — DISCHARGE SUMMARY
[facility]    Discharge Summary  Hospitalist    Date of Admission:  12/28/2020  Date of Discharge:  12/29/2020  Discharging Provider: Daphney Mendoza    Discharge Diagnoses   Afib with RVR  Hypertension    History of Present Illness   Rashawn Short is an 60 year old male with PMHx of hypertension, remote afib in 2017 for which he took metoprolol and hx of alcohol use presently 3 years sober who was admitted for evaluation of afib with RVR which was incidentally noted prior to an outpatient colonoscopy.     Hospital Course   Rashawn Short was admitted on 12/28/2020.  The following problems were addressed during his hospitalization:    Afib with RVR: Improved  Has remote hx of paroxysmal afib which was managed with metoprolol. No longer takes metoprolol. Presented for routine outpatient colonoscopy on 12/28 and was found to be in rapid afib with -160s. PAtient was asymptomatic. No reported cp, palpitations, dizziness/lightheadedness or sob. Referred to ED. Was started on diltiazem gtt. Initial workup unrevealing. Admitted to [facility] [place]. Serial trops neg. TSH nl. Echo showed EF 55% with borderline global LV hypokinesia, no significant valve disease.  Transitioned from dilt gtt to metoprolol XL. HRs improved to 80s - low 100s. Does titrated to 75mg BID with improvement. Discharged with cardiac monitor for ongoing assessment of rate control. CHADS2 VASc score is 1 -- decided to cont full dose asa as he had been taking prior to admission. Can revisit with PCP whether to transition to anticoagulation.     Lisinopril and hydrochlorothiazide held at discharge given initiation of metoprolol. Recommended to monitor BPs daily to determine if meds need to be resumed.     Will follow up with PCP in the next week and reschedule screening colonoscopy.     Daphney Mendoza, DO    Code Status   Full Code       Primary Care Physician   Ludwig Carrillo    Physical Exam   Temp: 97.9  F (36.6   C) Temp src: Oral BP: (!) 147/105 Pulse: 88   Resp: 18 SpO2: 95 % O2 Device: None (Room air)    Vitals:    12/28/20 0936 12/28/20 1253 12/29/20 0609   Weight: 94.3 kg (208 lb) 100.3 kg (221 lb 1.6 oz) 99.2 kg (218 lb 11.2 oz)     Vital Signs with Ranges  Temp:  [97.9  F (36.6  C)-98.7  F (37.1  C)] 97.9  F (36.6  C)  Pulse:  [] 88  Resp:  [18] 18  BP: (100-149)/() 147/105  SpO2:  [95 %-98 %] 95 %  I/O last 3 completed shifts:  In: 683.33 [P.O.:400; I.V.:283.33]  Out: -     General: Resting comfortably, alert, conversive, NAD  CVS: HR irregular, no MGR, no LE edema  Respiratory: CTAB, no wheeze/rales/rhonchi, no increased work of breathing  GI: S, NT, ND, +BS  Skin: Warm/dry  Neuro: Cns 2-12 intact, no focal motor/sensory deficits    Discharge Disposition   Discharged to home  Condition at discharge: Stable    Consultations This Hospital Stay   None    Time Spent on this Encounter   IDaphney DO, personally saw the patient today and spent greater than 30 minutes discharging this patient.    Discharge Orders      Reason for your hospital stay    Evaluation of your abnormal heart rhythm (called atrial fibrillation with rapid ventricular response). You were started on a new medication (called metoprolol) to help slow your heart rate.     Activity    Your activity upon discharge: activity as tolerated     Follow-up and recommended labs and tests     Follow up with your PCP in the next week. No labs needed  Dr. [facility] will reschedule your colonoscopy.  Check your blood pressures daily and document readings, bring with to your PCP follow up.     Zio Patch Holter Adult Pediatric Greater than 48 hrs     Diet    Follow this diet upon discharge: Regular     Discharge Medications   Current Discharge Medication List      START taking these medications    Details   metoprolol succinate ER (TOPROL-XL) 25 MG 24 hr tablet Take 3 tablets (75 mg) by mouth 2 times daily  Qty: 180 tablet,  Refills: 0    Associated Diagnoses: Atrial fibrillation with RVR (H)         CONTINUE these medications which have NOT CHANGED    Details   aspirin 325 MG EC tablet Take 1 tablet (325 mg) by mouth daily  Qty: 3 tablet, Refills: 0      fish oil-omega-3 fatty acids 1000 MG capsule Take 1 g by mouth daily      multivitamin, therapeutic (THERA-VIT) TABS tablet Take 1 tablet by mouth daily      thiamine (B-1) 100 MG tablet Take 100 mg by mouth daily         STOP taking these medications       hydrochlorothiazide (HYDRODIURIL) 25 MG tablet Comments:   Reason for Stopping:         lisinopril (ZESTRIL) 20 MG tablet Comments:   Reason for Stopping:             Allergies   No Known Allergies     Data   Most Recent 3 CBC's:  Recent Labs   Lab Test 12/29/20  0653 12/28/20  0946 07/28/19  1005   WBC 8.2 7.5 5.9   HGB 16.5 17.3 16.2   MCV 89 89 88    240 191      Most Recent 3 BMP's:  Recent Labs   Lab Test 12/29/20  0653 12/28/20  0946 07/28/19  1005    136 139   POTASSIUM 4.1 3.7 3.6   CHLORIDE 108 103 109   CO2 26 30 24   BUN 15 13 13   CR 0.98 1.07 0.94   ANIONGAP 3 3 6   KELSEY 8.6 8.8 8.7   GLC 86 98 92     Most Recent 2 LFT's:  Recent Labs   Lab Test 12/28/20  0946 01/03/17  0747   AST 50* 48*   ALT 98* 43   ALKPHOS 76 73   BILITOTAL 0.6 0.9     Most Recent INR's and Anticoagulation Dosing History:  Anticoagulation Dose History     Recent Dosing and Labs Latest Ref Rng & Units 12/28/2020    INR 0.86 - 1.14 0.98        Most Recent 3 Troponin's:  Recent Labs   Lab Test 12/28/20  2132 12/28/20  1507 12/28/20  0946   TROPI <0.015 <0.015 <0.015     Most Recent TSH, T4 and A1c Labs:  Recent Labs   Lab Test 12/28/20  0946   TSH 3.39     Results for orders placed or performed during the hospital encounter of 12/28/20   XR Chest Port 1 View    Narrative    CHEST PORTABLE ONE VIEW   12/28/2020 10:25 AM     HISTORY: Atrial fibrillation and right ventricular regurgitation.    COMPARISON: Chest CT on 1/27/2010      Impression     IMPRESSION: Single portable AP view of the chest was obtained. Mild  enlargement of the cardiac silhouette. No significant pleural effusion  or pneumothorax. No suspicious focal pulmonary opacities.    AUSTIN CREWS MD   Echocardiogram Complete    Narrative    [id number]  [id number]  [id number]  [id number]^ROMMEL^EVA           [facility]  Echocardiography Laboratory  [address] [place] [address]        Name: LATRICE ROME  MRN: [id number]  : 1960  Study Date: 2020 02:22 PM  Age: 60 yrs  Gender: Male  Patient Location: [facility]  Reason For Study: Atrial Fibrillation  Ordering Physician: EVA CARNEY  Referring Physician: Ludwig Carrillo  Performed By: Ingrid Young     BSA: 2.1 m2  Height: 70 in  Weight: 208 lb  HR: 86  BP: 119/89 mmHg  _____________________________________________________________________________  __        Procedure  Complete Portable Echo Adult. Optison (NDC #[id number]) given intravenously.  _____________________________________________________________________________  __        Interpretation Summary     There is borderline concentric left ventricular hypertrophy.  The visual ejection fraction is estimated at 55%.  There is borderline global hypokinesia of the left ventricle.  There is mild biatrial enlargement.  The ascending aorta is Mildly dilated.  _____________________________________________________________________________  __        Left Ventricle  The left ventricle is normal in size. There is borderline concentric left  ventricular hypertrophy. The visual ejection fraction is estimated at 55%.  Diastolic function not assessed due to arrhythmia. Diastolic Doppler findings  (E/E' ratio and/or other parameters) suggest left ventricular filling  pressures are indeterminate. There is borderline global hypokinesia of the  left ventricle.     Right Ventricle  The right ventricle is normal in size and function.     Atria  There is mild biatrial  enlargement.     Mitral Valve  There is physiologic mitral regurgitation.        Tricuspid Valve  There is trace tricuspid regurgitation. The right ventricular systolic  pressure is approximated at 21.4 mmHg plus the right atrial pressure. Doppler  findings do not suggest pulmonary hypertension.     Aortic Valve  There is trivial aortic sclerosis of the non-coronary cusp.     Pulmonic Valve  The pulmonic valve is not well seen, but is grossly normal.     Vessels  The ascending aorta is Mildly dilated. IVC diameter <2.1 cm collapsing >50%  with sniff suggests a normal RA pressure of 3 mmHg.     Pericardium  The pericardium appears normal.        Rhythm  The rhythm was atrial fibrillation with controlled ventricular rate at rest.  _____________________________________________________________________________  __  MMode/2D Measurements & Calculations  IVSd: 1.3 cm     LVIDd: 4.7 cm  LVIDs: 3.3 cm  LVPWd: 1.1 cm  FS: 29.7 %  LV mass(C)d: 204.1 grams  LV mass(C)dI: 96.2 grams/m2  Ao root diam: 3.3 cm  LA dimension: 4.6 cm  asc Aorta Diam: 4.0 cm  LA/Ao: 1.4  LVOT diam: 2.2 cm  LVOT area: 3.9 cm2  LA Volume (BP): 69.6 ml  LA Volume Index (BP): 32.8 ml/m2  RWT: 0.45           Doppler Measurements & Calculations  MV E max trever: 95.3 cm/sec  MV dec slope: 553.9 cm/sec2  PA acc time: 0.09 sec  TR max trever: 230.8 cm/sec  TR max P.4 mmHg  E/E' av.9  Lateral E/e': 11.2  Medial E/e': 8.7           _____________________________________________________________________________  __           Report approved by: Devin Castillo 2020 04:09 PM             Dr Narvaez

## 2022-06-22 ENCOUNTER — OFFICE VISIT (OUTPATIENT)
Dept: SLEEP MEDICINE | Facility: CLINIC | Age: 62
End: 2022-06-22
Payer: COMMERCIAL

## 2022-06-22 DIAGNOSIS — I47.29 PAROXYSMAL VENTRICULAR TACHYCARDIA (H): ICD-10-CM

## 2022-06-22 DIAGNOSIS — I10 PRIMARY HYPERTENSION: ICD-10-CM

## 2022-06-22 DIAGNOSIS — R29.818 SUSPECTED SLEEP APNEA: ICD-10-CM

## 2022-06-22 DIAGNOSIS — I48.0 PAROXYSMAL A-FIB (H): ICD-10-CM

## 2022-06-22 PROCEDURE — G0399 HOME SLEEP TEST/TYPE 3 PORTA: HCPCS | Performed by: INTERNAL MEDICINE

## 2022-06-23 ENCOUNTER — DOCUMENTATION ONLY (OUTPATIENT)
Dept: SLEEP MEDICINE | Facility: CLINIC | Age: 62
End: 2022-06-23
Payer: COMMERCIAL

## 2022-06-23 NOTE — PROGRESS NOTES
This HSAT was performed using a Noxturnal T3 device which recorded snore, sound, movement activity, body position, nasal pressure, oronasal thermal airflow, pulse, oximetry and both chest and abdominal respiratory effort. HSAT data was restricted to the time patient states they were in bed.     HSAT was scored using 1B 4% hypopnea rule.     HST AHI (Non-PAT): 7.7  Snoring was reported as mild and moderate.  Time with SpO2 below 89% was 2 minutes.   Overall signal quality was good     Pt will follow up with sleep provider to determine appropriate therapy.       HST POST-STUDY QUESTIONNAIRE    1. What time did you go to bed?  9:50 p.m.  2. How long do you think it took to fall asleep?  3 min  3. What time did you wake up to start the day?  5:53 a.m.  4. Did you get up during the night at all?  No  5. If you woke up, do you remember approximately what time(s)? No  6. Did you have any difficulty with the equipment?  No  7. Did you us any type of treatment with this study?  None  8. Was the head of the bed elevated? No  9. Did you sleep in a recliner?  No  10. Did you stop using CPAP at least 3 days before this test?  NA  11. Any other information you'd like us to know? I believe that I slept well last night.  I do not remember waking up if I did.  The hours that I slept last night are the hours that I normally sleep.  I generally have had no problem sleeping the last few months.

## 2022-06-24 NOTE — PROCEDURES
"HOME SLEEP STUDY INTERPRETATION        Patient: Rashawn Short  MRN: [id number]  YOB: 1960  Study Date: 6/22/2022  PCP/Referring Provider: Ludwig Carrillo;   Ordering Provider: Osvaldo Morocho MD, MD     Indications for Home Study: Rashawn Short is a 62 year old male who presents with symptoms suggestive of obstructive sleep apnea.    Estimated body mass index is 32 kg/m  as calculated from the following:    Height as of 3/16/22: 1.778 m (5' 10\").    Weight as of 3/16/22: 101.2 kg (223 lb).  Total score - [place]: 0 (3/13/2022  8:05 PM)  STOP-BANG: 3/8    Data: A full night home sleep study was performed recording the standard physiologic parameters including body position, movement, sound, nasal pressure, thermal oral airflow, chest and abdominal movements with respiratory inductance plethysmography, and oxygen saturation by pulse oximetry. Pulse rate was estimated by oximetry recording. This study was considered adequate based on > 4 hours of quality oximetry and respiratory recording. As specified by the AASM Manual for the Scoring of Sleep and Associated events, version 2.3, Rule VIII.D 1B, 4% oxygen desaturation scoring for hypopneas is used as a standard of care on all home sleep apnea testing.    Analysis Time:  474 minutes    Respiration:   Sleep Associated Hypoxemia: sustained hypoxemia was not present. Baseline oxygen saturation was 95%.  Time with saturation less than or equal to 88% was 2 minutes. The lowest oxygen saturation was 85%.   Snoring: Snoring was present.  Respiratory events: The home study revealed a presence of 8 obstructive apneas and 0 mixed and central apneas. There were 53 hypopneas resulting in a combined apnea/hypopnea index [AHI] of 7.7 events per hour.  AHI was 39.8 per hour supine, - per hour prone, 4.9 per hour on left side, and 5.1 per hour on right side.   Pattern: Excluding events noted above, respiratory rate and pattern was Normal.    Position: Percent of time " spent: supine - 7.9%, prone - 0%, on left - 54.5%, on right - 37.5%.      Heart Rate: By pulse oximetry normal rate was noted.     Assessment:     Mild obstructive sleep apnea.    Sleep associated hypoxemia was not present.    Recommendations:    Depending on clinical indications for treatment of mild obstructive sleep apnea, therapy options can include the following.     If there is excessive daytime sleepiness or other qualifying medical comorbidity, consider auto-CPAP at 5-15 cmH2O.    Alternatively, patient can consider oral appliance therapy through referral to dental sleep medicine.     Suggest optimizing sleep hygiene and avoiding sleep deprivation.    Weight management.    Diagnosis Code(s): Obstructive Sleep Apnea G47.33    Osvaldo Morocho MD, June 24, 2022   Diplomate, American Board of Psychiatry and Neurology, Sleep Medicine

## 2022-06-29 ASSESSMENT — SLEEP AND FATIGUE QUESTIONNAIRES
HOW LIKELY ARE YOU TO NOD OFF OR FALL ASLEEP WHILE LYING DOWN TO REST IN THE AFTERNOON WHEN CIRCUMSTANCES PERMIT: SLIGHT CHANCE OF DOZING
HOW LIKELY ARE YOU TO NOD OFF OR FALL ASLEEP WHILE SITTING INACTIVE IN A PUBLIC PLACE: WOULD NEVER DOZE
HOW LIKELY ARE YOU TO NOD OFF OR FALL ASLEEP WHILE SITTING QUIETLY AFTER LUNCH WITHOUT ALCOHOL: WOULD NEVER DOZE
HOW LIKELY ARE YOU TO NOD OFF OR FALL ASLEEP WHILE WATCHING TV: SLIGHT CHANCE OF DOZING
HOW LIKELY ARE YOU TO NOD OFF OR FALL ASLEEP WHILE SITTING AND TALKING TO SOMEONE: WOULD NEVER DOZE
HOW LIKELY ARE YOU TO NOD OFF OR FALL ASLEEP WHILE SITTING AND READING: SLIGHT CHANCE OF DOZING
HOW LIKELY ARE YOU TO NOD OFF OR FALL ASLEEP IN A CAR, WHILE STOPPED FOR A FEW MINUTES IN TRAFFIC: WOULD NEVER DOZE
HOW LIKELY ARE YOU TO NOD OFF OR FALL ASLEEP WHEN YOU ARE A PASSENGER IN A CAR FOR AN HOUR WITHOUT A BREAK: WOULD NEVER DOZE

## 2022-07-06 ENCOUNTER — OFFICE VISIT (OUTPATIENT)
Dept: SLEEP MEDICINE | Facility: CLINIC | Age: 62
End: 2022-07-06
Payer: COMMERCIAL

## 2022-07-06 VITALS
BODY MASS INDEX: 31.78 KG/M2 | RESPIRATION RATE: 16 BRPM | WEIGHT: 222 LBS | DIASTOLIC BLOOD PRESSURE: 91 MMHG | HEIGHT: 70 IN | OXYGEN SATURATION: 98 % | SYSTOLIC BLOOD PRESSURE: 126 MMHG | HEART RATE: 84 BPM

## 2022-07-06 DIAGNOSIS — G47.33 OSA (OBSTRUCTIVE SLEEP APNEA): Primary | ICD-10-CM

## 2022-07-06 PROCEDURE — 99214 OFFICE O/P EST MOD 30 MIN: CPT | Performed by: INTERNAL MEDICINE

## 2022-07-06 NOTE — NURSING NOTE
"Chief Complaint   Patient presents with     Study Results       Initial BP (!) 126/91   Pulse 84   Resp 16   Ht 1.778 m (5' 10\")   Wt 100.7 kg (222 lb)   SpO2 98%   BMI 31.85 kg/m   Estimated body mass index is 31.85 kg/m  as calculated from the following:    Height as of this encounter: 1.778 m (5' 10\").    Weight as of this encounter: 100.7 kg (222 lb).    Medication Reconciliation: complete  ESS: 3  Neck circumference: 42 centimeters.  DME: N/A  Noemí Marc, NEDRA      "

## 2022-07-06 NOTE — PROGRESS NOTES
Sleep Study Follow-Up Visit:    Date on this visit: 7/6/2022    Rashawn Short comes in today for follow-up of his home sleep study done on 6/22/22 at the [facility] for possible sleep apnea.     Analysis Time:  474 minutes     Respiration:   Sleep Associated Hypoxemia: sustained hypoxemia was not present. Baseline oxygen saturation was 95%.  Time with saturation less than or equal to 88% was 2 minutes. The lowest oxygen saturation was 85%.   Snoring: Snoring was present.  Respiratory events: The home study revealed a presence of 8 obstructive apneas and 0 mixed and central apneas. There were 53 hypopneas resulting in a combined apnea/hypopnea index [AHI] of 7.7 events per hour.  AHI was 39.8 per hour supine, - per hour prone, 4.9 per hour on left side, and 5.1 per hour on right side.   Pattern: Excluding events noted above, respiratory rate and pattern was Normal.     Position: Percent of time spent: supine - 7.9%, prone - 0%, on left - 54.5%, on right - 37.5%.        Heart Rate: By pulse oximetry normal rate was noted.      Assessment:     Mild obstructive sleep apnea.    Sleep associated hypoxemia was not present.    These findings were reviewed with patient.     Past medical/surgical history, family history, social history, medications and allergies were reviewed.      Problem List:  Patient Active Problem List    Diagnosis Date Noted     Atrial fibrillation with RVR (H) 12/28/2020     Priority: Medium     Chemical dependency (H) 02/15/2017     Priority: Medium     Alcohol dependence (H) 01/02/2017     Priority: Medium     Hypertension goal BP (blood pressure) < 140/90 05/07/2013     Priority: Medium     EtOH dependence (H) 04/24/2013     Priority: Medium     Paroxysmal atrial fibrillation (H) 01/29/2010     Priority: Medium        Impression/Plan:    1. Mild obstructive sleep apnea     Home sleep test result was reviewed in detail.  We discussed finding of mild obstructive sleep apnea,  exacerbation of sleep apnea in the supine position, consequences of untreated disease and management options.  Oral appliance therapy, CPAP, upper airway surgical intervention and conservative management was reviewed in detail.  Patient does not have any subjective complaints regarding sleep quality or daytime functioning.  We discussed cardiovascular consequences of untreated sleep apnea and placed this in context of his mild disease.  Patient declines any active interventions for his sleep apnea and we will concentrate on weight management and positional restriction to avoid supine sleep.  He was advised to follow-up if the clinical situation changes.    I spent a total of 30 minutes for this appointment on this date of service which include time spent before, during and after the visit for chart review, patient care, counseling and coordination of care.    Dr. Osvaldo Morocho     CC: Ludwig Carrillo

## 2022-07-06 NOTE — PATIENT INSTRUCTIONS
Examples of positional devices for avoiding sleeping on your back include the following. If interested, you can get these directly from these companies.     EcoScraps, https://www.[employer]  SlShuamebump, https://www.[facility].com  SleepNoodle, https://www.[employer]ap[employer].com/sleep-noodle

## 2022-09-27 ENCOUNTER — OFFICE VISIT (OUTPATIENT)
Dept: CARDIOLOGY | Facility: CLINIC | Age: 62
End: 2022-09-27
Payer: COMMERCIAL

## 2022-09-27 VITALS
BODY MASS INDEX: 31.2 KG/M2 | SYSTOLIC BLOOD PRESSURE: 128 MMHG | HEART RATE: 78 BPM | HEIGHT: 70 IN | WEIGHT: 217.9 LBS | OXYGEN SATURATION: 99 % | DIASTOLIC BLOOD PRESSURE: 86 MMHG

## 2022-09-27 DIAGNOSIS — I48.0 PAROXYSMAL ATRIAL FIBRILLATION (H): ICD-10-CM

## 2022-09-27 DIAGNOSIS — E78.5 HYPERLIPIDEMIA LDL GOAL <130: Primary | ICD-10-CM

## 2022-09-27 PROCEDURE — 99214 OFFICE O/P EST MOD 30 MIN: CPT | Performed by: INTERNAL MEDICINE

## 2022-09-27 RX ORDER — ROSUVASTATIN CALCIUM 20 MG/1
20 TABLET, COATED ORAL DAILY
Qty: 90 TABLET | Refills: 3 | Status: SHIPPED | OUTPATIENT
Start: 2022-09-27 | End: 2023-09-29

## 2022-09-27 NOTE — LETTER
9/27/2022    Ludwig Carrillo MD  [address]  [facility] [address]    RE: Rashawn Short       Dear Colleague,     I had the pleasure of seeing Rashawn Short in the [facility].  HPI and Plan:   Today I had the pleasure of seeing Rashawn Short at [facility]. He is a pleasant 62 year old patient with a past medical history of paroxysmal atrial fibrillation, hypertension, prior alcoholism [sober 3 years] and depression who presents to the clinic for a follow-up visit.    I had previously seen the patient for new onset atrial fibrillation which occurred in the setting of alcohol abuse.  We performed a FREDI guided cardioversion and kept him on amiodarone and anticoagulation for required period of time.  During the last clinic appointment we decided collectively to stop amiodarone and anticoagulation and start aspirin.  We also repeated a rhythm monitor showed no recurrence of atrial fibrillation.  Prior transthoracic echocardiogram had shown normal ejection fraction of 55 to 60%, no significant valvular disease and mildly enlarged left atrium.  Other testing in the past has included a Lexiscan in 12/2021 which showed mild anterior ischemia.  To follow this up recommendation was to perform a CT coronary angiogram.  However, on review of the results seems like he only got CT calcium scan and showed total coronary calcium score of 24 all of which was in LAD territory.  He denies chest pain    Today, the patient tells me that he has been feeling extremely well.  He is extremely kind and thanks me repeatedly for taking care of him.  He has been sober for 3 years.  He also reports trying to eat healthy and continues to lose weight.  He swims every day at [employer] without any issues.  I congratulated him on his efforts.    Assessment and plan:   1.  Paroxysmal atrial fibrillation-chads 2 vascular score of 1 for hypertension  2.  Essential  hypertension-controlled  3.  Alcoholism-sober for last 3 years  4.  Obesity-undertaking lifestyle modification  5.  Mild sleep apnea  6.  Abnormal Lexiscan followed by near normal CT calcium scan    The patient had 1 episode of atrial fibrillation which occurred in the setting of alcohol abuse.  He has not had a drink for the last many years.  He has not had any recurrence of atrial fibrillation on recently performed rhythm monitor.  I will continue him on baby aspirin.  His blood pressure was on the higher side in the past and has improved after initiation of Norvasc.  I do not believe we need to uptitrate it today.  He recently underwent a sleep study which showed mild sleep apnea.     As far as hypertension is concerned his blood pressure is very well controlled now.  He he is on multiple blood pressure medications and I suggested discontinuing one of the medications and titrating the dose of others but since everything is stable he is reluctant to do so.  We will continue the medications the way they are.    As far as abnormal Lexiscan is concerned.  I requested a CT coronary angiogram but it seems like he only got a CT calcium scan.  This showed minimal calcification in the LAD territory.  Since he is asymptomatic I will monitor him for now.  His risk factors include hyperlipidemia and hypertension.  Since his LDL cholesterol is high I will increase the dose of Crestor and recheck lipid profile in 4 months.  In the future if need be we can perform a CT coronary angiogram.  I will have him come back and see me in 1 year or sooner if needed.    Thank you for allowing me to participate in the care of Rashawn Short    This note was completed in part using Dragon voice recognition software. Although reviewed after completion, some word and grammatical errors may occur.    Austin Hercules MD  Cardiology    Orders Placed This Encounter   Procedures     Lipid Profile     ALT       Orders Placed This Encounter    Medications     rosuvastatin (CRESTOR) 20 MG tablet     Sig: Take 1 tablet (20 mg) by mouth daily     Dispense:  90 tablet     Refill:  3       Medications Discontinued During This Encounter   Medication Reason     rosuvastatin (CRESTOR) 10 MG tablet Reorder       Encounter Diagnoses   Name Primary?     Paroxysmal atrial fibrillation (H)      Hyperlipidemia LDL goal <130 Yes       CURRENT MEDICATIONS:  Current Outpatient Medications   Medication Sig Dispense Refill     amLODIPine (NORVASC) 2.5 MG tablet Take 1 tablet (2.5 mg) by mouth daily 90 tablet 1     aspirin 81 MG EC tablet Take 81 mg by mouth daily       cloNIDine (CATAPRES) 0.1 MG tablet Take 0.1 mg by mouth 2 times daily       fish oil-omega-3 fatty acids 1000 MG capsule Take 1 g by mouth daily       hydrochlorothiazide (HYDRODIURIL) 25 MG tablet Take 25 mg by mouth daily       lisinopril (ZESTRIL) 10 MG tablet Take 1 tablet (10 mg) by mouth daily 90 tablet 3     metoprolol succinate ER (TOPROL-XL) 100 MG 24 hr tablet Take 1 tablet (100 mg) by mouth daily 90 tablet 3     multivitamin, therapeutic (THERA-VIT) TABS tablet Take 1 tablet by mouth daily       rosuvastatin (CRESTOR) 20 MG tablet Take 1 tablet (20 mg) by mouth daily 90 tablet 3     thiamine (B-1) 100 MG tablet Take 100 mg by mouth daily       DIPHENHYDRAMINE-APAP, SLEEP, PO  (Patient not taking: Reported on 9/27/2022)         ALLERGIES   No Known Allergies    PAST MEDICAL HISTORY:  Past Medical History:   Diagnosis Date     Atrial fibrillation with RVR (H) 12/28/2020     Depressive disorder      EtOH dependence (H)      Hypertension      Paroxysmal atrial fibrillation (H) 01/29/2010       PAST SURGICAL HISTORY:  Past Surgical History:   Procedure Laterality Date     ANESTHESIA CARDIOVERSION N/A 1/11/2021    Procedure: ANESTHESIA, FOR CARDIOVERSION;  Surgeon: GENERIC ANESTHESIA PROVIDER;  Location:  OR     ORTHOPEDIC SURGERY  1998    ACL Right knee reconstruction-        FAMILY HISTORY:  Family  "History   Problem Relation Age of Onset     Arrhythmia Father         a-fib     Colon Polyps Sister      C.A.D. No family hx of      Diabetes No family hx of      Hypertension No family hx of      Cerebrovascular Disease No family hx of      Breast Cancer No family hx of      Cancer - colorectal No family hx of      Prostate Cancer No family hx of        SOCIAL HISTORY:  Social History     Socioeconomic History     Marital status:      Spouse name: None     Number of children: None     Years of education: None     Highest education level: None   Tobacco Use     Smoking status: Never Smoker     Smokeless tobacco: Never Used   Substance and Sexual Activity     Alcohol use: Not Currently     Comment: no alcolhol 2017     Drug use: No     Sexual activity: Yes     Partners: Female       Review of Systems:  Skin:  Negative bruising     Eyes:  Negative   reading glasses  ENT:  Negative      Respiratory:  Negative shortness of breath;sleep apnea     Cardiovascular:  Negative;palpitations;chest pain;fatigue;lightheadedness;dizziness;edema      Gastroenterology: not assessed      Genitourinary:  not assessed      Musculoskeletal:  not assessed      Neurologic:  Negative headaches;migraine headaches    Psychiatric:  Negative sleep disturbances    Heme/Lymph/Imm:  Negative allergies    Endocrine:  Negative diabetes;thyroid disorder      Physical Exam:  Vitals: /86   Pulse 78   Ht 1.778 m (5' 10\")   Wt 98.8 kg (217 lb 14.4 oz)   SpO2 99%   BMI 31.27 kg/m    Eyes: No icterus.  Pulmonary: Chest symmetric, lungs clear bilaterally and no crackles, wheezes or rales.  Cardiovascular: RRR with normal S1 and S2, no murmur, JVP normal.  Musculoskeletal: Edema of the lower extremities: None.  Neurologic: Oriented and appropriate without obvious focal deficits.   Psychiatric: Normal affect.     Recent Lab Results:  LIPID RESULTS:  Lab Results   Component Value Date    CHOL 208 (H) 03/02/2005    HDL 61 03/02/2005    LDL " 124 03/02/2005    TRIG 158 (H) 09/13/2021    TRIG 112 03/02/2005    CHOLHDLRATIO 3.4 03/02/2005       LIVER ENZYME RESULTS:  Lab Results   Component Value Date    AST 50 (H) 12/28/2020    ALT 98 (H) 12/28/2020       CBC RESULTS:  Lab Results   Component Value Date    WBC 8.2 12/29/2020    RBC 5.60 12/29/2020    HGB 16.5 12/29/2020    HCT 49.8 12/29/2020    MCV 89 12/29/2020    MCH 29.5 12/29/2020    MCHC 33.1 12/29/2020    RDW 13.7 12/29/2020     12/29/2020       BMP RESULTS:  Lab Results   Component Value Date     12/20/2021     12/29/2020    POTASSIUM 4.0 12/20/2021    POTASSIUM 3.7 01/11/2021    CHLORIDE 103 12/20/2021    CHLORIDE 100 09/13/2021    CHLORIDE 108 12/29/2020    CO2 29 12/20/2021    CO2 26 12/29/2020    ANIONGAP 4 12/20/2021    ANIONGAP 3 12/29/2020    GLC 81 12/20/2021    GLC 86 12/29/2020    BUN 16 12/20/2021    BUN 15 12/29/2020    CR 1.06 12/20/2021    CR 0.98 12/29/2020    GFRESTIMATED 75 12/20/2021    GFRESTIMATED 82 12/29/2020    GFRESTBLACK >90 12/29/2020    KELSEY 9.3 12/20/2021    KELSEY 8.6 12/29/2020        A1C RESULTS:  No results found for: A1C    INR RESULTS:  Lab Results   Component Value Date    INR 1.74 (H) 01/11/2021    INR 0.98 12/28/2020       CC  Austin Hercules MD  [address]  [place] [address]    All medical records were reviewed in detail and discussed with the patient. Greater than 30 mins were spent with the patient, 50% of this time was spent on counseling and coordination of care.  After visit summary was printed and given to the patient.    Thank you for allowing me to participate in the care of your patient.      Sincerely,     Austin Hercules MD     [facility]  cc:   Austin Hercules MD  [address]  [place] [address]

## 2022-09-27 NOTE — PROGRESS NOTES
HPI and Plan:   Today I had the pleasure of seeing Rashawn Short at [facility]. He is a pleasant 62 year old patient with a past medical history of paroxysmal atrial fibrillation, hypertension, prior alcoholism [sober 3 years] and depression who presents to the clinic for a follow-up visit.    I had previously seen the patient for new onset atrial fibrillation which occurred in the setting of alcohol abuse.  We performed a FREDI guided cardioversion and kept him on amiodarone and anticoagulation for required period of time.  During the last clinic appointment we decided collectively to stop amiodarone and anticoagulation and start aspirin.  We also repeated a rhythm monitor showed no recurrence of atrial fibrillation.  Prior transthoracic echocardiogram had shown normal ejection fraction of 55 to 60%, no significant valvular disease and mildly enlarged left atrium.  Other testing in the past has included a Lexiscan in 12/2021 which showed mild anterior ischemia.  To follow this up recommendation was to perform a CT coronary angiogram.  However, on review of the results seems like he only got CT calcium scan and showed total coronary calcium score of 24 all of which was in LAD territory.  He denies chest pain    Today, the patient tells me that he has been feeling extremely well.  He is extremely kind and thanks me repeatedly for taking care of him.  He has been sober for 3 years.  He also reports trying to eat healthy and continues to lose weight.  He swims every day at [employer] without any issues.  I congratulated him on his efforts.    Assessment and plan:   1.  Paroxysmal atrial fibrillation-chads 2 vascular score of 1 for hypertension  2.  Essential hypertension-controlled  3.  Alcoholism-sober for last 3 years  4.  Obesity-undertaking lifestyle modification  5.  Mild sleep apnea  6.  Abnormal Lexiscan followed by near normal CT calcium scan    The patient had 1 episode of atrial  fibrillation which occurred in the setting of alcohol abuse.  He has not had a drink for the last many years.  He has not had any recurrence of atrial fibrillation on recently performed rhythm monitor.  I will continue him on baby aspirin.  His blood pressure was on the higher side in the past and has improved after initiation of Norvasc.  I do not believe we need to uptitrate it today.  He recently underwent a sleep study which showed mild sleep apnea.     As far as hypertension is concerned his blood pressure is very well controlled now.  He he is on multiple blood pressure medications and I suggested discontinuing one of the medications and titrating the dose of others but since everything is stable he is reluctant to do so.  We will continue the medications the way they are.    As far as abnormal Lexiscan is concerned.  I requested a CT coronary angiogram but it seems like he only got a CT calcium scan.  This showed minimal calcification in the LAD territory.  Since he is asymptomatic I will monitor him for now.  His risk factors include hyperlipidemia and hypertension.  Since his LDL cholesterol is high I will increase the dose of Crestor and recheck lipid profile in 4 months.  In the future if need be we can perform a CT coronary angiogram.  I will have him come back and see me in 1 year or sooner if needed.    Thank you for allowing me to participate in the care of Rashawn Short    This note was completed in part using Dragon voice recognition software. Although reviewed after completion, some word and grammatical errors may occur.    Austin Hercules MD  Cardiology    Orders Placed This Encounter   Procedures     Lipid Profile     ALT       Orders Placed This Encounter   Medications     rosuvastatin (CRESTOR) 20 MG tablet     Sig: Take 1 tablet (20 mg) by mouth daily     Dispense:  90 tablet     Refill:  3       Medications Discontinued During This Encounter   Medication Reason     rosuvastatin (CRESTOR) 10 MG  tablet Reorder       Encounter Diagnoses   Name Primary?     Paroxysmal atrial fibrillation (H)      Hyperlipidemia LDL goal <130 Yes       CURRENT MEDICATIONS:  Current Outpatient Medications   Medication Sig Dispense Refill     amLODIPine (NORVASC) 2.5 MG tablet Take 1 tablet (2.5 mg) by mouth daily 90 tablet 1     aspirin 81 MG EC tablet Take 81 mg by mouth daily       cloNIDine (CATAPRES) 0.1 MG tablet Take 0.1 mg by mouth 2 times daily       fish oil-omega-3 fatty acids 1000 MG capsule Take 1 g by mouth daily       hydrochlorothiazide (HYDRODIURIL) 25 MG tablet Take 25 mg by mouth daily       lisinopril (ZESTRIL) 10 MG tablet Take 1 tablet (10 mg) by mouth daily 90 tablet 3     metoprolol succinate ER (TOPROL-XL) 100 MG 24 hr tablet Take 1 tablet (100 mg) by mouth daily 90 tablet 3     multivitamin, therapeutic (THERA-VIT) TABS tablet Take 1 tablet by mouth daily       rosuvastatin (CRESTOR) 20 MG tablet Take 1 tablet (20 mg) by mouth daily 90 tablet 3     thiamine (B-1) 100 MG tablet Take 100 mg by mouth daily       DIPHENHYDRAMINE-APAP, SLEEP, PO  (Patient not taking: Reported on 9/27/2022)         ALLERGIES   No Known Allergies    PAST MEDICAL HISTORY:  Past Medical History:   Diagnosis Date     Atrial fibrillation with RVR (H) 12/28/2020     Depressive disorder      EtOH dependence (H)      Hypertension      Paroxysmal atrial fibrillation (H) 01/29/2010       PAST SURGICAL HISTORY:  Past Surgical History:   Procedure Laterality Date     ANESTHESIA CARDIOVERSION N/A 1/11/2021    Procedure: ANESTHESIA, FOR CARDIOVERSION;  Surgeon: GENERIC ANESTHESIA PROVIDER;  Location:  OR     ORTHOPEDIC SURGERY  1998    ACL Right knee reconstruction-        FAMILY HISTORY:  Family History   Problem Relation Age of Onset     Arrhythmia Father         a-fib     Colon Polyps Sister      C.A.D. No family hx of      Diabetes No family hx of      Hypertension No family hx of      Cerebrovascular Disease No family hx of       "Breast Cancer No family hx of      Cancer - colorectal No family hx of      Prostate Cancer No family hx of        SOCIAL HISTORY:  Social History     Socioeconomic History     Marital status:      Spouse name: None     Number of children: None     Years of education: None     Highest education level: None   Tobacco Use     Smoking status: Never Smoker     Smokeless tobacco: Never Used   Substance and Sexual Activity     Alcohol use: Not Currently     Comment: no alcolhol 2017     Drug use: No     Sexual activity: Yes     Partners: Female       Review of Systems:  Skin:  Negative bruising     Eyes:  Negative   reading glasses  ENT:  Negative      Respiratory:  Negative shortness of breath;sleep apnea     Cardiovascular:  Negative;palpitations;chest pain;fatigue;lightheadedness;dizziness;edema      Gastroenterology: not assessed      Genitourinary:  not assessed      Musculoskeletal:  not assessed      Neurologic:  Negative headaches;migraine headaches    Psychiatric:  Negative sleep disturbances    Heme/Lymph/Imm:  Negative allergies    Endocrine:  Negative diabetes;thyroid disorder      Physical Exam:  Vitals: /86   Pulse 78   Ht 1.778 m (5' 10\")   Wt 98.8 kg (217 lb 14.4 oz)   SpO2 99%   BMI 31.27 kg/m    Eyes: No icterus.  Pulmonary: Chest symmetric, lungs clear bilaterally and no crackles, wheezes or rales.  Cardiovascular: RRR with normal S1 and S2, no murmur, JVP normal.  Musculoskeletal: Edema of the lower extremities: None.  Neurologic: Oriented and appropriate without obvious focal deficits.   Psychiatric: Normal affect.     Recent Lab Results:  LIPID RESULTS:  Lab Results   Component Value Date    CHOL 208 (H) 03/02/2005    HDL 61 03/02/2005     03/02/2005    TRIG 158 (H) 09/13/2021    TRIG 112 03/02/2005    CHOLHDLRATIO 3.4 03/02/2005       LIVER ENZYME RESULTS:  Lab Results   Component Value Date    AST 50 (H) 12/28/2020    ALT 98 (H) 12/28/2020       CBC RESULTS:  Lab Results "   Component Value Date    WBC 8.2 12/29/2020    RBC 5.60 12/29/2020    HGB 16.5 12/29/2020    HCT 49.8 12/29/2020    MCV 89 12/29/2020    MCH 29.5 12/29/2020    MCHC 33.1 12/29/2020    RDW 13.7 12/29/2020     12/29/2020       BMP RESULTS:  Lab Results   Component Value Date     12/20/2021     12/29/2020    POTASSIUM 4.0 12/20/2021    POTASSIUM 3.7 01/11/2021    CHLORIDE 103 12/20/2021    CHLORIDE 100 09/13/2021    CHLORIDE 108 12/29/2020    CO2 29 12/20/2021    CO2 26 12/29/2020    ANIONGAP 4 12/20/2021    ANIONGAP 3 12/29/2020    GLC 81 12/20/2021    GLC 86 12/29/2020    BUN 16 12/20/2021    BUN 15 12/29/2020    CR 1.06 12/20/2021    CR 0.98 12/29/2020    GFRESTIMATED 75 12/20/2021    GFRESTIMATED 82 12/29/2020    GFRESTBLACK >90 12/29/2020    KELSEY 9.3 12/20/2021    KELSEY 8.6 12/29/2020        A1C RESULTS:  No results found for: A1C    INR RESULTS:  Lab Results   Component Value Date    INR 1.74 (H) 01/11/2021    INR 0.98 12/28/2020       CC  Austin Hercules MD  [address]  [place] [address]    All medical records were reviewed in detail and discussed with the patient. Greater than 30 mins were spent with the patient, 50% of this time was spent on counseling and coordination of care.  After visit summary was printed and given to the patient.

## 2022-10-23 ENCOUNTER — HEALTH MAINTENANCE LETTER (OUTPATIENT)
Age: 62
End: 2022-10-23

## 2022-11-17 DIAGNOSIS — I48.0 PAROXYSMAL ATRIAL FIBRILLATION (H): ICD-10-CM

## 2022-11-17 DIAGNOSIS — I48.91 ATRIAL FIBRILLATION WITH RVR (H): ICD-10-CM

## 2022-11-17 RX ORDER — METOPROLOL SUCCINATE 100 MG/1
100 TABLET, EXTENDED RELEASE ORAL DAILY
Qty: 90 TABLET | Refills: 2 | Status: ON HOLD | OUTPATIENT
Start: 2022-11-17 | End: 2023-05-08

## 2022-11-17 RX ORDER — AMLODIPINE BESYLATE 2.5 MG/1
2.5 TABLET ORAL DAILY
Qty: 90 TABLET | Refills: 2 | Status: ON HOLD | OUTPATIENT
Start: 2022-11-17 | End: 2023-05-08

## 2023-01-27 ENCOUNTER — TELEPHONE (OUTPATIENT)
Dept: CARDIOLOGY | Facility: CLINIC | Age: 63
End: 2023-01-27
Payer: COMMERCIAL

## 2023-01-27 DIAGNOSIS — R94.39 ABNORMAL STRESS TEST: ICD-10-CM

## 2023-01-27 DIAGNOSIS — E78.5 HYPERLIPIDEMIA LDL GOAL <130: ICD-10-CM

## 2023-01-27 DIAGNOSIS — I48.91 ATRIAL FIBRILLATION WITH RVR (H): ICD-10-CM

## 2023-01-27 DIAGNOSIS — I10 HYPERTENSION GOAL BP (BLOOD PRESSURE) < 140/90: ICD-10-CM

## 2023-01-27 DIAGNOSIS — I48.0 PAROXYSMAL ATRIAL FIBRILLATION (H): Primary | ICD-10-CM

## 2023-01-27 NOTE — TELEPHONE ENCOUNTER
[facility] Call Center    Phone Message    May a detailed message be left on voicemail: yes     Reason for Call: Other: Patient called requesting to speak with a member of his care team. Patient received a letter stating he needs to have labs done. However, patient states he received this last month as well and was told he was not due until the Summer. Please call patient back to further clarify, thank you.    Action Taken: Message routed to:  Other: Cardiology    Travel Screening: Not Applicable     Thank you!  [facility]

## 2023-01-27 NOTE — TELEPHONE ENCOUNTER
Writer called and spoke with Rashawn.  He states that he was not aware that he needed to have labs in January, but is happy to make an appointment for this.  Writer reviewed OV note of Dr. Hercules 9\27\22, in which Dr. Hercules said to return in about 4 months to have lipid panel\ALT because of increased dose of Crestor.    Patient was in agreement.    Confirmed that lab orders are in place.  Also added return cardiology apmnt for September, because that order went missing.  Dr. Hercules's note clearly says that patient is to return in a year.    Rashawn will call the scheduling line to get the lab only appointment scheduled.    Magalie Howell RN on 1/27/2023 at 11:40 AM

## 2023-02-03 ENCOUNTER — LAB (OUTPATIENT)
Dept: LAB | Facility: CLINIC | Age: 63
End: 2023-02-03
Payer: COMMERCIAL

## 2023-02-03 DIAGNOSIS — I48.0 PAROXYSMAL ATRIAL FIBRILLATION (H): ICD-10-CM

## 2023-02-03 LAB
ALT SERPL W P-5'-P-CCNC: 21 U/L (ref 10–50)
CHOLEST SERPL-MCNC: 132 MG/DL
HDLC SERPL-MCNC: 44 MG/DL
LDLC SERPL CALC-MCNC: 66 MG/DL
NONHDLC SERPL-MCNC: 88 MG/DL
TRIGL SERPL-MCNC: 111 MG/DL

## 2023-02-03 PROCEDURE — 80061 LIPID PANEL: CPT

## 2023-02-03 PROCEDURE — 84460 ALANINE AMINO (ALT) (SGPT): CPT

## 2023-02-03 PROCEDURE — 36415 COLL VENOUS BLD VENIPUNCTURE: CPT

## 2023-04-02 ENCOUNTER — HEALTH MAINTENANCE LETTER (OUTPATIENT)
Age: 63
End: 2023-04-02

## 2023-05-06 ENCOUNTER — HOSPITAL ENCOUNTER (INPATIENT)
Facility: CLINIC | Age: 63
LOS: 2 days | Discharge: HOME | End: 2023-05-08
Attending: EMERGENCY MEDICINE | Admitting: INTERNAL MEDICINE
Payer: COMMERCIAL

## 2023-05-06 DIAGNOSIS — I48.0 PAROXYSMAL ATRIAL FIBRILLATION (H): Primary | ICD-10-CM

## 2023-05-06 DIAGNOSIS — I48.91 ATRIAL FIBRILLATION WITH RVR (H): ICD-10-CM

## 2023-05-06 LAB
ANION GAP SERPL CALCULATED.3IONS-SCNC: 15 MMOL/L (ref 7–15)
APTT PPP: 22 SECONDS (ref 22–38)
ATRIAL RATE - MUSE: NORMAL BPM
BASOPHILS # BLD AUTO: 0 10E3/UL (ref 0–0.2)
BASOPHILS NFR BLD AUTO: 0 %
BUN SERPL-MCNC: 22.7 MG/DL (ref 8–23)
CALCIUM SERPL-MCNC: 9.7 MG/DL (ref 8.8–10.2)
CHLORIDE SERPL-SCNC: 96 MMOL/L (ref 98–107)
CREAT SERPL-MCNC: 1.26 MG/DL (ref 0.67–1.17)
DEPRECATED HCO3 PLAS-SCNC: 27 MMOL/L (ref 22–29)
DIASTOLIC BLOOD PRESSURE - MUSE: NORMAL MMHG
EOSINOPHIL # BLD AUTO: 0 10E3/UL (ref 0–0.7)
EOSINOPHIL NFR BLD AUTO: 0 %
ERYTHROCYTE [DISTWIDTH] IN BLOOD BY AUTOMATED COUNT: 13.1 % (ref 10–15)
GFR SERPL CREATININE-BSD FRML MDRD: 64 ML/MIN/1.73M2
GLUCOSE SERPL-MCNC: 127 MG/DL (ref 70–99)
HCT VFR BLD AUTO: 56.5 % (ref 40–53)
HGB BLD-MCNC: 19 G/DL (ref 13.3–17.7)
HOLD SPECIMEN: NORMAL
IMM GRANULOCYTES # BLD: 0.1 10E3/UL
IMM GRANULOCYTES NFR BLD: 0 %
INR PPP: 1.08 (ref 0.85–1.15)
INTERPRETATION ECG - MUSE: NORMAL
LYMPHOCYTES # BLD AUTO: 0.6 10E3/UL (ref 0.8–5.3)
LYMPHOCYTES NFR BLD AUTO: 5 %
MCH RBC QN AUTO: 30 PG (ref 26.5–33)
MCHC RBC AUTO-ENTMCNC: 33.6 G/DL (ref 31.5–36.5)
MCV RBC AUTO: 89 FL (ref 78–100)
MONOCYTES # BLD AUTO: 0.6 10E3/UL (ref 0–1.3)
MONOCYTES NFR BLD AUTO: 5 %
NEUTROPHILS # BLD AUTO: 11.6 10E3/UL (ref 1.6–8.3)
NEUTROPHILS NFR BLD AUTO: 90 %
NRBC # BLD AUTO: 0 10E3/UL
NRBC BLD AUTO-RTO: 0 /100
P AXIS - MUSE: NORMAL DEGREES
PLATELET # BLD AUTO: 215 10E3/UL (ref 150–450)
POTASSIUM SERPL-SCNC: 3.6 MMOL/L (ref 3.4–5.3)
PR INTERVAL - MUSE: NORMAL MS
QRS DURATION - MUSE: 90 MS
QT - MUSE: 300 MS
QTC - MUSE: 446 MS
R AXIS - MUSE: -15 DEGREES
RBC # BLD AUTO: 6.34 10E6/UL (ref 4.4–5.9)
SODIUM SERPL-SCNC: 138 MMOL/L (ref 136–145)
SYSTOLIC BLOOD PRESSURE - MUSE: NORMAL MMHG
T AXIS - MUSE: 56 DEGREES
TSH SERPL DL<=0.005 MIU/L-ACNC: 2.04 UIU/ML (ref 0.3–4.2)
VENTRICULAR RATE- MUSE: 133 BPM
WBC # BLD AUTO: 13.1 10E3/UL (ref 4–11)

## 2023-05-06 PROCEDURE — 250N000011 HC RX IP 250 OP 636: Performed by: EMERGENCY MEDICINE

## 2023-05-06 PROCEDURE — 85025 COMPLETE CBC W/AUTO DIFF WBC: CPT | Performed by: EMERGENCY MEDICINE

## 2023-05-06 PROCEDURE — 96375 TX/PRO/DX INJ NEW DRUG ADDON: CPT

## 2023-05-06 PROCEDURE — 210N000001 HC R&B IMCU HEART CARE

## 2023-05-06 PROCEDURE — 85610 PROTHROMBIN TIME: CPT | Performed by: EMERGENCY MEDICINE

## 2023-05-06 PROCEDURE — 96374 THER/PROPH/DIAG INJ IV PUSH: CPT

## 2023-05-06 PROCEDURE — 80048 BASIC METABOLIC PNL TOTAL CA: CPT | Performed by: EMERGENCY MEDICINE

## 2023-05-06 PROCEDURE — 99285 EMERGENCY DEPT VISIT HI MDM: CPT | Mod: 25

## 2023-05-06 PROCEDURE — 258N000003 HC RX IP 258 OP 636: Performed by: INTERNAL MEDICINE

## 2023-05-06 PROCEDURE — 85730 THROMBOPLASTIN TIME PARTIAL: CPT | Performed by: EMERGENCY MEDICINE

## 2023-05-06 PROCEDURE — 36415 COLL VENOUS BLD VENIPUNCTURE: CPT | Performed by: EMERGENCY MEDICINE

## 2023-05-06 PROCEDURE — 99223 1ST HOSP IP/OBS HIGH 75: CPT | Mod: AI | Performed by: INTERNAL MEDICINE

## 2023-05-06 PROCEDURE — 84443 ASSAY THYROID STIM HORMONE: CPT | Performed by: INTERNAL MEDICINE

## 2023-05-06 PROCEDURE — 93005 ELECTROCARDIOGRAM TRACING: CPT

## 2023-05-06 PROCEDURE — 250N000011 HC RX IP 250 OP 636: Performed by: INTERNAL MEDICINE

## 2023-05-06 RX ORDER — NALOXONE HYDROCHLORIDE 0.4 MG/ML
0.2 INJECTION, SOLUTION INTRAMUSCULAR; INTRAVENOUS; SUBCUTANEOUS
Status: DISCONTINUED | OUTPATIENT
Start: 2023-05-06 | End: 2023-05-08 | Stop reason: HOSPADM

## 2023-05-06 RX ORDER — NALOXONE HYDROCHLORIDE 0.4 MG/ML
0.4 INJECTION, SOLUTION INTRAMUSCULAR; INTRAVENOUS; SUBCUTANEOUS
Status: DISCONTINUED | OUTPATIENT
Start: 2023-05-06 | End: 2023-05-08 | Stop reason: HOSPADM

## 2023-05-06 RX ORDER — AMOXICILLIN 250 MG
2 CAPSULE ORAL 2 TIMES DAILY PRN
Status: DISCONTINUED | OUTPATIENT
Start: 2023-05-06 | End: 2023-05-08 | Stop reason: HOSPADM

## 2023-05-06 RX ORDER — CLONIDINE HYDROCHLORIDE 0.1 MG/1
0.1 TABLET ORAL 2 TIMES DAILY
Status: DISCONTINUED | OUTPATIENT
Start: 2023-05-06 | End: 2023-05-08 | Stop reason: HOSPADM

## 2023-05-06 RX ORDER — OXYCODONE HYDROCHLORIDE 5 MG/1
5 TABLET ORAL EVERY 4 HOURS PRN
Status: DISCONTINUED | OUTPATIENT
Start: 2023-05-06 | End: 2023-05-08 | Stop reason: HOSPADM

## 2023-05-06 RX ORDER — PROCHLORPERAZINE 25 MG
25 SUPPOSITORY, RECTAL RECTAL EVERY 12 HOURS PRN
Status: DISCONTINUED | OUTPATIENT
Start: 2023-05-06 | End: 2023-05-08 | Stop reason: HOSPADM

## 2023-05-06 RX ORDER — LORAZEPAM 2 MG/ML
1 INJECTION INTRAMUSCULAR ONCE
Status: COMPLETED | OUTPATIENT
Start: 2023-05-06 | End: 2023-05-06

## 2023-05-06 RX ORDER — ONDANSETRON 2 MG/ML
4 INJECTION INTRAMUSCULAR; INTRAVENOUS EVERY 6 HOURS PRN
Status: DISCONTINUED | OUTPATIENT
Start: 2023-05-06 | End: 2023-05-08 | Stop reason: HOSPADM

## 2023-05-06 RX ORDER — ROSUVASTATIN CALCIUM 20 MG/1
20 TABLET, COATED ORAL DAILY
Status: DISCONTINUED | OUTPATIENT
Start: 2023-05-07 | End: 2023-05-08 | Stop reason: HOSPADM

## 2023-05-06 RX ORDER — ONDANSETRON 4 MG/1
4 TABLET, ORALLY DISINTEGRATING ORAL EVERY 6 HOURS PRN
Status: DISCONTINUED | OUTPATIENT
Start: 2023-05-06 | End: 2023-05-08 | Stop reason: HOSPADM

## 2023-05-06 RX ORDER — HEPARIN SODIUM 10000 [USP'U]/100ML
0-5000 INJECTION, SOLUTION INTRAVENOUS CONTINUOUS
Status: DISCONTINUED | OUTPATIENT
Start: 2023-05-06 | End: 2023-05-07

## 2023-05-06 RX ORDER — POLYETHYLENE GLYCOL 3350 17 G/17G
17 POWDER, FOR SOLUTION ORAL DAILY PRN
Status: DISCONTINUED | OUTPATIENT
Start: 2023-05-06 | End: 2023-05-08 | Stop reason: HOSPADM

## 2023-05-06 RX ORDER — LIDOCAINE 40 MG/G
CREAM TOPICAL
Status: DISCONTINUED | OUTPATIENT
Start: 2023-05-06 | End: 2023-05-08 | Stop reason: HOSPADM

## 2023-05-06 RX ORDER — HEPARIN SODIUM 10000 [USP'U]/100ML
0-5000 INJECTION, SOLUTION INTRAVENOUS CONTINUOUS
Status: DISCONTINUED | OUTPATIENT
Start: 2023-05-06 | End: 2023-05-06

## 2023-05-06 RX ORDER — ASPIRIN 81 MG/1
81 TABLET ORAL DAILY
Status: DISCONTINUED | OUTPATIENT
Start: 2023-05-07 | End: 2023-05-07

## 2023-05-06 RX ORDER — SODIUM CHLORIDE 9 MG/ML
INJECTION, SOLUTION INTRAVENOUS CONTINUOUS
Status: DISCONTINUED | OUTPATIENT
Start: 2023-05-06 | End: 2023-05-07

## 2023-05-06 RX ORDER — METOPROLOL SUCCINATE 100 MG/1
100 TABLET, EXTENDED RELEASE ORAL DAILY
Status: DISCONTINUED | OUTPATIENT
Start: 2023-05-07 | End: 2023-05-07

## 2023-05-06 RX ORDER — AMOXICILLIN 250 MG
1 CAPSULE ORAL 2 TIMES DAILY PRN
Status: DISCONTINUED | OUTPATIENT
Start: 2023-05-06 | End: 2023-05-08 | Stop reason: HOSPADM

## 2023-05-06 RX ORDER — ACETAMINOPHEN 650 MG/1
650 SUPPOSITORY RECTAL EVERY 6 HOURS PRN
Status: DISCONTINUED | OUTPATIENT
Start: 2023-05-06 | End: 2023-05-08 | Stop reason: HOSPADM

## 2023-05-06 RX ORDER — DILTIAZEM HYDROCHLORIDE 5 MG/ML
15 INJECTION INTRAVENOUS ONCE
Status: COMPLETED | OUTPATIENT
Start: 2023-05-06 | End: 2023-05-06

## 2023-05-06 RX ORDER — ACETAMINOPHEN 325 MG/1
650 TABLET ORAL EVERY 6 HOURS PRN
Status: DISCONTINUED | OUTPATIENT
Start: 2023-05-06 | End: 2023-05-08 | Stop reason: HOSPADM

## 2023-05-06 RX ORDER — PROCHLORPERAZINE MALEATE 5 MG
10 TABLET ORAL EVERY 6 HOURS PRN
Status: DISCONTINUED | OUTPATIENT
Start: 2023-05-06 | End: 2023-05-08 | Stop reason: HOSPADM

## 2023-05-06 RX ADMIN — HEPARIN SODIUM AND DEXTROSE 1200 UNITS/HR: 10000; 5 INJECTION INTRAVENOUS at 19:07

## 2023-05-06 RX ADMIN — DILTIAZEM HYDROCHLORIDE 15 MG: 5 INJECTION INTRAVENOUS at 17:02

## 2023-05-06 RX ADMIN — LORAZEPAM 1 MG: 2 INJECTION INTRAMUSCULAR; INTRAVENOUS at 16:28

## 2023-05-06 RX ADMIN — AMIODARONE HYDROCHLORIDE 150 MG: 1.5 INJECTION, SOLUTION INTRAVENOUS at 19:04

## 2023-05-06 RX ADMIN — SODIUM CHLORIDE 1 MG/MIN: 9 INJECTION, SOLUTION INTRAVENOUS at 19:08

## 2023-05-06 ASSESSMENT — ENCOUNTER SYMPTOMS
SHORTNESS OF BREATH: 0
NERVOUS/ANXIOUS: 1
PALPITATIONS: 1

## 2023-05-06 ASSESSMENT — ACTIVITIES OF DAILY LIVING (ADL)
ADLS_ACUITY_SCORE: 35
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
## 2023-05-06 NOTE — H&P
Admitted: 05/06/2023    HPI:  This is a 62-year-old male with history of hypertension, depression, history of alcohol abuse in the past, now been sober for more than 5 years, history of paroxysmal atrial fibrillation, status post DC cardioversion on 01/11/2021 and since then, has been in normal sinus rhythm.  He came to the ED with complaint of palpitation, lightheadedness, and tingling in his hands and fingers.    According to the patient, he was doing fine and doing well and was driving with his mother and, all of a sudden on the highway, he started having palpitation like something pounding in his heart and felt lightheaded, not well, and had tingling in his eyes.  He pulled over on the side and called his sister, who advised him to call 911.  He did call the 911 and when they arrived, his heart rate was in 150-170 and they brought him to the ED.    In the ED, he was found to be in AFib with RVR with heart rate ranging from 130-150. He was given a dose of Cardizem and now his heart rate is improved to 120s and he is feeling much better now.  The patient said that he has been feeling well now. He has no headache, dizziness, lightheadedness.  No chest pain, shortness of breath, orthopnea, PND, palpitation.  No fever, chills or cough.  No abdominal pain, back pain, dysuria, hematuria, constipation or diarrhea at this time.  He has been compliant with his medication.  He took his metoprolol, lisinopril, and clonidine this morning.  He is not on any anticoagulation.  that was discontinued by his cardiologist, Dr. Austin Hercules.  The of the review of system is negative at this time.    ASSESSMENT AND PLAN:    1.  Atrial fibrillation with RVR:  This is a 62-year-old male with history of AFib in the past requiring DC cardioversion in 01/2021 and has been in normal sinus rhythm since then.  He was on anticoagulation and amiodarone for a short period of time, which was discontinued, and the patient is currently only on  baby aspirin.  I think the patient did really well with cardioversion in the past. Now he is back in AFib with RVR.  He is not drinking.  No other symptoms other than his symptoms of palpitation.  At this time, he received a dose of IV Cardizem 15 mg.  His heart rate slowed down.  He is asymptomatic.  At this time, I will give him a bolus of amiodarone 150 mg x1.  I will start him on IV amiodarone infusion as per protocol in anticipation to do chemical cardioversion given weekend.  If he is unable to convert chemically, he will benefit from DC cardioverted again.  For this reason, I will keep him on IV heparin as well.  Consult cardiology to evaluate the patient.  At this point, IV heparin, IV amiodarone infusion.  Continue with the metoprolol (he already took his dose this morning), and we will see how he does.  In the meantime, we will check TSH, serial troponin, and we will do echocardiogram as well.    2.  Acute renal insufficiency:  The patient's creatinine is usually ranging at 1.06.  It is slightly on the higher side to 1.26 at this time. Most likely some dehydration and being on lisinopril.  We will keep him on IV fluids for now.  3.  Polycythemia and leukocytosis:  The patient does have polycythemia with hematocrit of 58.5 and hemoglobin of 19.0, most likely secondary to dehydration.  His hemoglobin was normal before; last one was checked in 12/2020 in our records.  At this point, I will keep him on IV fluids at 100 mL per hour, recheck his hemoglobin in the morning, and we will see how he does.  4.  Leukocytosis is most likely stress reaction to his AFib with RVR.  No obvious sign of infection at this time.  5.  Hypertension:  Blood pressure is stable on current medication.  He is on metoprolol 100 mg daily, amlodipine 2.5 mg daily, lisinopril 10 mg daily, and clonidine 0.1 mg b.i.d.  We will continue with that.  The patient has hydrochlorothiazide as well.  We will hold the hydrochlorothiazide for now  given acute renal failure and hold the lisinopril as well.  6.  Hyperlipidemia: On Crestor 20.  We will continue with that.   7.  Anxiety, depression: Has been stable. Currently not on any medication for that.  8.  DVT prophylaxis with IV heparin.    CODE STATUS:  Full code as per the patient wishes.    The case was discussed with the ED physician and the nursing staff taking care of the patient.    Nehemias Haskins MD        D: 2023   T: 2023   MT: md    Name:     LATRICIALATRICE  MRN:      [id number]        Account:     [id number]   :      1960           Admitted:    2023       Document: [id number]

## 2023-05-06 NOTE — H&P
[facility]    History and Physical  Hospitalist       Date of Admission:  5/6/2023    Assessment & Plan      This is a 62-year-old male with history of hypertension, depression, history of alcohol abuse in the past, now been sober for more than 5 years, history of paroxysmal atrial fibrillation, status post DC cardioversion on 01/11/2021 and since then, has been in normal sinus rhythm.  He came to the ED with complaint of palpitation, lightheadedness, and tingling in his hands and fingers.    ASSESSMENT AND PLAN:    1.  Atrial fibrillation with RVR:  This is a 62-year-old male with history of AFib in the past requiring DC cardioversion in 01/2021 and has been in normal sinus rhythm since then.  He was on anticoagulation and amiodarone for a short period of time, which was discontinued, and the patient is currently only on baby aspirin.  I think the patient did really well with cardioversion in the past. Now he is back in AFib with RVR.  He is not drinking.  No other symptoms other than his symptoms of palpitation.  At this time, he received a dose of IV Cardizem 15 mg.  His heart rate slowed down.  He is asymptomatic.  At this time, I will give him a bolus of amiodarone 150 mg x1.  I will start him on IV amiodarone infusion as per protocol in anticipation to do chemical cardioversion given weekend.  If he is unable to convert chemically, he will benefit from DC cardioverted again.  For this reason, I will keep him on IV heparin as well.  Consult cardiology to evaluate the patient.  At this point, IV heparin, IV amiodarone infusion.  Continue with the metoprolol (he already took his dose this morning), and we will see how he does.  In the meantime, we will check TSH, serial troponin, and we will do echocardiogram as well.    2.  Acute renal insufficiency:  The patient's creatinine is usually ranging at 1.06.  It is slightly on the higher side to 1.26 at this time. Most likely some  dehydration and being on lisinopril.  We will keep him on IV fluids for now.  3.  Polycythemia and leukocytosis:  The patient does have polycythemia with hematocrit of 58.5 and hemoglobin of 19.0, most likely secondary to dehydration.  His hemoglobin was normal before; last one was checked in 12/2020 in our records.  At this point, I will keep him on IV fluids at 100 mL per hour, recheck his hemoglobin in the morning, and we will see how he does.  4.  Leukocytosis is most likely stress reaction to his AFib with RVR.  No obvious sign of infection at this time.  5.  Hypertension:  Blood pressure is stable on current medication.  He is on metoprolol 100 mg daily, amlodipine 2.5 mg daily, lisinopril 10 mg daily, and clonidine 0.1 mg b.i.d.  We will continue with that.  The patient has hydrochlorothiazide as well.  We will hold the hydrochlorothiazide for now given acute renal failure and hold the lisinopril as well.  6.  Hyperlipidemia: On Crestor 20.  We will continue with that.   7.  Anxiety, depression: Has been stable. Currently not on any medication for that.  8.  DVT prophylaxis with IV heparin.    CODE STATUS:  Full code as per the patient wishes.    The case was discussed with the ED physician and the nursing staff taking care of the patient.    Nehemias Haskins MD        DVT Prophylaxis: Pneumatic Compression Devices/IV Heparin   Code Status: Full Code    Disposition: Expected discharge in 2 days once stable     Nehemias Haskins MD, MD    Primary Care Physician   Bárbara Young    Chief Complaint   Palpitation, lightheaded, tingling in fingers bilaterally       History is obtained from the patient    History of Present Illness   Admitted: 05/06/2023    HPI:  This is a 62-year-old male with history of hypertension, depression, history of alcohol abuse in the past, now been sober for more than 5 years, history of paroxysmal atrial fibrillation, status post DC cardioversion on 01/11/2021 and since then, has been in  normal sinus rhythm.  He came to the ED with complaint of palpitation, lightheadedness, and tingling in his hands and fingers.    According to the patient, he was doing fine and doing well and was driving with his mother and, all of a sudden on the highway, he started having palpitation like something pounding in his heart and felt lightheaded, not well, and had tingling in his eyes.  He pulled over on the side and called his sister, who advised him to call 911.  He did call the 911 and when they arrived, his heart rate was in 150-170 and they brought him to the ED.    In the ED, he was found to be in AFib with RVR with heart rate ranging from 130-150. He was given a dose of Cardizem and now his heart rate is improved to 120s and he is feeling much better now.  The patient said that he has been feeling well now. He has no headache, dizziness, lightheadedness.  No chest pain, shortness of breath, orthopnea, PND, palpitation.  No fever, chills or cough.  No abdominal pain, back pain, dysuria, hematuria, constipation or diarrhea at this time.  He has been compliant with his medication.  He took his metoprolol, lisinopril, and clonidine this morning.  He is not on any anticoagulation.  that was discontinued by his cardiologist, Dr. Austin Hercules.  The of the review of system is negative at this time.    Past Medical History    I have reviewed this patient's medical history and updated it with pertinent information if needed.   Past Medical History:   Diagnosis Date     Atrial fibrillation with RVR (H) 12/28/2020     Depressive disorder      EtOH dependence (H)      Hypertension      Paroxysmal atrial fibrillation (H) 01/29/2010       Past Surgical History   I have reviewed this patient's surgical history and updated it with pertinent information if needed.  Past Surgical History:   Procedure Laterality Date     ANESTHESIA CARDIOVERSION N/A 1/11/2021    Procedure: ANESTHESIA, FOR CARDIOVERSION;  Surgeon: GENERIC  ANESTHESIA PROVIDER;  Location:  OR     ORTHOPEDIC SURGERY  1998    ACL Right knee reconstruction-        Prior to Admission Medications   Prior to Admission Medications   Prescriptions Last Dose Informant Patient Reported? Taking?   DIPHENHYDRAMINE-APAP, SLEEP, PO   Yes No   Patient not taking: Reported on 9/27/2022   amLODIPine (NORVASC) 2.5 MG tablet   No No   Sig: Take 1 tablet (2.5 mg) by mouth daily   aspirin 81 MG EC tablet   Yes No   Sig: Take 81 mg by mouth daily   cloNIDine (CATAPRES) 0.1 MG tablet   Yes No   Sig: Take 0.1 mg by mouth 2 times daily   fish oil-omega-3 fatty acids 1000 MG capsule  Self Yes No   Sig: Take 1 g by mouth daily   hydrochlorothiazide (HYDRODIURIL) 25 MG tablet   Yes No   Sig: Take 25 mg by mouth daily   lisinopril (ZESTRIL) 10 MG tablet   No No   Sig: Take 1 tablet (10 mg) by mouth daily   metoprolol succinate ER (TOPROL XL) 100 MG 24 hr tablet   No No   Sig: Take 1 tablet (100 mg) by mouth daily   multivitamin, therapeutic (THERA-VIT) TABS tablet  Self Yes No   Sig: Take 1 tablet by mouth daily   rosuvastatin (CRESTOR) 20 MG tablet   No No   Sig: Take 1 tablet (20 mg) by mouth daily   thiamine (B-1) 100 MG tablet  Self Yes No   Sig: Take 100 mg by mouth daily      Facility-Administered Medications: None     Allergies   No Known Allergies    Social History   I have reviewed this patient's social history and updated it with pertinent information if needed. Rashawn Short  reports that he has never smoked. He has never used smokeless tobacco. He reports that he does not currently use alcohol. He reports that he does not use drugs.    Family History   I have reviewed this patient's family history and updated it with pertinent information if needed.   Family History   Problem Relation Age of Onset     Arrhythmia Father         a-fib     Colon Polyps Sister      C.A.D. No family hx of      Diabetes No family hx of      Hypertension No family hx of      Cerebrovascular Disease No  family hx of      Breast Cancer No family hx of      Cancer - colorectal No family hx of      Prostate Cancer No family hx of        Review of Systems   CONSTITUTIONAL:  positive for  fatigue  EYES:  negative  HEENT:  negative  RESPIRATORY:  negative  CARDIOVASCULAR:  positive for  palpitations, fatigue  GASTROINTESTINAL:  negative  GENITOURINARY:  negative  INTEGUMENT/BREAST:  negative  HEMATOLOGIC/LYMPHATIC:  negative  ALLERGIC/IMMUNOLOGIC:  negative  ENDOCRINE:  negative  MUSCULOSKELETAL:  negative  NEUROLOGICAL:  negative  BEHAVIOR/PSYCH:  positive for anxiety    Physical Exam   Temp: 98.3  F (36.8  C) Temp src: Oral BP: (!) 135/111 Pulse: (!) 137   Resp: (!) 36 SpO2: 94 %      Vital Signs with Ranges  Temp:  [98.3  F (36.8  C)] 98.3  F (36.8  C)  Pulse:  [110-152] 137  Resp:  [24-36] 36  BP: (128-135)/(111-117) 135/111  SpO2:  [82 %-98 %] 94 %  0 lbs 0 oz    Constitutional: Awake, alert, cooperative, no apparent distress.  Eyes: Conjunctiva and pupils examined and normal.  HEENT: Moist mucous membranes, normal dentition.  Respiratory: Clear to auscultation bilaterally, no crackles or wheezing.  Cardiovascular: Irregularly irregular rhythm, normal S1 and S2, and no murmur noted.  GI: Soft, non-distended, non-tender, normal bowel sounds.  Lymph/Hematologic: No anterior cervical or supraclavicular adenopathy.  Skin: No rashes, no cyanosis, no edema.  Musculoskeletal: No joint swelling, erythema or tenderness.  Neurologic: Cranial nerves 2-12 intact, normal strength and sensation.  Psychiatric: Alert, oriented to person, place and time, no obvious anxiety or depression.    Data   Data reviewed today:  I personally reviewed the EKG tracing showing Atrial fibrillation with rapid ventricular response with premature ventricular .  Recent Labs   Lab 05/06/23  1644   WBC 13.1*   HGB 19.0*   MCV 89         POTASSIUM 3.6   CHLORIDE 96*   CO2 27   BUN 22.7   CR 1.26*   ANIONGAP 15   KELSEY 9.7   *        No results found for this or any previous visit (from the past 24 hour(s)).

## 2023-05-06 NOTE — PHARMACY-ADMISSION MEDICATION HISTORY
Pharmacist Admission Medication History    Admission medication history is complete. The information provided in this note is only as accurate as the sources available at the time of the update.    Medication reconciliation/reorder completed by provider prior to medication history? Yes    Information Source(s): Patient via in-person    Pertinent Information: none    Changes made to PTA medication list:    Added: artificial tears    Deleted: Benadryl for sleep    Changed: clonidine from BID to QD    Medication Affordability:  Not including over the counter (OTC) medications, was there a time in the past 12 months when you did not take your medications as prescribed because of cost?: No    Allergies reviewed with patient and updates made in EHR: yes    Medication History Completed By: Rebeka Johns [facility] 5/6/2023 6:30 PM    Prior to Admission medications    Medication Sig Last Dose Taking? Auth Provider Long Term End Date   amLODIPine (NORVASC) 2.5 MG tablet Take 1 tablet (2.5 mg) by mouth daily 5/6/2023 Yes Austin Hercules MD Yes    aspirin 81 MG EC tablet Take 81 mg by mouth daily 5/6/2023 Yes Reported, Patient No    cloNIDine (CATAPRES) 0.1 MG tablet Take 0.1 mg by mouth every morning 5/6/2023 Yes Reported, Patient No    fish oil-omega-3 fatty acids 1000 MG capsule Take 1 g by mouth daily 5/6/2023 Yes Unknown, Entered By History No    hydrochlorothiazide (HYDRODIURIL) 25 MG tablet Take 25 mg by mouth daily 5/6/2023 Yes Reported, Patient No    hypromellose (ARTIFICIAL TEARS) 0.5 % SOLN ophthalmic solution Place 1 drop into both eyes every hour as needed for dry eyes prn Yes Unknown, Entered By History     lisinopril (ZESTRIL) 10 MG tablet Take 1 tablet (10 mg) by mouth daily 5/6/2023 Yes Bibi Roman, PIPE CNP Yes    metoprolol succinate ER (TOPROL XL) 100 MG 24 hr tablet Take 1 tablet (100 mg) by mouth daily 5/6/2023 Yes Austin Hercules MD Yes    multivitamin, therapeutic (THERA-VIT) TABS tablet Take 1 tablet  by mouth daily 5/6/2023 Yes Reported, Patient No    rosuvastatin (CRESTOR) 20 MG tablet Take 1 tablet (20 mg) by mouth daily 5/6/2023 Yes Austin Hercules MD Yes    thiamine (B-1) 100 MG tablet Take 100 mg by mouth daily 5/6/2023 Yes Unknown, Entered By History No

## 2023-05-06 NOTE — ED PROVIDER NOTES
History     Chief Complaint:  Palpitations and Chest Pain       HPI   Rashawn Short is a 62 year old male with a remote history of atrial fibrillation with rapid ventricular response (not current and no anticoagulation tx), hypertension, ETOH dependence (last alcohol drink was Dec 2017) who presents with palpitations, chest pain and hyperventalation. He had the onset of anxiety while driving and called 911. EMS found him in atrial fibrillation with HRs in the 150s to 170s. Upon evaluation he is feeling calm and the anxiety has resolved. No shortness of breath or CP.       Independent Historian:    EMS    Review of External Notes: none     ROS:  Review of Systems   Respiratory: Negative for shortness of breath.    Cardiovascular: Positive for chest pain and palpitations.   Psychiatric/Behavioral: The patient is nervous/anxious.    All other systems reviewed and are negative.      Allergies:  No Known Allergies     Medications:    Amlodipine   Asprin 81 mg   Clonidine   Hydrochlorothiazide   Lisinopril   Metoprolol succinate   Rosuvastatin   Thiamine     Past Medical History:    Atrial fibrillation with rapid ventricular response  Depression   ETOH dependence   Hypertension     Past Surgical History:    Anesthesia cardioversion  Right knee surgery      Family History:    Father: arrhythmia   Sister: colon polyps    Social History:  The patient presents to the ED by means of ambulance.   PCP: Ludwig Carrillo     Physical Exam     Patient Vitals for the past 24 hrs:   BP Temp Temp src Pulse Resp SpO2   05/06/23 1646 -- -- -- (!) 137 (!) 36 94 %   05/06/23 1633 (!) 135/111 -- -- (!) 147 30 (!) 82 %   05/06/23 1618 -- -- -- (!) 152 24 98 %   05/06/23 1601 -- -- -- (!) 149 24 98 %   05/06/23 1600 -- 98.3  F (36.8  C) Oral -- -- --   05/06/23 1556 (!) 128/117 -- -- 110 -- --        Physical Exam  General/Appearance: appears stated age, well-groomed, appears comfortable  Eyes: EOMI, no scleral injection, no  icterus  ENT: MMM  Neck: supple, nl ROM, no stiffness  Cardiovascular: irregularly irregular and tachy, nl S1S2, no m/r/g, 2+ pulses in all 4 extremities, cap refill <2sec  Respiratory: CTAB, good air movement throughout, no wheezes/rhonchi/rales, no increased WOB, no retractions  GI: abd soft, non-distended, nttp,  no HSM, no rebound, no guarding, nl BS  MSK: YEPEZ, good tone, no bony abnormality  Skin: warm and well-perfused, no rash, no edema, no ecchymosis, nl turgor  Neuro: GCS 15, alert and oriented, no gross focal neuro deficits  Psych: mildly anxious affect  Heme: no petechia, no purpura, no active bleeding        Emergency Department Course       ECG results from 05/06/23   EKG 12-lead, tracing only     Value    Systolic Blood Pressure     Diastolic Blood Pressure     Ventricular Rate 133    Atrial Rate     CO Interval     QRS Duration 90        QTc 446    P Axis     R AXIS -15    T Axis 56    Interpretation ECG      Atrial fibrillation with rapid ventricular response with premature ventricular or aberrantly conducted complexes  Inferior infarct , age undetermined  Cannot rule out Anterior infarct , age undetermined  ST & T wave abnormality, consider lateral ischemia  Abnormal ECG  When compared with ECG of 11-JAN-2021 10:59,  Significant changes have occurred  Confirmed by GENERATED REPORT, COMPUTER ([id number]),  NICOLE HELMS ([id number]) on 5/6/2023 4:03:14 PM         Laboratory:  Labs Ordered and Resulted from Time of ED Arrival to Time of ED Departure   BASIC METABOLIC PANEL - Abnormal       Result Value    Sodium 138      Potassium 3.6      Chloride 96 (*)     Carbon Dioxide (CO2) 27      Anion Gap 15      Urea Nitrogen 22.7      Creatinine 1.26 (*)     Calcium 9.7      Glucose 127 (*)     GFR Estimate 64     CBC WITH PLATELETS AND DIFFERENTIAL - Abnormal    WBC Count 13.1 (*)     RBC Count 6.34 (*)     Hemoglobin 19.0 (*)     Hematocrit 56.5 (*)     MCV 89      MCH 30.0      MCHC 33.6      RDW 13.1       Platelet Count 215      % Neutrophils 90      % Lymphocytes 5      % Monocytes 5      % Eosinophils 0      % Basophils 0      % Immature Granulocytes 0      NRBCs per 100 WBC 0      Absolute Neutrophils 11.6 (*)     Absolute Lymphocytes 0.6 (*)     Absolute Monocytes 0.6      Absolute Eosinophils 0.0      Absolute Basophils 0.0      Absolute Immature Granulocytes 0.1      Absolute NRBCs 0.0          Emergency Department Course & Assessments:  Interventions:  Medications   diltiazem (CARDIZEM) 125 mg in sodium chloride 0.9 % 125 mL infusion (has no administration in time range)   LORazepam (ATIVAN) injection 1 mg (1 mg Intravenous $Given 5/6/23 1628)   diltiazem (CARDIZEM) injection 15 mg (15 mg Intravenous $Given 5/6/23 1702)      Independent Interpretation (X-rays, CTs, rhythm strip):  None.      Consultations/Discussion of Management or Tests:  1741 I spoke with the hospitalist Dr. Haskins regarding admission.      Social Determinants of Health affecting care:  none    Assessments:  1551 I obtained the history and examined the patient as noted above.     Disposition:  The patient was admitted to the hospital under the care of Dr. Haskins.     Impression & Plan      Medical Decision Making:  The pt presents today with complaint of anxiety.  The patient is found to be in atrial fibrillation with RVR, which I feel explains their sxs. There are no signs of cardiac ischemia, no signs or symptoms of pulmonary embolism, no evidence of pneumonia, or other acute process.  Given the lack of HD instability and/or signs of end-organ ischemia (CP, AMS, abd pain 2/2 mesenteric ischemia) I did not feel that emergent cardioversion was necessary.  We rate controlled with dilt.  Given that the duration since onset is unknown, I did not feel that it is safe to cardiovert in the ED and so the patient will be admitted for ongoing rate control, evaluation, and management. The pt agrees with the plan.  The pt remains HD stable so I feel safe  for the [facility].      Diagnosis:    ICD-10-CM    1. Atrial fibrillation with RVR (H)  I48.91            Discharge Medications:  New Prescriptions    No medications on file          Scribe Disclosure:  I, Berto Kjer, am serving as a scribe at 5:41 PM on 5/6/2023 to document services personally performed by Veronica Molina MD based on my observations and the provider's statements to me.    5/6/2023   Veronica Molina MD Mahoney, Katherine Collins, MD  05/06/23 1744

## 2023-05-06 NOTE — ED NOTES
Bed: ED29  Expected date: 5/6/23  Expected time: 3:40 PM  Means of arrival: Ambulance  Comments:  419 62m rvr eta [id number]

## 2023-05-07 ENCOUNTER — APPOINTMENT (OUTPATIENT)
Dept: PHYSICAL THERAPY | Facility: CLINIC | Age: 63
End: 2023-05-07
Attending: INTERNAL MEDICINE
Payer: COMMERCIAL

## 2023-05-07 LAB
ALBUMIN SERPL BCG-MCNC: 3.6 G/DL (ref 3.5–5.2)
ANION GAP SERPL CALCULATED.3IONS-SCNC: 15 MMOL/L (ref 7–15)
BASOPHILS # BLD AUTO: 0 10E3/UL (ref 0–0.2)
BASOPHILS NFR BLD AUTO: 0 %
BUN SERPL-MCNC: 18 MG/DL (ref 8–23)
CALCIUM SERPL-MCNC: 8.2 MG/DL (ref 8.8–10.2)
CHLORIDE SERPL-SCNC: 99 MMOL/L (ref 98–107)
CREAT SERPL-MCNC: 1.1 MG/DL (ref 0.67–1.17)
DEPRECATED HCO3 PLAS-SCNC: 20 MMOL/L (ref 22–29)
EOSINOPHIL # BLD AUTO: 0 10E3/UL (ref 0–0.7)
EOSINOPHIL NFR BLD AUTO: 0 %
ERYTHROCYTE [DISTWIDTH] IN BLOOD BY AUTOMATED COUNT: 13 % (ref 10–15)
ERYTHROCYTE [DISTWIDTH] IN BLOOD BY AUTOMATED COUNT: 13.1 % (ref 10–15)
GFR SERPL CREATININE-BSD FRML MDRD: 75 ML/MIN/1.73M2
GLUCOSE SERPL-MCNC: 115 MG/DL (ref 70–99)
HCT VFR BLD AUTO: 45.1 % (ref 40–53)
HCT VFR BLD AUTO: 46.5 % (ref 40–53)
HGB BLD-MCNC: 15.6 G/DL (ref 13.3–17.7)
HGB BLD-MCNC: 16.2 G/DL (ref 13.3–17.7)
IMM GRANULOCYTES # BLD: 0.1 10E3/UL
IMM GRANULOCYTES NFR BLD: 1 %
LYMPHOCYTES # BLD AUTO: 0.9 10E3/UL (ref 0.8–5.3)
LYMPHOCYTES NFR BLD AUTO: 8 %
MAGNESIUM SERPL-MCNC: 1.7 MG/DL (ref 1.7–2.3)
MCH RBC QN AUTO: 30.3 PG (ref 26.5–33)
MCH RBC QN AUTO: 30.4 PG (ref 26.5–33)
MCHC RBC AUTO-ENTMCNC: 34.6 G/DL (ref 31.5–36.5)
MCHC RBC AUTO-ENTMCNC: 34.8 G/DL (ref 31.5–36.5)
MCV RBC AUTO: 87 FL (ref 78–100)
MCV RBC AUTO: 88 FL (ref 78–100)
MONOCYTES # BLD AUTO: 0.8 10E3/UL (ref 0–1.3)
MONOCYTES NFR BLD AUTO: 7 %
NEUTROPHILS # BLD AUTO: 9.1 10E3/UL (ref 1.6–8.3)
NEUTROPHILS NFR BLD AUTO: 84 %
NRBC # BLD AUTO: 0 10E3/UL
NRBC BLD AUTO-RTO: 0 /100
PHOSPHATE SERPL-MCNC: 2.3 MG/DL (ref 2.5–4.5)
PLATELET # BLD AUTO: 179 10E3/UL (ref 150–450)
PLATELET # BLD AUTO: 186 10E3/UL (ref 150–450)
POTASSIUM SERPL-SCNC: 3.4 MMOL/L (ref 3.4–5.3)
POTASSIUM SERPL-SCNC: 3.4 MMOL/L (ref 3.4–5.3)
POTASSIUM SERPL-SCNC: 3.7 MMOL/L (ref 3.4–5.3)
RBC # BLD AUTO: 5.14 10E6/UL (ref 4.4–5.9)
RBC # BLD AUTO: 5.35 10E6/UL (ref 4.4–5.9)
SODIUM SERPL-SCNC: 134 MMOL/L (ref 136–145)
TROPONIN T SERPL HS-MCNC: 8 NG/L
TROPONIN T SERPL HS-MCNC: 9 NG/L
UFH PPP CHRO-ACNC: 0.16 IU/ML
UFH PPP CHRO-ACNC: 0.54 IU/ML
WBC # BLD AUTO: 10.8 10E3/UL (ref 4–11)
WBC # BLD AUTO: 13 10E3/UL (ref 4–11)

## 2023-05-07 PROCEDURE — 84484 ASSAY OF TROPONIN QUANT: CPT | Performed by: INTERNAL MEDICINE

## 2023-05-07 PROCEDURE — 85520 HEPARIN ASSAY: CPT | Performed by: INTERNAL MEDICINE

## 2023-05-07 PROCEDURE — 93005 ELECTROCARDIOGRAM TRACING: CPT

## 2023-05-07 PROCEDURE — 93010 ELECTROCARDIOGRAM REPORT: CPT | Performed by: INTERNAL MEDICINE

## 2023-05-07 PROCEDURE — 99233 SBSQ HOSP IP/OBS HIGH 50: CPT | Performed by: HOSPITALIST

## 2023-05-07 PROCEDURE — 250N000011 HC RX IP 250 OP 636: Performed by: INTERNAL MEDICINE

## 2023-05-07 PROCEDURE — 80069 RENAL FUNCTION PANEL: CPT | Performed by: INTERNAL MEDICINE

## 2023-05-07 PROCEDURE — 97161 PT EVAL LOW COMPLEX 20 MIN: CPT | Mod: GP

## 2023-05-07 PROCEDURE — 250N000013 HC RX MED GY IP 250 OP 250 PS 637: Performed by: INTERNAL MEDICINE

## 2023-05-07 PROCEDURE — 97110 THERAPEUTIC EXERCISES: CPT | Mod: GP

## 2023-05-07 PROCEDURE — 85027 COMPLETE CBC AUTOMATED: CPT | Performed by: INTERNAL MEDICINE

## 2023-05-07 PROCEDURE — 97530 THERAPEUTIC ACTIVITIES: CPT | Mod: GP

## 2023-05-07 PROCEDURE — 258N000003 HC RX IP 258 OP 636: Performed by: INTERNAL MEDICINE

## 2023-05-07 PROCEDURE — 85025 COMPLETE CBC W/AUTO DIFF WBC: CPT | Performed by: INTERNAL MEDICINE

## 2023-05-07 PROCEDURE — 36415 COLL VENOUS BLD VENIPUNCTURE: CPT | Performed by: INTERNAL MEDICINE

## 2023-05-07 PROCEDURE — 99254 IP/OBS CNSLTJ NEW/EST MOD 60: CPT | Performed by: INTERNAL MEDICINE

## 2023-05-07 PROCEDURE — 84132 ASSAY OF SERUM POTASSIUM: CPT | Performed by: INTERNAL MEDICINE

## 2023-05-07 PROCEDURE — 83735 ASSAY OF MAGNESIUM: CPT | Performed by: INTERNAL MEDICINE

## 2023-05-07 PROCEDURE — 210N000001 HC R&B IMCU HEART CARE

## 2023-05-07 RX ORDER — METOPROLOL SUCCINATE 100 MG/1
100 TABLET, EXTENDED RELEASE ORAL 2 TIMES DAILY
Status: DISCONTINUED | OUTPATIENT
Start: 2023-05-07 | End: 2023-05-08 | Stop reason: HOSPADM

## 2023-05-07 RX ORDER — POTASSIUM CHLORIDE 1500 MG/1
40 TABLET, EXTENDED RELEASE ORAL ONCE
Status: COMPLETED | OUTPATIENT
Start: 2023-05-07 | End: 2023-05-07

## 2023-05-07 RX ADMIN — SODIUM CHLORIDE: 9 INJECTION, SOLUTION INTRAVENOUS at 00:30

## 2023-05-07 RX ADMIN — CLONIDINE HYDROCHLORIDE 0.1 MG: 0.1 TABLET ORAL at 21:42

## 2023-05-07 RX ADMIN — METOPROLOL SUCCINATE 100 MG: 100 TABLET, EXTENDED RELEASE ORAL at 21:42

## 2023-05-07 RX ADMIN — RIVAROXABAN 20 MG: 10 TABLET, FILM COATED ORAL at 17:02

## 2023-05-07 RX ADMIN — ASPIRIN 81 MG: 81 TABLET, COATED ORAL at 08:56

## 2023-05-07 RX ADMIN — CLONIDINE HYDROCHLORIDE 0.1 MG: 0.1 TABLET ORAL at 00:31

## 2023-05-07 RX ADMIN — HEPARIN SODIUM AND DEXTROSE 1150 UNITS/HR: 10000; 5 INJECTION INTRAVENOUS at 11:56

## 2023-05-07 RX ADMIN — SODIUM CHLORIDE: 9 INJECTION, SOLUTION INTRAVENOUS at 10:31

## 2023-05-07 RX ADMIN — ROSUVASTATIN CALCIUM 20 MG: 20 TABLET, FILM COATED ORAL at 08:56

## 2023-05-07 RX ADMIN — CLONIDINE HYDROCHLORIDE 0.1 MG: 0.1 TABLET ORAL at 08:56

## 2023-05-07 RX ADMIN — POTASSIUM CHLORIDE 40 MEQ: 1500 TABLET, EXTENDED RELEASE ORAL at 08:56

## 2023-05-07 RX ADMIN — METOPROLOL SUCCINATE 100 MG: 100 TABLET, EXTENDED RELEASE ORAL at 08:56

## 2023-05-07 ASSESSMENT — ACTIVITIES OF DAILY LIVING (ADL)
ADLS_ACUITY_SCORE: 20
WEAR_GLASSES_OR_BLIND: YES
ADLS_ACUITY_SCORE: 20
WALKING_OR_CLIMBING_STAIRS_DIFFICULTY: NO
DIFFICULTY_EATING/SWALLOWING: NO
FALL_HISTORY_WITHIN_LAST_SIX_MONTHS: NO
ADLS_ACUITY_SCORE: 20
ADLS_ACUITY_SCORE: 20
TOILETING_ISSUES: NO
ADLS_ACUITY_SCORE: 20
ADLS_ACUITY_SCORE: 20
CHANGE_IN_FUNCTIONAL_STATUS_SINCE_ONSET_OF_CURRENT_ILLNESS/INJURY: NO
ADLS_ACUITY_SCORE: 20
DOING_ERRANDS_INDEPENDENTLY_DIFFICULTY: NO
CONCENTRATING,_REMEMBERING_OR_MAKING_DECISIONS_DIFFICULTY: NO
ADLS_ACUITY_SCORE: 20
VISION_MANAGEMENT: GLASSES
ADLS_ACUITY_SCORE: 20
DRESSING/BATHING_DIFFICULTY: NO

## 2023-05-07 NOTE — CONSULTS
Inpatient Cardiology Consultation.   [facility]  Date of Admission: 5/6/2023  Date of Consult: 5/7/2023    PRIMARY CARDIOLOGY TEAM:  Dr. Austin Hercules.  Last seen on 9/27/2022.    REASON FOR CONSULT:  Paroxysmal atrial fibrillation with rapid rates.    HISTORY OF PRESENT ILLNESS:  Patient is a 62-year-old male with a history of single episode of paroxysmal atrial fibrillation and tachycardia mediated cardiomyopathy in the context of alcohol excess status post electrical cardioversion on 1/11/2021 and in sinus rhythm since (anticoagulation discontinued in July 2021 on recommendation of his cardiology team due to [id number] score of 1 and alcohol abstinence), hypertension, stable depression, previous alcohol dependence (sober since December 2019), obesity and mild obstructive sleep apnea (not on any therapy after shared decision making in the sleep clinic),    He presented to the emergency room with symptomatic tachypalpitations, and ECG confirmed atrial fibrillation with rapid rates of 130-150 bpm without acute ischemic changes.  He was given a bolus of IV amiodarone 150 mg and started on IV amiodarone infusion by primary hospitalist team infusion and IV diltiazem and heart rates are improved at 100 bpm with BP blood pressure of 127/85 and sats of 98%.    Labs - mild hyponatremia of 134, mild hypokalemia of 3.4, normal creatinine of 1.1, normal CBC, normal TSH, serial high-sensitivity troponin T negative.    I reviewed transesophageal echocardiogram dated 1/6/2021.  Moderately decreased LVEF of 35-40% with moderate global hypokinesis, mildly decreased right ventricular systolic function, moderately dilated left atrium, no significant valve disease.    Repeat echocardiogram ordered and pending.    DIAGNOSES:  1.  Paroxysmal atrial fibrillation with rates.  2.  History of tachycardia mediated cardiomyopathy with LVEF of 35-40% in the past.  Echocardiogram and LV assessment pending.  3.   Benign essential hypertension.  4.  Mild obstructive sleep apnea.  5.  Prior history of alcohol dependence, sober since December 2019.    ASSESSMENT:  Patient has recurrent symptomatic paroxysmal atrial fibrillation.  He says that over the last 6 months, he has been in and out of atrial fibrillation several times and even his primary care provider has commented that he may be intermittently in atrial fibrillation.  His last LVEF assessment is 35-40%.  Therefore, I do not recommend cardioverting him chemically or electrically without several weeks of  systemic anticoagulation or intracardiac thrombus rule out.    Plan will be for rate control with beta-blocker, transition to oral anticoagulation with Xarelto, get up-to-date echocardiogram and plan for elective electrical cardioversion in 3 to 4 weeks.  Patient is very comfortable with this plan.  He has been stable and minimally symptomatic at home and goes to [employer] and works out multiple times a week.    PLAN:  1.  Stop IV amiodarone.  Rationale above.  2.  Wean off IV diltiazem and increase metoprolol XL from 100 mg daily to 2 times daily and uptitrate as tolerated for resting heart rates of 60-80 bpm.  3.  Start rivaroxaban 20 mg daily.  Stop IV heparin infusion.  4.  Transthoracic echocardiogram pending.  5.  Plan elective electrical cardioversion in 3 to 4 weeks and follow-up with his primary cardiology team (Dr. Austin Hercules).  6.  Plan of care discussed with patient's RN.  Thank you for consulting cardiology.  We will follow.      Micaela Ivan MD, MD [facility]  Cardiology        CODE STATUS:  Full Code      REVIEW OF SYSTEMS:  A comprehensive 10 point review of systems was completed and the pertinent positives are documented in history of present illness.    FAMILY HISTORY:  Family History   Problem Relation Age of Onset     Arrhythmia Father         a-fib     Colon Polyps Sister      C.A.D. No family hx of      Diabetes No family hx of       Hypertension No family hx of      Cerebrovascular Disease No family hx of      Breast Cancer No family hx of      Cancer - colorectal No family hx of      Prostate Cancer No family hx of        MEDICATIONS:  Prior to Admission Medications   Prescriptions Last Dose Informant Patient Reported? Taking?   amLODIPine (NORVASC) 2.5 MG tablet 5/6/2023 Self No Yes   Sig: Take 1 tablet (2.5 mg) by mouth daily   aspirin 81 MG EC tablet 5/6/2023 Self Yes Yes   Sig: Take 81 mg by mouth daily   cloNIDine (CATAPRES) 0.1 MG tablet 5/6/2023 Self Yes Yes   Sig: Take 0.1 mg by mouth every morning   fish oil-omega-3 fatty acids 1000 MG capsule 5/6/2023 Self Yes Yes   Sig: Take 1 g by mouth daily   hydrochlorothiazide (HYDRODIURIL) 25 MG tablet 5/6/2023 Self Yes Yes   Sig: Take 25 mg by mouth daily   hypromellose (ARTIFICIAL TEARS) 0.5 % SOLN ophthalmic solution prn Self Yes Yes   Sig: Place 1 drop into both eyes every hour as needed for dry eyes   lisinopril (ZESTRIL) 10 MG tablet 5/6/2023 Self No Yes   Sig: Take 1 tablet (10 mg) by mouth daily   metoprolol succinate ER (TOPROL XL) 100 MG 24 hr tablet 5/6/2023 Self No Yes   Sig: Take 1 tablet (100 mg) by mouth daily   multivitamin, therapeutic (THERA-VIT) TABS tablet 5/6/2023 Self Yes Yes   Sig: Take 1 tablet by mouth daily   rosuvastatin (CRESTOR) 20 MG tablet 5/6/2023 Self No Yes   Sig: Take 1 tablet (20 mg) by mouth daily   thiamine (B-1) 100 MG tablet 5/6/2023 Self Yes Yes   Sig: Take 100 mg by mouth daily      Facility-Administered Medications: None       HOME MEDICATIONS:  Prior to Admission Medications   Prescriptions Last Dose Informant Patient Reported? Taking?   amLODIPine (NORVASC) 2.5 MG tablet 5/6/2023 Self No Yes   Sig: Take 1 tablet (2.5 mg) by mouth daily   aspirin 81 MG EC tablet 5/6/2023 Self Yes Yes   Sig: Take 81 mg by mouth daily   cloNIDine (CATAPRES) 0.1 MG tablet 5/6/2023 Self Yes Yes   Sig: Take 0.1 mg by mouth every morning   fish oil-omega-3 fatty acids 1000  MG capsule 5/6/2023 Self Yes Yes   Sig: Take 1 g by mouth daily   hydrochlorothiazide (HYDRODIURIL) 25 MG tablet 5/6/2023 Self Yes Yes   Sig: Take 25 mg by mouth daily   hypromellose (ARTIFICIAL TEARS) 0.5 % SOLN ophthalmic solution prn Self Yes Yes   Sig: Place 1 drop into both eyes every hour as needed for dry eyes   lisinopril (ZESTRIL) 10 MG tablet 5/6/2023 Self No Yes   Sig: Take 1 tablet (10 mg) by mouth daily   metoprolol succinate ER (TOPROL XL) 100 MG 24 hr tablet 5/6/2023 Self No Yes   Sig: Take 1 tablet (100 mg) by mouth daily   multivitamin, therapeutic (THERA-VIT) TABS tablet 5/6/2023 Self Yes Yes   Sig: Take 1 tablet by mouth daily   rosuvastatin (CRESTOR) 20 MG tablet 5/6/2023 Self No Yes   Sig: Take 1 tablet (20 mg) by mouth daily   thiamine (B-1) 100 MG tablet 5/6/2023 Self Yes Yes   Sig: Take 100 mg by mouth daily      Facility-Administered Medications: None       ALLERGIES:  No Known Allergies    PAST MEDICAL HISTORY:  Past Medical History:   Diagnosis Date     Atrial fibrillation with RVR (H) 12/28/2020     Depressive disorder      EtOH dependence (H)      Hypertension      Paroxysmal atrial fibrillation (H) 01/29/2010       PAST SURGICAL HISTORY:  Past Surgical History:   Procedure Laterality Date     ANESTHESIA CARDIOVERSION N/A 1/11/2021    Procedure: ANESTHESIA, FOR CARDIOVERSION;  Surgeon: GENERIC ANESTHESIA PROVIDER;  Location:  OR     ORTHOPEDIC SURGERY  1998    ACL Right knee reconstruction-        SOCIAL HISTORY:   Rashawn Short  reports that he has never smoked. He has never used smokeless tobacco. He reports that he does not currently use alcohol. He reports that he does not use drugs.      PHYSICAL EXAMINATION:  Temp: 98.2  F (36.8  C) Temp src: Oral BP: (!) 141/98 Pulse: 97   Resp: 16 SpO2: 97 % O2 Device: None (Room air)    05/02 1500 - 05/07 1459  In: 3114.94 [P.O.:1050; I.V.:2064.94]  Out: 1400 [Urine:1400]  Net: 7984.94  Vitals:    05/06/23 1811 05/06/23 2225   Weight: 99.8 kg  (220 lb) 99 kg (218 lb 4.1 oz)       Constitutional: Comfortable at rest. Cooperative, alert, well developed, well nourished. Normal BMI.  Eyes: Pupils reactive, no pallor or icterus.  ENT: No cyanosis or pallor.  Moist mucous membranes.  Cardiovascular: Normal jugular venous pulse and pressure.  Normal carotid pulse character and volume.  No carotid bruit.  Normal apical impulse.  Irregularly irregular heart sounds, no audible murmur.  Respiratory: Normal respiratory effort with symmetrical chest wall movements and no use of accessory muscles. Bilateral normal breath sounds. No rales or wheeze.  GI: Soft, nontender, active bowel sounds.  No hepatosplenomegaly, ascites or abdominal wall edema.  Skin: No erythema or ecchymosis. No pertinent skin findings.  Musculoskeletal: Normal muscle tone and strength.   Neuropsychiatric: Oriented to time place and person.  Affect normal.  No gross motor deficits.  Extremities: No edema or clubbing.    Clinically Significant Risk Factors Present on Admission          # Hypocalcemia: Lowest Ca = 8.2 mg/dL in last 2 days, will monitor and replace as appropriate         # Hypertension: home medication list includes antihypertensive(s)   # Acute Respiratory Failure: Documented O2 saturation < 91%.  Continue supplemental oxygen as needed            Cardiac Arrhythmia: Atrial fibrillation: Paroxysmal      Hectorilal Paulina Ivan MD, MD

## 2023-05-07 NOTE — ED NOTES
[facility]  ED Nurse Handoff Report    ED Chief complaint: Palpitations and Chest Pain      ED Diagnosis:   Final diagnoses:   Atrial fibrillation with RVR (H)       Code Status: Full Code    Allergies: No Known Allergies    Patient Story: This is a 62-year-old male with history of hypertension, depression, history of alcohol abuse in the past, now been sober for more than 5 years, history of paroxysmal atrial fibrillation, status post DC cardioversion on 01/11/2021 and since then, has been in normal sinus rhythm.  He came to the ED with complaint of palpitation, lightheadedness, and tingling in his hands and fingers.  According to the patient, he was doing fine and doing well and was driving with his mother and, all of a sudden on the highway, he started having palpitation like something pounding in his heart and felt lightheaded, not well, and had tingling in his eyes.  He pulled over on the side and called his sister, who advised him to call 911.  He did call the 911 and when they arrived, his heart rate was in 150-170 and they brought him to the ED.  In the ED, he was found to be in AFib with RVR with heart rate ranging from 130-150. He was given a dose of Cardizem and now his heart rate is improved to 120s and he is feeling much better now.  The patient said that he has been feeling well now. He has no headache, dizziness, lightheadedness.  No chest pain, shortness of breath, orthopnea, PND, palpitation.  No fever, chills or cough.  No abdominal pain, back pain, dysuria, hematuria, constipation or diarrhea at this time.  He has been compliant with his medication.  He took his metoprolol, lisinopril, and clonidine this morning.  He is not on any anticoagulation.  Focused Assessment:  See above    Treatments and/or interventions provided: Diltiazem push, Amiodarone drip, Heparin drip  Patient's response to treatments and/or interventions: -120    To be done/followed up on inpatient unit:   Inpatient orders    Does this patient have any cognitive concerns?: Forgetful    Activity level - Baseline/Home:  Independent  Activity Level - Current:   Stand with Assist (SBA)    Patient's Preferred language: English   Needed?: No    Isolation: None  Infection: Not Applicable  Patient tested for COVID 19 prior to admission: NO  Bariatric?: No    Vital Signs:   Vitals:    05/06/23 1618 05/06/23 1633 05/06/23 1646 05/06/23 1811   BP:  (!) 135/111     Pulse: (!) 152 (!) 147 (!) 137    Resp: 24 30 (!) 36    Temp:       TempSrc:       SpO2: 98% (!) 82% 94%    Weight:    99.8 kg (220 lb)       For the majority of the shift this patient's behavior was Green.   Behavioral interventions performed were N/A.    ED NURSE PHONE NUMBER: *[phone]

## 2023-05-07 NOTE — PROGRESS NOTES
"2210 Arrived via gurney from ED. Belongings at bedside. On amiodarone and heparin drips. States \"I just want to go to bed.\" No reportable events during transport per transport RNs.   "

## 2023-05-07 NOTE — PROGRESS NOTES
[facility]    Hospitalist Progress Note    Interval History   Patient awake and alert.  No acute events overnight.  Continues to be in A-fib RVR.  Does endorse being very fatigued.    -Data reviewed today: I reviewed all new labs and imaging results over the last 24 hours. I personally reviewed the EKG tracing showing A-fib RVR.    Physical Exam   Temp: 98.6  F (37  C) Temp src: Oral BP: 127/85 (Post CR) Pulse: 102   Resp: 18 SpO2: 98 % O2 Device: None (Room air)    Vitals:    05/06/23 1811 05/06/23 2225   Weight: 99.8 kg (220 lb) 99 kg (218 lb 4.1 oz)     Vital Signs with Ranges  Temp:  [98  F (36.7  C)-99.1  F (37.3  C)] 98.6  F (37  C)  Pulse:  [] 102  Resp:  [0-36] 18  BP: ()/() 127/85  SpO2:  [82 %-99 %] 98 %  I/O last 3 completed shifts:  In: 1384.31 [P.O.:350; I.V.:1034.31]  Out: -     Physical Exam  Constitutional:       Appearance: Normal appearance.   HENT:      Head: Normocephalic.   Eyes:      Pupils: Pupils are equal, round, and reactive to light.   Cardiovascular:      Rate and Rhythm: Tachycardia present. Rhythm irregular.      Pulses: Normal pulses.      Heart sounds: Normal heart sounds.   Pulmonary:      Effort: Pulmonary effort is normal. No respiratory distress.      Breath sounds: Normal breath sounds.   Abdominal:      General: Abdomen is flat. Bowel sounds are normal. There is no distension.      Tenderness: There is no abdominal tenderness. There is no guarding.   Musculoskeletal:         General: Normal range of motion.      Cervical back: Normal range of motion.   Skin:     General: Skin is warm and dry.   Neurological:      General: No focal deficit present.   Psychiatric:         Mood and Affect: Mood normal.           Medications     amiodarone 0.5 mg/min (05/07/23 0859)     heparin 1,150 Units/hr (05/07/23 1156)     sodium chloride 100 mL/hr at 05/07/23 1031       aspirin  81 mg Oral Daily     cloNIDine  0.1 mg Oral BID     metoprolol  succinate ER  100 mg Oral Daily     rosuvastatin  20 mg Oral Daily     sodium chloride (PF)  3 mL Intracatheter Q8H       Data   Recent Labs   Lab 05/07/23  0554 05/07/23  0031 05/06/23  1732 05/06/23  1644   WBC 10.8 13.0*  --  13.1*   HGB 15.6 16.2  --  19.0*   MCV 88 87  --  89    186  --  215   INR  --   --  1.08  --    *  --   --  138   POTASSIUM 3.4  3.4  --   --  3.6   CHLORIDE 99  --   --  96*   CO2 20*  --   --  27   BUN 18.0  --   --  22.7   CR 1.10  --   --  1.26*   ANIONGAP 15  --   --  15   KELSEY 8.2*  --   --  9.7   *  --   --  127*   ALBUMIN 3.6  --   --   --        No results found for this or any previous visit (from the past 24 hour(s)).      Assessment & Plan      This is a 62-year-old male with history of hypertension, depression, history of alcohol abuse in the past, now been sober for more than 5 years, history of paroxysmal atrial fibrillation, status post DC cardioversion on 01/11/2021 and since then, has been in normal sinus rhythm.  He came to the ED with complaint of palpitation, lightheadedness, and tingling in his hands and fingers.     ASSESSMENT AND PLAN:    1.  Atrial fibrillation with RVR:  This is a 62-year-old male with history of AFib in the past requiring DC cardioversion in 01/2021 and has been in normal sinus rhythm since then.  He was on anticoagulation and amiodarone for a short period of time, which was discontinued, and the patient is currently only on baby aspirin. Patient  did really well with cardioversion in the past. Now he is back in AFib with RVR.  He is not drinking.  No other symptoms other than his symptoms of palpitation.   -Patient received a dose of IV Cardizem 15 mg in the ER and 150 mg IV amiodarone bolus.  Currently on IV amiodarone infusion.  -Started on IV heparin drip for anticoagulation.  -Cardiology consulted to evaluate for possible repeat cardioversion.  -Echocardiogram ordered and pending.  -Continue home metoprolol.   -Troponin  trend negative and flat.  TSH within normal limits.    2.  Acute renal insufficiency:  The patient's creatinine is usually ranging at 1.06.  -Repeat BMP in the morning showed normalized creatinine.  Continue to monitor closely.  Avoid nephrotoxic medications.      3.  Polycythemia and leukocytosis:  The patient does have polycythemia with hematocrit of 58.5 and hemoglobin of 19.0, most likely secondary to dehydration.    -Patient hemoglobin improved to 15.6 with hydration.  Leukocytosis resolved this morning.  Continue to monitor.    4.  Leukocytosis is most likely stress reaction to his AFib with RVR.  No obvious sign of infection at this time.    5.  Hypertension:  Blood pressure is stable on current medication.    -Currently on metoprolol, amlodipine, clonidine.  Holding his lisinopril and hydrochlorothiazide in the setting of NAT on admission.  Will restart tomorrow if his renal function continues to remain stable.    6.  Hyperlipidemia: On Crestor 20.  We will continue with that.     7.  Anxiety, depression: Has been stable. Currently not on any medication for that.    8.  DVT prophylaxis with IV heparin.     CODE STATUS:  Full code as per the patient wishes.       Disposition: Expected discharge in 2 days once stable              Clinically Significant Risk Factors Present on Admission          # Hypocalcemia: Lowest Ca = 8.2 mg/dL in last 2 days, will monitor and replace as appropriate         # Hypertension: home medication list includes antihypertensive(s)   # Acute Respiratory Failure: Documented O2 saturation < 91%.  Continue supplemental oxygen as needed                  Sophy Garnett MD, MD  [phone](p)

## 2023-05-07 NOTE — PLAN OF CARE
VSS, remains in afib, started on xaralto, no c/o pain, plan for discharge tomorrow, continue to monitor closely.

## 2023-05-07 NOTE — PROGRESS NOTES
RECEIVING UNIT ED HANDOFF REVIEW    ED Nurse Handoff Report was reviewed by: Mary Cameron RN on May 6, 2023 at 9:28 PM

## 2023-05-07 NOTE — PROGRESS NOTES
Resumed care 2220- 0730    Neuro: WDL. PERRL. YEPEZ to command. No neurologic deficits.   CV: Remains in atrial fibrillation with RVR, max 's. Remains on amiodarone drip. Afebrile. VSS.   Respiratory: Lung sounds clear on RA. Oxygen saturations WDL.   GI/: Flatus present. Up to BR to urinate. Multiple requests to use urinal without success.   Skin: No observed skin impairments.   Activity: Independent. Up ad robson.   Diet: Regular diet.   Drips: Amiodarone, heparin and maintenance fluid.     Plan: Rate control for atrial fibrillation. Cardiac work up.

## 2023-05-08 ENCOUNTER — APPOINTMENT (OUTPATIENT)
Dept: PHYSICAL THERAPY | Facility: CLINIC | Age: 63
End: 2023-05-08
Payer: COMMERCIAL

## 2023-05-08 ENCOUNTER — APPOINTMENT (OUTPATIENT)
Dept: CARDIOLOGY | Facility: CLINIC | Age: 63
End: 2023-05-08
Attending: INTERNAL MEDICINE
Payer: COMMERCIAL

## 2023-05-08 VITALS
OXYGEN SATURATION: 94 % | RESPIRATION RATE: 16 BRPM | WEIGHT: 216.2 LBS | DIASTOLIC BLOOD PRESSURE: 88 MMHG | HEART RATE: 80 BPM | SYSTOLIC BLOOD PRESSURE: 128 MMHG | BODY MASS INDEX: 31.02 KG/M2 | TEMPERATURE: 98.4 F

## 2023-05-08 LAB
ALBUMIN UR-MCNC: NEGATIVE MG/DL
APPEARANCE UR: CLEAR
BILIRUB UR QL STRIP: NEGATIVE
COLOR UR AUTO: NORMAL
CREAT SERPL-MCNC: 1 MG/DL (ref 0.67–1.17)
CREAT UR-MCNC: 47.5 MG/DL
FRACT EXCRET NA UR+SERPL-RTO: 1.3 %
GFR SERPL CREATININE-BSD FRML MDRD: 85 ML/MIN/1.73M2
GLUCOSE UR STRIP-MCNC: NEGATIVE MG/DL
HGB UR QL STRIP: NEGATIVE
KETONES UR STRIP-MCNC: NEGATIVE MG/DL
LEUKOCYTE ESTERASE UR QL STRIP: NEGATIVE
LVEF ECHO: NORMAL
MAGNESIUM SERPL-MCNC: 2 MG/DL (ref 1.7–2.3)
NITRATE UR QL: NEGATIVE
PH UR STRIP: 7 [PH] (ref 5–7)
POTASSIUM SERPL-SCNC: 3.7 MMOL/L (ref 3.4–5.3)
SODIUM SERPL-SCNC: 136 MMOL/L (ref 136–145)
SODIUM UR-SCNC: 83 MMOL/L
SP GR UR STRIP: 1.01 (ref 1–1.03)
UROBILINOGEN UR STRIP-MCNC: NORMAL MG/DL

## 2023-05-08 PROCEDURE — 84295 ASSAY OF SERUM SODIUM: CPT | Performed by: HOSPITALIST

## 2023-05-08 PROCEDURE — 99239 HOSP IP/OBS DSCHRG MGMT >30: CPT | Performed by: HOSPITALIST

## 2023-05-08 PROCEDURE — 93306 TTE W/DOPPLER COMPLETE: CPT | Mod: 26 | Performed by: INTERNAL MEDICINE

## 2023-05-08 PROCEDURE — 250N000013 HC RX MED GY IP 250 OP 250 PS 637: Performed by: INTERNAL MEDICINE

## 2023-05-08 PROCEDURE — 36415 COLL VENOUS BLD VENIPUNCTURE: CPT | Performed by: INTERNAL MEDICINE

## 2023-05-08 PROCEDURE — 83735 ASSAY OF MAGNESIUM: CPT | Performed by: INTERNAL MEDICINE

## 2023-05-08 PROCEDURE — 84132 ASSAY OF SERUM POTASSIUM: CPT | Performed by: INTERNAL MEDICINE

## 2023-05-08 PROCEDURE — 82565 ASSAY OF CREATININE: CPT | Performed by: HOSPITALIST

## 2023-05-08 PROCEDURE — 84300 ASSAY OF URINE SODIUM: CPT | Performed by: INTERNAL MEDICINE

## 2023-05-08 PROCEDURE — 81003 URINALYSIS AUTO W/O SCOPE: CPT | Performed by: INTERNAL MEDICINE

## 2023-05-08 PROCEDURE — 93306 TTE W/DOPPLER COMPLETE: CPT

## 2023-05-08 PROCEDURE — 99222 1ST HOSP IP/OBS MODERATE 55: CPT | Mod: 25 | Performed by: INTERNAL MEDICINE

## 2023-05-08 PROCEDURE — 97110 THERAPEUTIC EXERCISES: CPT | Mod: GP

## 2023-05-08 RX ORDER — DILTIAZEM HYDROCHLORIDE 120 MG/1
120 CAPSULE, COATED, EXTENDED RELEASE ORAL DAILY
Status: DISCONTINUED | OUTPATIENT
Start: 2023-05-08 | End: 2023-05-08 | Stop reason: HOSPADM

## 2023-05-08 RX ORDER — DILTIAZEM HYDROCHLORIDE 120 MG/1
120 CAPSULE, COATED, EXTENDED RELEASE ORAL DAILY
Qty: 90 CAPSULE | Refills: 3 | Status: SHIPPED | OUTPATIENT
Start: 2023-05-09 | End: 2023-06-06

## 2023-05-08 RX ORDER — METOPROLOL SUCCINATE 100 MG/1
100 TABLET, EXTENDED RELEASE ORAL 2 TIMES DAILY
Qty: 60 TABLET | Refills: 3 | Status: SHIPPED | OUTPATIENT
Start: 2023-05-08 | End: 2023-09-29

## 2023-05-08 RX ADMIN — DILTIAZEM HYDROCHLORIDE 120 MG: 120 CAPSULE, COATED, EXTENDED RELEASE ORAL at 10:35

## 2023-05-08 RX ADMIN — METOPROLOL SUCCINATE 100 MG: 100 TABLET, EXTENDED RELEASE ORAL at 08:48

## 2023-05-08 RX ADMIN — ROSUVASTATIN CALCIUM 20 MG: 20 TABLET, FILM COATED ORAL at 08:48

## 2023-05-08 RX ADMIN — CLONIDINE HYDROCHLORIDE 0.1 MG: 0.1 TABLET ORAL at 08:48

## 2023-05-08 ASSESSMENT — ACTIVITIES OF DAILY LIVING (ADL)
ADLS_ACUITY_SCORE: 20

## 2023-05-08 NOTE — CONSULTS
[facility]    Cardiac Electrophysiology Consultation     Date of Admission:  5/6/2023  Date of Consult (When I saw the patient): 05/08/23    Assessment & Plan   Rashawn Short is a 62 year old male who was admitted on 5/6/2023. I was asked to see the patient for recurrent AF. Delightful patient with AF noted during a colonoscopy a few years ago, s/p FREDI guided cardioversion. LVEF was down at that time. Noted to be in AF intermittently over the past several months until the weekend when persistent. ECG in ED shows AF with RVR at 130 bpm. IV amiodarone given which was changed to Dilt iv. Xarelto started. Feeling much better. Rate < 100 bpm. Normal LVEF.    Symptomatic persistent AF of unknown duration. Less sxs with better rate. Add diltiazem to current bb. Reassess in a month. If no sxs one could argue for rate control. If not proceed with cardioversion. Ok to be discharged from our perspective and will arrange for follow up.    Skinny Thornton MD    Code Status    Full Code    Primary Care Physician   Bárbara Young    History is obtained from the patient    Past Medical History   I have reviewed this patient's medical history and updated it with pertinent information if needed.   Past Medical History:   Diagnosis Date     Atrial fibrillation with RVR (H) 12/28/2020     Depressive disorder      EtOH dependence (H)      Hypertension      Paroxysmal atrial fibrillation (H) 01/29/2010       Past Surgical History   I have reviewed this patient's surgical history and updated it with pertinent information if needed.  Past Surgical History:   Procedure Laterality Date     ANESTHESIA CARDIOVERSION N/A 1/11/2021    Procedure: ANESTHESIA, FOR CARDIOVERSION;  Surgeon: GENERIC ANESTHESIA PROVIDER;  Location:  OR     ORTHOPEDIC SURGERY  1998    ACL Right knee reconstruction-        Prior to Admission Medications   Prior to Admission Medications   Prescriptions Last Dose Informant Patient Reported?  Taking?   amLODIPine (NORVASC) 2.5 MG tablet 5/6/2023 Self No Yes   Sig: Take 1 tablet (2.5 mg) by mouth daily   aspirin 81 MG EC tablet 5/6/2023 Self Yes Yes   Sig: Take 81 mg by mouth daily   cloNIDine (CATAPRES) 0.1 MG tablet 5/6/2023 Self Yes Yes   Sig: Take 0.1 mg by mouth every morning   fish oil-omega-3 fatty acids 1000 MG capsule 5/6/2023 Self Yes Yes   Sig: Take 1 g by mouth daily   hydrochlorothiazide (HYDRODIURIL) 25 MG tablet 5/6/2023 Self Yes Yes   Sig: Take 25 mg by mouth daily   hypromellose (ARTIFICIAL TEARS) 0.5 % SOLN ophthalmic solution prn Self Yes Yes   Sig: Place 1 drop into both eyes every hour as needed for dry eyes   lisinopril (ZESTRIL) 10 MG tablet 5/6/2023 Self No Yes   Sig: Take 1 tablet (10 mg) by mouth daily   metoprolol succinate ER (TOPROL XL) 100 MG 24 hr tablet 5/6/2023 Self No Yes   Sig: Take 1 tablet (100 mg) by mouth daily   multivitamin, therapeutic (THERA-VIT) TABS tablet 5/6/2023 Self Yes Yes   Sig: Take 1 tablet by mouth daily   rosuvastatin (CRESTOR) 20 MG tablet 5/6/2023 Self No Yes   Sig: Take 1 tablet (20 mg) by mouth daily   thiamine (B-1) 100 MG tablet 5/6/2023 Self Yes Yes   Sig: Take 100 mg by mouth daily      Facility-Administered Medications: None     Allergies   No Known Allergies    Social History   I have reviewed this patient's social history and updated it with pertinent information if needed. Rashawn Short  reports that he has never smoked. He has never used smokeless tobacco. He reports that he does not currently use alcohol. He reports that he does not use drugs.    Family History   I have reviewed this patient's family history and updated it with pertinent information if needed.   Family History   Problem Relation Age of Onset     Arrhythmia Father         a-fib     Colon Polyps Sister      C.A.D. No family hx of      Diabetes No family hx of      Hypertension No family hx of      Cerebrovascular Disease No family hx of      Breast Cancer No family hx of       Cancer - colorectal No family hx of      Prostate Cancer No family hx of        Review of Systems   Comprehensive review of systems was performed with pertinent positives and negatives listed in assessment and plan section.    Physical Exam   Temp: 98.4  F (36.9  C) Temp src: Oral BP: (!) 142/108 Pulse: 85   Resp: 18 SpO2: 94 % O2 Device: None (Room air)    Vital Signs with Ranges  Temp:  [98  F (36.7  C)-98.4  F (36.9  C)] 98.4  F (36.9  C)  Pulse:  [] 85  Resp:  [16-18] 18  BP: (118-142)/() 142/108  SpO2:  [93 %-99 %] 94 %  216 lbs 3.2 oz    Constitutional: awake, alert, cooperative, no apparent distress, and appears stated age  Eyes: Lids and lashes normal, pupils equal, round and reactive to light, extra ocular muscles intact, sclera clear, conjunctiva normal  ENT: Normocephalic, without obvious abnormality, atraumatic, sinuses nontender on palpation, external ears without lesions, oral pharynx with moist mucous membranes, tonsils without erythema or exudates, gums normal and good dentition.  Hematologic / Lymphatic: no cervical lymphadenopathy  Respiratory: No increased work of breathing, good air exchange, clear to auscultation bilaterally, no crackles or wheezing  Cardiovascular: irregularly irregular rhythm  GI: No scars, normal bowel sounds, soft, non-distended, non-tender, no masses palpated, no hepatosplenomegally  Skin: no bruising or bleeding  Musculoskeletal: There is no redness, warmth, or swelling of the joints.  Full range of motion noted.    Neurologic: Awake, alert,   Neuropsychiatric: Level of consciousness: alert / normal    Data   I personally reviewed all recent ECGs and images.  Results for orders placed or performed during the hospital encounter of 05/06/23 (from the past 24 hour(s))   Heparin Unfractionated Anti Xa Level   Result Value Ref Range    Anti Xa Unfractionated Heparin 0.16 For Reference Range, See Comment IU/mL    Narrative    Therapeutic Range: UFH: 0.25-0.50  IU/mL for low intensity dosing,  0.30-0.70 IU/mL for high intensity dosing DVT and PE.  This test is not validated for other direct factor X inhibitors (e.g. rivaroxaban, apixaban, edoxaban, betrixaban, fondaparinux) and should not be used for monitoring of other medications.   EKG 12-lead, tracing only   Result Value Ref Range    Systolic Blood Pressure  mmHg    Diastolic Blood Pressure  mmHg    Ventricular Rate 95 BPM    Atrial Rate 340 BPM    IN Interval  ms    QRS Duration 102 ms     ms    QTc 487 ms    P Axis  degrees    R AXIS 65 degrees    T Axis 54 degrees    Interpretation ECG       Atrial fibrillation  Cannot rule out Anterior infarct (cited on or before 06-MAY-2023)  Abnormal ECG  When compared with ECG of 06-MAY-2023 15:47,  Criteria for Inferior infarct are no longer Present  Serial changes of Anterior infarct Present     Potassium   Result Value Ref Range    Potassium 3.7 3.4 - 5.3 mmol/L   Magnesium   Result Value Ref Range    Magnesium 2.0 1.7 - 2.3 mg/dL   Potassium   Result Value Ref Range    Potassium 3.7 3.4 - 5.3 mmol/L   Creatinine   Result Value Ref Range    Creatinine 1.00 0.67 - 1.17 mg/dL    GFR Estimate 85 >60 mL/min/1.73m2   Sodium   Result Value Ref Range    Sodium 136 136 - 145 mmol/L   UA Macroscopic with reflex to Microscopic and Culture    Specimen: Urine, Clean Catch   Result Value Ref Range    Color Urine Light Yellow Colorless, Straw, Light Yellow, Yellow    Appearance Urine Clear Clear    Glucose Urine Negative Negative mg/dL    Bilirubin Urine Negative Negative    Ketones Urine Negative Negative mg/dL    Specific Gravity Urine 1.008 1.003 - 1.035    Blood Urine Negative Negative    pH Urine 7.0 5.0 - 7.0    Protein Albumin Urine Negative Negative mg/dL    Urobilinogen Urine Normal Normal, 2.0 mg/dL    Nitrite Urine Negative Negative    Leukocyte Esterase Urine Negative Negative    Narrative    Microscopic not indicated   Fractional excretion of sodium    Narrative    The  following orders were created for panel order Fractional excretion of sodium.  Procedure                               Abnormality         Status                     ---------                               -----------         ------                     Fractional Excretion of ...[[id number]]                      In process                 Sodium[[id number]]                                                                      Creatinine, serum[[id number]]                                                             Please view results for these tests on the individual orders.

## 2023-05-08 NOTE — PLAN OF CARE
Physical Therapy Discharge Summary    Reason for therapy discharge:    Discharged to home.    Progress towards therapy goal(s). See goals on Care Plan in [facility] electronic health record for goal details.  Goals met    Therapy recommendation(s):    No further therapy is recommended.

## 2023-05-08 NOTE — PLAN OF CARE
A&Ox4. VSS on RA ex HR occasionally in low 100s. Tele afib CVR/RVR. Up ad robson in room. Denies CP/dizziness/nausea. Pt started on xarelto yesterday. Regular diet. Plan for TTE today and possible discharge home afterwards with tentative plan for cardioversion OP.

## 2023-05-08 NOTE — PLAN OF CARE
Goal Outcome Evaluation:      Plan of Care Reviewed With: patient      Discharge home with son, up in the room independent, tolerated food, VSS, RA. Blood pressure is better after medications.

## 2023-05-08 NOTE — DISCHARGE SUMMARY
[facility]    Discharge Summary  Hospitalist    Date of Admission:  5/6/2023  Date of Discharge:  5/8/2023    Discharge Diagnoses      Atrial fibrillation with RVR (H)  Paroxysmal atrial fibrillation (H)    History of Present Illness   Rashawn Short is an 62 year old male who presented with AFIB RVR    Hospital Course   Rashawn Short was admitted on 5/6/2023.  The following problems were addressed during his hospitalization:    This is a 62-year-old male with history of hypertension, depression, history of alcohol abuse in the past, now been sober for more than 5 years, history of paroxysmal atrial fibrillation, status post DC cardioversion on 01/11/2021 and since then, has been in normal sinus rhythm.  He came to the ED with complaint of palpitation, lightheadedness, and tingling in his hands and fingers.     ASSESSMENT AND PLAN:    1.  Atrial fibrillation with RVR:  This is a 62-year-old male with history of AFib in the past requiring DC cardioversion in 01/2021 and has been in normal sinus rhythm since then.  He was on anticoagulation and amiodarone for a short period of time, which was discontinued, and the patient is currently only on baby aspirin. Patient  did really well with cardioversion in the past. Now he is back in AFib with RVR.  He is not drinking.  No other symptoms other than his symptoms of palpitation.   -Patient received a dose of IV Cardizem 15 mg in the ER and 150 mg IV amiodarone bolus.  Currently on IV amiodarone infusion.  -Started on IV heparin drip for anticoagulation.  Patient was eventually transitioned to rivaroxaban.  -Cardiology consulted to evaluate for possible repeat cardioversion.  -Echocardiogram done.  EF significantly improved from before and is currently at 50 to 55%.  -Continue home metoprolol.   Added oral diltiazem with better control of the heart rates.  -Troponin trend negative and flat.  TSH within normal limits.  -Electro physiology team  evaluated the patient and the plan is to have him follow-up in 3 weeks for possible outpatient cardioversion versus continuing rate control.  -Patient was discharged on oral metoprolol and diltiazem and anticoagulation.    2.  Acute renal insufficiency:  The patient's creatinine is usually ranging at 1.06.  -Repeat BMP in the morning showed normalized creatinine.  Continue to monitor closely.  Avoid nephrotoxic medications.      3.  Polycythemia and leukocytosis:  The patient does have polycythemia with hematocrit of 58.5 and hemoglobin of 19.0, most likely secondary to dehydration.    -Patient hemoglobin improved to 15.6 with hydration.  Leukocytosis resolved this morning.  Continue to monitor.    4.  Leukocytosis is most likely stress reaction to his AFib with RVR.  No obvious sign of infection at this time.    5.  Hypertension:  Blood pressure is stable on current medication.    -Currently on metoprolol, amlodipine, clonidine.  Holding his lisinopril and hydrochlorothiazide in the setting of NAT on admission.  Will restart tomorrow if his renal function continues to remain stable.    6.  Hyperlipidemia: On Crestor 20.  We will continue with that.     7.  Anxiety, depression: Has been stable. Currently not on any medication for that.    8.  DVT prophylaxis with DOAC     CODE STATUS:  Full code as per the patient wishes.       Disposition: Expected discharge in 2 days once stable                 Clinically Significant Risk Factors          # Hypocalcemia: Lowest Ca = 8.2 mg/dL in last 2 days, will monitor and replace as appropriate                        Sophy Garnett MD, MD    Code Status   Full Code       Primary Care Physician   Bárbara Young    Physical Exam   Temp: 98.4  F (36.9  C) Temp src: Oral BP: 128/88 Pulse: 80   Resp: 16 SpO2: 94 % O2 Device: None (Room air)    Vitals:    05/06/23 1811 05/06/23 2225 05/08/23 0500   Weight: 99.8 kg (220 lb) 99 kg (218 lb 4.1 oz) 98.1 kg (216 lb 3.2 oz)     Vital  Signs with Ranges  Temp:  [98  F (36.7  C)-98.4  F (36.9  C)] 98.4  F (36.9  C)  Pulse:  [74-97] 80  Resp:  [16-18] 16  BP: (128-142)/() 128/88  SpO2:  [93 %-98 %] 94 %  I/O last 3 completed shifts:  In: 2064.56 [P.O.:1000; I.V.:1064.56]  Out: 1400 [Urine:1400]    Physical Exam  Constitutional:       Appearance: Normal appearance.   HENT:      Head: Normocephalic.   Eyes:      Pupils: Pupils are equal, round, and reactive to light.   Cardiovascular:      Rate and Rhythm: Tachycardia present. Rhythm irregular.      Pulses: Normal pulses.      Heart sounds: Normal heart sounds.   Pulmonary:      Effort: Pulmonary effort is normal. No respiratory distress.      Breath sounds: Normal breath sounds.   Abdominal:      General: Abdomen is flat. Bowel sounds are normal. There is no distension.      Tenderness: There is no abdominal tenderness. There is no guarding.   Musculoskeletal:         General: Normal range of motion.      Cervical back: Normal range of motion.   Skin:     General: Skin is warm and dry.   Neurological:      General: No focal deficit present.   Psychiatric:         Mood and Affect: Mood normal.           Discharge Disposition   Discharged to home  Condition at discharge: Stable    Consultations This Hospital Stay   PHARMACY IP CONSULT  PHYSICAL THERAPY ADULT IP CONSULT  CARDIOLOGY IP CONSULT  PHARMACY LIAISON FOR MEDICATION COVERAGE CONSULT    Time Spent on this Encounter   Sophy MONTALVO MD, personally saw the patient today and spent greater than 30 minutes discharging this patient.    Discharge Orders      Follow-Up with Cardiology CHRISTY      Follow-Up with Cardiology CHRISTY      Reason for your hospital stay    AFIB RVR     Follow-up and recommended labs and tests     Follow up with primary care provider, Bárbara Young, within 7 days for hospital follow- up.  The following labs/tests are recommended: cbc, bmp in 1 week.     Activity    Your activity upon discharge: activity as tolerated      Diet    Follow this diet upon discharge: Orders Placed This Encounter      Combination Diet Regular Diet Adult     Discharge Medications   Current Discharge Medication List      START taking these medications    Details   diltiazem ER COATED BEADS (CARDIZEM CD/CARTIA XT) 120 MG 24 hr capsule Take 1 capsule (120 mg) by mouth daily  Qty: 90 capsule, Refills: 3    Associated Diagnoses: Paroxysmal atrial fibrillation (H)      rivaroxaban ANTICOAGULANT (XARELTO) 20 MG TABS tablet Take 1 tablet (20 mg) by mouth daily (with dinner)  Qty: 90 tablet, Refills: 3    Associated Diagnoses: Paroxysmal atrial fibrillation (H)         CONTINUE these medications which have CHANGED    Details   metoprolol succinate ER (TOPROL XL) 100 MG 24 hr tablet Take 1 tablet (100 mg) by mouth 2 times daily  Qty: 60 tablet, Refills: 3    Associated Diagnoses: Paroxysmal atrial fibrillation (H)         CONTINUE these medications which have NOT CHANGED    Details   cloNIDine (CATAPRES) 0.1 MG tablet Take 0.1 mg by mouth every morning      fish oil-omega-3 fatty acids 1000 MG capsule Take 1 g by mouth daily      hypromellose (ARTIFICIAL TEARS) 0.5 % SOLN ophthalmic solution Place 1 drop into both eyes every hour as needed for dry eyes      lisinopril (ZESTRIL) 10 MG tablet Take 1 tablet (10 mg) by mouth daily  Qty: 90 tablet, Refills: 3    Associated Diagnoses: Hypertension goal BP (blood pressure) < 140/90      multivitamin, therapeutic (THERA-VIT) TABS tablet Take 1 tablet by mouth daily      rosuvastatin (CRESTOR) 20 MG tablet Take 1 tablet (20 mg) by mouth daily  Qty: 90 tablet, Refills: 3    Associated Diagnoses: Hyperlipidemia LDL goal <130      thiamine (B-1) 100 MG tablet Take 100 mg by mouth daily         STOP taking these medications       amLODIPine (NORVASC) 2.5 MG tablet Comments:   Reason for Stopping:         aspirin 81 MG EC tablet Comments:   Reason for Stopping:         hydrochlorothiazide (HYDRODIURIL) 25 MG tablet Comments:    Reason for Stopping:             Allergies   No Known Allergies  Data   Recent Labs   Lab Test 23  0554 23  0031 23  1732 23  1644 21  0820 20  0653 20  0946   WBC 10.8 13.0*  --  13.1*  --    < > 7.5   HGB 15.6 16.2  --  19.0*  --    < > 17.3   MCV 88 87  --  89  --    < > 89    186  --  215  --    < > 240   INR  --   --  1.08  --  1.74*  --  0.98    < > = values in this interval not displayed.      Recent Labs   Lab Test 23  0621 23  1306 23  0554 23  1644 21  1304 21  1304     --  134* 138  --  136   POTASSIUM 3.7 3.7 3.4  3.4 3.6   < > 4.0   CHLORIDE  --   --  99 96*  --  103   CO2  --   --  20* 27  --  29   BUN  --   --  18.0 22.7  --  16   CR 1.00  --  1.10 1.26*  --  1.06   ANIONGAP  --   --  15 15  --  4   KELSEY  --   --  8.2* 9.7  --  9.3   GLC  --   --  115* 127*  --  81    < > = values in this interval not displayed.         Results for orders placed or performed during the hospital encounter of 23   Echocardiogram Complete     Value    LVEF  50-55%    Narrative    [id number]  [id number]  [id number]  [id number]^EDITH^MARYAN^ANDRE     [facility]  Echocardiography Laboratory  [address] [place] [address]     Name: LATRICE ROME  MRN: [id number]  : 1960  Study Date: 2023 08:10 AM  Age: 62 yrs  Gender: Male  Patient Location: [facility]  Reason For Study: Atrial Fibrillation  Ordering Physician: MARYAN SHARMA  Referring Physician: Bárbara Young MD  Performed By: Ines Cortes RDCS     BSA: 2.2 m2  Height: 70 in  Weight: 220 lb  HR: 92  BP: 149/116 mmHg  ______________________________________________________________________________  Procedure  Complete Echo Adult.  ______________________________________________________________________________  Interpretation Summary     1. The left ventricle is normal in size. The visual ejection fraction is 50-  55%.  2. The right  ventricle is normal in structure, function and size.  3. No valve disease.  4. The ascending aorta is Mildly dilated. 4.0cm.     Echo 2021 showed EF 35-40%.  ______________________________________________________________________________  Left Ventricle  The left ventricle is normal in size. There is normal left ventricular wall  thickness. The visual ejection fraction is 50-55%. Diastolic function not  assessed due to atrial fibrillation. Normal left ventricular wall motion.     Right Ventricle  The right ventricle is normal in structure, function and size.     Atria  The left atrium is mildly dilated. The right atrium is mildly dilated. There  is no atrial shunt seen.     Mitral Valve  There is trace mitral regurgitation.     Tricuspid Valve  There is mild (1+) tricuspid regurgitation. The right ventricular systolic  pressure is approximated at 15.0 mmHg plus the right atrial pressure.     Aortic Valve  The aortic valve is normal in structure and function.     Pulmonic Valve  The pulmonic valve is normal in structure and function.     Vessels  The ascending aorta is Mildly dilated. The inferior vena cava was normal in  size with preserved respiratory variability.     Pericardium  There is no pericardial effusion.     Rhythm  The rhythm was atrial fibrillation.  ______________________________________________________________________________  MMode/2D Measurements & Calculations  IVSd: 1.1 cm     LVIDd: 4.9 cm  LVIDs: 3.6 cm  LVPWd: 1.1 cm  FS: 26.6 %  LV mass(C)d: 201.9 grams  LV mass(C)dI: 92.9 grams/m2  Ao root diam: 3.3 cm  LA dimension: 5.4 cm  asc Aorta Diam: 3.9 cm  LA/Ao: 1.6  LA Volume (BP): 85.3 ml  LA Volume Index (BP): 39.3 ml/m2  RV Base: 4.1 cm  RWT: 0.45     TAPSE: 2.3 cm     Doppler Measurements & Calculations  MV E max trever: 94.2 cm/sec  MV dec slope: 630.0 cm/sec2  MV dec time: 0.15 sec  PA acc time: 0.12 sec  TR max trever: 200.9 cm/sec  TR max PG: 15.0 mmHg  E/E' av.0  Lateral E/e': 5.9  Medial  E/e': 8.2  RV S Hayes: 12.8 cm/sec     ______________________________________________________________________________  Report approved by: Devin Gutierrez 05/08/2023 12:37 PM

## 2023-05-09 LAB
ATRIAL RATE - MUSE: 340 BPM
DIASTOLIC BLOOD PRESSURE - MUSE: NORMAL MMHG
INTERPRETATION ECG - MUSE: NORMAL
P AXIS - MUSE: NORMAL DEGREES
PR INTERVAL - MUSE: NORMAL MS
QRS DURATION - MUSE: 102 MS
QT - MUSE: 388 MS
QTC - MUSE: 487 MS
R AXIS - MUSE: 65 DEGREES
SYSTOLIC BLOOD PRESSURE - MUSE: NORMAL MMHG
T AXIS - MUSE: 54 DEGREES
VENTRICULAR RATE- MUSE: 95 BPM

## 2023-05-10 ENCOUNTER — TELEPHONE (OUTPATIENT)
Dept: CARDIOLOGY | Facility: CLINIC | Age: 63
End: 2023-05-10
Payer: COMMERCIAL

## 2023-05-10 NOTE — TELEPHONE ENCOUNTER
"Patient was admitted to [facility] on 5/6/23 with complaint of palpitations, lightheadedness, and tingling in his hands and fingers and found to be in symptomatic persistent AF of unknown duration. IV Amiodarone given which was changed to Diltiazem gtt and transitioned to po. Xarelto started.    PMH: hypertension, depression, history of alcohol abuse in the past, now been sober for more than 5 years, history of paroxysmal atrial fibrillation, status post DC cardioversion on 01/11/2021 and since then, has been in normal sinus rhythm.    5/8/23: Echo showed EF of 50-55% (EF of 35% 1/2021). The right ventricle is normal in structure, function and size. No valve disease. The ascending aorta is mildly dilated 4.0 cm.    EP plan of care: \"Add Diltiazem to current BB. Reassess in a month. If no sx's one could argue for rate control. If not proceed with cardioversion.\"     Pt was started on Xarelto and Diltiazem. PTA Metoprolol dosage was increased at time of discharge.    Called patient to discuss any post hospital d/c questions he may have, review medication changes, and confirm f/u appts. Patient denied any questions regarding new medications or changes to PTA medications.     Patient denied any SOB, chest pain, or light headedness.    RN confirmed with patient that he is scheduled for an OV on 6/6/23 at 1450 with DELORES Bibi Ekman at our [place] Office. LYLE Talavera RN Team phone number provided.    Patient advised to call clinic with any cardiac related questions or concerns prior to this delores't. Patient verbalized understanding and agreed with plan. MEREDITH George RN.          "

## 2023-06-06 ENCOUNTER — OFFICE VISIT (OUTPATIENT)
Dept: CARDIOLOGY | Facility: CLINIC | Age: 63
End: 2023-06-06
Attending: INTERNAL MEDICINE
Payer: COMMERCIAL

## 2023-06-06 VITALS
HEART RATE: 100 BPM | OXYGEN SATURATION: 99 % | DIASTOLIC BLOOD PRESSURE: 91 MMHG | SYSTOLIC BLOOD PRESSURE: 176 MMHG | BODY MASS INDEX: 30.92 KG/M2 | WEIGHT: 216 LBS | HEIGHT: 70 IN

## 2023-06-06 DIAGNOSIS — I48.11 LONGSTANDING PERSISTENT ATRIAL FIBRILLATION (H): Primary | ICD-10-CM

## 2023-06-06 DIAGNOSIS — I48.0 PAROXYSMAL ATRIAL FIBRILLATION (H): ICD-10-CM

## 2023-06-06 PROCEDURE — 93000 ELECTROCARDIOGRAM COMPLETE: CPT | Performed by: NURSE PRACTITIONER

## 2023-06-06 PROCEDURE — 99214 OFFICE O/P EST MOD 30 MIN: CPT | Performed by: NURSE PRACTITIONER

## 2023-06-06 RX ORDER — DILTIAZEM HYDROCHLORIDE 240 MG/1
240 CAPSULE, COATED, EXTENDED RELEASE ORAL DAILY
Qty: 90 CAPSULE | Refills: 3 | Status: SHIPPED | OUTPATIENT
Start: 2023-06-06 | End: 2024-08-07

## 2023-06-06 NOTE — PATIENT INSTRUCTIONS
Today's Recommendations    STOP Xarelto  Increase diltiazem to 240 mg daily  Please obtain a BP cuff and check BP 1-2 hours after medications  Continue all other medications without changes.  Please follow up with Bibi in 3 months.    Please send a SiSaf message or call [phone] to the RN team with questions or concerns.     Scheduling number [phone]    PIPE Pearson, CNP

## 2023-06-06 NOTE — PROGRESS NOTES
Electrophysiology Clinic Progress Note  Rashawn Short MRN# [id number]   YOB: 1960 Age: 63 year old     Primary cardiologist: Dr. Hercules    Reason for visit: Discharge follow up    History of presenting illness:    Rashawn Short, a pleasant 63 year old patient who has a possible history significant for:    1. Recurrent symptomatic persistent atrial fibrillation  2. [id number] score 1 (hypertension)  3. Hypertension  4. Prior alcoholism   5. Mild KATIE     He was previously evaluated by Dr. Hercules for atrial fibrillation in the setting of EtOH abuse. He then underwent a FREDI guided cardioversion after starting on amiodarone in addition to metoprolol for rate control.  Unfortunately he did return to atrial fibrillation and therefore rhythm control was abandoned and amiodarone and anticoagulation were abandoned.      Outpatient sleep study noted mild obstructive sleep apnea was recommended a home CPAP versus oral appliance versus weight management and optimizing sleep hygiene    The patient was recently admitted to [facility] on 5/6/2023.  He was evaluated by cardiology with palpitations and ECG confirmed atrial fibrillation with RVR at rates between 130-150 bpm.  He was given an IV amiodarone bolus and infusion was commenced also, IV diltiazem was started.  His prior to admission metoprolol was increased to 100 mg twice daily and he was started on Xarelto 20 mg daily.    Dr. Shelton saw the patient in consultation the patient was symptomatically improved with rate control.  Oral diltiazem was added to metoprolol XL and was recommended that he return in 1 month to discuss rhythm control versus rate control possible cardioversion.    Today the patient returns for a discharge follow up. He continues to be in atrial fibrillation with CVR.  Remains asymptomatic with rhythm control strategy.  We discussed proceeding with possible cardioversion versus ongoing rate control.  After shared decision  making it was elected to proceed with rate control strategy given the asymptomatic nature and controlled rates.  Blood pressure was elevated throughout the visit after multiple checks.    Diagnostic studies:    EKG (6/6/2023): Atrial fibrillation at 94 bpm    Echocardiogram (5/2023): LVEF 50 to 55% with normal RV structure and function and size.  No significant valvular disease.  Mildly elevated ascending aorta at 4.0 cm and mildly bilateral atria.    FREDI (1/2021): LVEF of 35 to 40% with moderate global hypokinesia of the LV.  RV was normal size and function.  LA was moderately dilated and RA was mildly dilated.  No significant valvular disease.    Echocardiogram (2020): LVEF 55% with borderline global hypokinesis of the LV and mild bilateral atrial enlargement.  Mild dilatation of the ascending aorta         Assessment and Plan:     ASSESSMENT:    1. Recurrent symptomatic persistent atrial fibrillation     [id number] score 1 (hypertension)    Status post FREDI guided cardioversion in 1/2021 and return to atrial fibrillation    Recent admission for atrial fibrillation with RVR and was place p.o. diltiazem and prior to admission metoprolol was increased and started on Xarelto    2. Nonsustained VT    14 runs of VT noted on Zio patch and patient was asymptomatic    Nuclear stress test noted small area of ischemia in the mid distal anterior segments and the distal inferior segments of the LV    3. Hypertension    Elevated    PTA hydrochlorothiazide (NAT) and amlodipine (changed to diltiazem) were stopped during admisison    PLAN:     1. Given will remain with rate control strategy and patient's [id number] score is 1 Xarelto will be discontinued  2. Increase diltiazem to 240 mg daily for ongoing rate control and hypertension management  3. Recommend checking home blood pressure 1 to 2 hours after medications and if blood pressure remains elevated will uptitrate lisinopril  4. Return to clinic in follow-up in 3 months  with Dr. Hercules or myself     Orders this Visit:  Orders Placed This Encounter   Procedures     Follow-Up with Cardiology CHRISTY     EKG 12-lead complete w/read - Clinics (performed today)     Orders Placed This Encounter   Medications     diltiazem ER COATED BEADS (CARDIZEM CD/CARTIA XT) 240 MG 24 hr capsule     Sig: Take 1 capsule (240 mg) by mouth daily     Dispense:  90 capsule     Refill:  3     Medications Discontinued During This Encounter   Medication Reason     rivaroxaban ANTICOAGULANT (XARELTO) 20 MG TABS tablet      diltiazem ER COATED BEADS (CARDIZEM CD/CARTIA XT) 120 MG 24 hr capsule      Orders this Visit:  Orders Placed This Encounter   Procedures     Follow-Up with Cardiology CHRISTY     EKG 12-lead complete w/read - Clinics (performed today)     Orders Placed This Encounter   Medications     diltiazem ER COATED BEADS (CARDIZEM CD/CARTIA XT) 240 MG 24 hr capsule     Sig: Take 1 capsule (240 mg) by mouth daily     Dispense:  90 capsule     Refill:  3     Medications Discontinued During This Encounter   Medication Reason     rivaroxaban ANTICOAGULANT (XARELTO) 20 MG TABS tablet      diltiazem ER COATED BEADS (CARDIZEM CD/CARTIA XT) 120 MG 24 hr capsule        Today's clinic visit entailed:  Review of the result(s) of each unique test - EKG, Echo, Zio, Stres  Prescription drug management  40 minutes spent by me on the date of the encounter doing chart review, history and exam, documentation and further activities per the note  Provider  Link to [facility] Help Grid     The level of medical decision making during this visit was of moderate complexity.           Review of Systems:     Review of Systems:  Skin:        Eyes:       ENT:       Respiratory:  Negative    Cardiovascular:  Negative for;palpitations;chest pain;edema;lightheadedness;dizziness;fatigue;syncope or near-syncope    Gastroenterology:      Genitourinary:       Musculoskeletal:       Neurologic:       Psychiatric:       Heme/Lymph/Imm:      "  Endocrine:  Negative              Physical Exam:     Vitals: BP (!) 176/91   Pulse 100   Ht 1.778 m (5' 10\")   Wt 98 kg (216 lb)   SpO2 99%   BMI 30.99 kg/m    Constitutional: Well nourished and in no apparent distress.  Eyes: Pupils equal, round. Sclerae anicteric.   HEENT: Normocephalic, atraumatic.   Neck: Supple  Respiratory: Breathing non-labored. Lungs clear to auscultation bilaterally. No crackles, wheezes, rhonchi, or rales.  Cardiovascular: IRR rate and rhythm, normal S1 and S2. No murmur, rub, or gallop.  Skin: Warm, dry. No rashes, cyanosis, or xanthelasma.  Extremities: No edema.  Neurologic: No gross motor deficits. Alert, awake, and oriented to person, place and time.  Psychiatric: Affect appropriate.        CURRENT MEDICATIONS:  Current Outpatient Medications   Medication Sig Dispense Refill     cloNIDine (CATAPRES) 0.1 MG tablet Take 0.1 mg by mouth every morning       diltiazem ER COATED BEADS (CARDIZEM CD/CARTIA XT) 240 MG 24 hr capsule Take 1 capsule (240 mg) by mouth daily 90 capsule 3     fish oil-omega-3 fatty acids 1000 MG capsule Take 1 g by mouth daily       lisinopril (ZESTRIL) 10 MG tablet Take 1 tablet (10 mg) by mouth daily 90 tablet 3     metoprolol succinate ER (TOPROL XL) 100 MG 24 hr tablet Take 1 tablet (100 mg) by mouth 2 times daily 60 tablet 3     multivitamin, therapeutic (THERA-VIT) TABS tablet Take 1 tablet by mouth daily       rosuvastatin (CRESTOR) 20 MG tablet Take 1 tablet (20 mg) by mouth daily 90 tablet 3     thiamine (B-1) 100 MG tablet Take 100 mg by mouth daily       hypromellose (ARTIFICIAL TEARS) 0.5 % SOLN ophthalmic solution Place 1 drop into both eyes every hour as needed for dry eyes (Patient not taking: Reported on 6/6/2023)         ALLERGIES  No Known Allergies      PAST MEDICAL HISTORY:  Past Medical History:   Diagnosis Date     Atrial fibrillation with RVR (H) 12/28/2020     Depressive disorder      EtOH dependence (H)      Hypertension      " Paroxysmal atrial fibrillation (H) 01/29/2010       PAST SURGICAL HISTORY:  Past Surgical History:   Procedure Laterality Date     ANESTHESIA CARDIOVERSION N/A 1/11/2021    Procedure: ANESTHESIA, FOR CARDIOVERSION;  Surgeon: GENERIC ANESTHESIA PROVIDER;  Location:  OR     ORTHOPEDIC SURGERY  1998    ACL Right knee reconstruction-        FAMILY HISTORY:  Family History   Problem Relation Age of Onset     Arrhythmia Father         a-fib     Colon Polyps Sister      C.A.D. No family hx of      Diabetes No family hx of      Hypertension No family hx of      Cerebrovascular Disease No family hx of      Breast Cancer No family hx of      Cancer - colorectal No family hx of      Prostate Cancer No family hx of        SOCIAL HISTORY:  Social History     Socioeconomic History     Marital status:      Spouse name: None     Number of children: None     Years of education: None     Highest education level: None   Tobacco Use     Smoking status: Never     Smokeless tobacco: Never   Substance and Sexual Activity     Alcohol use: Not Currently     Comment: no alcolhol 2017     Drug use: No     Sexual activity: Yes     Partners: Female

## 2023-06-06 NOTE — LETTER
6/6/2023    Bárbara Young MD  [address]  [facility] [address]    RE: Rashawn Short       Dear Colleague,     I had the pleasure of seeing Rashawn Short in the [facility].    Electrophysiology Clinic Progress Note  Rashawn Short MRN# [id number]   YOB: 1960 Age: 63 year old     Primary cardiologist: Dr. Hercules    Reason for visit: Discharge follow up    History of presenting illness:    Rashawn Short, a pleasant 63 year old patient who has a possible history significant for:    Recurrent symptomatic persistent atrial fibrillation  [id number] score 1 (hypertension)  Hypertension  Prior alcoholism   Mild KATIE     He was previously evaluated by Dr. Hercules for atrial fibrillation in the setting of EtOH abuse. He then underwent a FREDI guided cardioversion after starting on amiodarone in addition to metoprolol for rate control.  Unfortunately he did return to atrial fibrillation and therefore rhythm control was abandoned and amiodarone and anticoagulation were abandoned.      Outpatient sleep study noted mild obstructive sleep apnea was recommended a home CPAP versus oral appliance versus weight management and optimizing sleep hygiene    The patient was recently admitted to [facility] on 5/6/2023.  He was evaluated by cardiology with palpitations and ECG confirmed atrial fibrillation with RVR at rates between 130-150 bpm.  He was given an IV amiodarone bolus and infusion was commenced also, IV diltiazem was started.  His prior to admission metoprolol was increased to 100 mg twice daily and he was started on Xarelto 20 mg daily.    Dr. Shelton saw the patient in consultation the patient was symptomatically improved with rate control.  Oral diltiazem was added to metoprolol XL and was recommended that he return in 1 month to discuss rhythm control versus rate control possible cardioversion.    Today the patient returns for a discharge follow up. He continues to be  in atrial fibrillation with CVR.  Remains asymptomatic with rhythm control strategy.  We discussed proceeding with possible cardioversion versus ongoing rate control.  After shared decision making it was elected to proceed with rate control strategy given the asymptomatic nature and controlled rates.  Blood pressure was elevated throughout the visit after multiple checks.    Diagnostic studies:  EKG (6/6/2023): Atrial fibrillation at 94 bpm  Echocardiogram (5/2023): LVEF 50 to 55% with normal RV structure and function and size.  No significant valvular disease.  Mildly elevated ascending aorta at 4.0 cm and mildly bilateral atria.  FREDI (1/2021): LVEF of 35 to 40% with moderate global hypokinesia of the LV.  RV was normal size and function.  LA was moderately dilated and RA was mildly dilated.  No significant valvular disease.  Echocardiogram (2020): LVEF 55% with borderline global hypokinesis of the LV and mild bilateral atrial enlargement.  Mild dilatation of the ascending aorta         Assessment and Plan:     ASSESSMENT:    Recurrent symptomatic persistent atrial fibrillation   [id number] score 1 (hypertension)  Status post FREDI guided cardioversion in 1/2021 and return to atrial fibrillation  Recent admission for atrial fibrillation with RVR and was place p.o. diltiazem and prior to admission metoprolol was increased and started on Xarelto    Nonsustained VT  14 runs of VT noted on Zio patch and patient was asymptomatic  Nuclear stress test noted small area of ischemia in the mid distal anterior segments and the distal inferior segments of the LV    Hypertension  Elevated  PTA hydrochlorothiazide (NAT) and amlodipine (changed to diltiazem) were stopped during admisison    PLAN:     Given will remain with rate control strategy and patient's [id number] score is 1 Xarelto will be discontinued  Increase diltiazem to 240 mg daily for ongoing rate control and hypertension management  Recommend checking home blood  pressure 1 to 2 hours after medications and if blood pressure remains elevated will uptitrate lisinopril  Return to clinic in follow-up in 3 months with Dr. Hercules or myself     Orders this Visit:  Orders Placed This Encounter   Procedures    Follow-Up with Cardiology CHRISTY    EKG 12-lead complete w/read - Clinics (performed today)     Orders Placed This Encounter   Medications    diltiazem ER COATED BEADS (CARDIZEM CD/CARTIA XT) 240 MG 24 hr capsule     Sig: Take 1 capsule (240 mg) by mouth daily     Dispense:  90 capsule     Refill:  3     Medications Discontinued During This Encounter   Medication Reason    rivaroxaban ANTICOAGULANT (XARELTO) 20 MG TABS tablet     diltiazem ER COATED BEADS (CARDIZEM CD/CARTIA XT) 120 MG 24 hr capsule      Orders this Visit:  Orders Placed This Encounter   Procedures    Follow-Up with Cardiology CHRISTY    EKG 12-lead complete w/read - Clinics (performed today)     Orders Placed This Encounter   Medications    diltiazem ER COATED BEADS (CARDIZEM CD/CARTIA XT) 240 MG 24 hr capsule     Sig: Take 1 capsule (240 mg) by mouth daily     Dispense:  90 capsule     Refill:  3     Medications Discontinued During This Encounter   Medication Reason    rivaroxaban ANTICOAGULANT (XARELTO) 20 MG TABS tablet     diltiazem ER COATED BEADS (CARDIZEM CD/CARTIA XT) 120 MG 24 hr capsule        Today's clinic visit entailed:  Review of the result(s) of each unique test - EKG, Echo, Zio, Stres  Prescription drug management  40 minutes spent by me on the date of the encounter doing chart review, history and exam, documentation and further activities per the note  Provider  Link to MDM Help Grid     The level of medical decision making during this visit was of moderate complexity.           Review of Systems:     Review of Systems:  Skin:        Eyes:       ENT:       Respiratory:  Negative    Cardiovascular:  Negative for;palpitations;chest pain;edema;lightheadedness;dizziness;fatigue;syncope or near-syncope   "  Gastroenterology:      Genitourinary:       Musculoskeletal:       Neurologic:       Psychiatric:       Heme/Lymph/Imm:       Endocrine:  Negative              Physical Exam:     Vitals: BP (!) 176/91   Pulse 100   Ht 1.778 m (5' 10\")   Wt 98 kg (216 lb)   SpO2 99%   BMI 30.99 kg/m    Constitutional: Well nourished and in no apparent distress.  Eyes: Pupils equal, round. Sclerae anicteric.   HEENT: Normocephalic, atraumatic.   Neck: Supple  Respiratory: Breathing non-labored. Lungs clear to auscultation bilaterally. No crackles, wheezes, rhonchi, or rales.  Cardiovascular: IRR rate and rhythm, normal S1 and S2. No murmur, rub, or gallop.  Skin: Warm, dry. No rashes, cyanosis, or xanthelasma.  Extremities: No edema.  Neurologic: No gross motor deficits. Alert, awake, and oriented to person, place and time.  Psychiatric: Affect appropriate.        CURRENT MEDICATIONS:  Current Outpatient Medications   Medication Sig Dispense Refill    cloNIDine (CATAPRES) 0.1 MG tablet Take 0.1 mg by mouth every morning      diltiazem ER COATED BEADS (CARDIZEM CD/CARTIA XT) 240 MG 24 hr capsule Take 1 capsule (240 mg) by mouth daily 90 capsule 3    fish oil-omega-3 fatty acids 1000 MG capsule Take 1 g by mouth daily      lisinopril (ZESTRIL) 10 MG tablet Take 1 tablet (10 mg) by mouth daily 90 tablet 3    metoprolol succinate ER (TOPROL XL) 100 MG 24 hr tablet Take 1 tablet (100 mg) by mouth 2 times daily 60 tablet 3    multivitamin, therapeutic (THERA-VIT) TABS tablet Take 1 tablet by mouth daily      rosuvastatin (CRESTOR) 20 MG tablet Take 1 tablet (20 mg) by mouth daily 90 tablet 3    thiamine (B-1) 100 MG tablet Take 100 mg by mouth daily      hypromellose (ARTIFICIAL TEARS) 0.5 % SOLN ophthalmic solution Place 1 drop into both eyes every hour as needed for dry eyes (Patient not taking: Reported on 6/6/2023)         ALLERGIES  No Known Allergies      PAST MEDICAL HISTORY:  Past Medical History:   Diagnosis Date    Atrial " fibrillation with RVR (H) 12/28/2020    Depressive disorder     EtOH dependence (H)     Hypertension     Paroxysmal atrial fibrillation (H) 01/29/2010       PAST SURGICAL HISTORY:  Past Surgical History:   Procedure Laterality Date    ANESTHESIA CARDIOVERSION N/A 1/11/2021    Procedure: ANESTHESIA, FOR CARDIOVERSION;  Surgeon: GENERIC ANESTHESIA PROVIDER;  Location: [facility] OR    ORTHOPEDIC SURGERY  1998    ACL Right knee reconstruction-        FAMILY HISTORY:  Family History   Problem Relation Age of Onset    Arrhythmia Father         a-fib    Colon Polyps Sister     C.A.D. No family hx of     Diabetes No family hx of     Hypertension No family hx of     Cerebrovascular Disease No family hx of     Breast Cancer No family hx of     Cancer - colorectal No family hx of     Prostate Cancer No family hx of        SOCIAL HISTORY:  Social History     Socioeconomic History    Marital status:      Spouse name: None    Number of children: None    Years of education: None    Highest education level: None   Tobacco Use    Smoking status: Never    Smokeless tobacco: Never   Substance and Sexual Activity    Alcohol use: Not Currently     Comment: no alcolhol 2017    Drug use: No    Sexual activity: Yes     Partners: Female              Thank you for allowing me to participate in the care of your patient.      Sincerely,     [facility]  cc:   Micaela Ivan MD  [address] [place] [id number]

## 2023-09-11 ENCOUNTER — OFFICE VISIT (OUTPATIENT)
Dept: CARDIOLOGY | Facility: CLINIC | Age: 63
End: 2023-09-11
Payer: COMMERCIAL

## 2023-09-11 VITALS
HEART RATE: 80 BPM | OXYGEN SATURATION: 100 % | SYSTOLIC BLOOD PRESSURE: 164 MMHG | WEIGHT: 215 LBS | DIASTOLIC BLOOD PRESSURE: 112 MMHG | BODY MASS INDEX: 30.78 KG/M2 | HEIGHT: 70 IN

## 2023-09-11 DIAGNOSIS — E78.5 HYPERLIPIDEMIA LDL GOAL <130: ICD-10-CM

## 2023-09-11 DIAGNOSIS — I48.0 PAROXYSMAL ATRIAL FIBRILLATION (H): ICD-10-CM

## 2023-09-11 DIAGNOSIS — I10 HYPERTENSION GOAL BP (BLOOD PRESSURE) < 140/90: ICD-10-CM

## 2023-09-11 DIAGNOSIS — R94.39 ABNORMAL STRESS TEST: ICD-10-CM

## 2023-09-11 DIAGNOSIS — I48.91 ATRIAL FIBRILLATION WITH RVR (H): ICD-10-CM

## 2023-09-11 PROCEDURE — 99214 OFFICE O/P EST MOD 30 MIN: CPT | Performed by: INTERNAL MEDICINE

## 2023-09-11 NOTE — LETTER
9/11/2023    Bárbara Young MD  [address]  [facility] [address]    RE: Rashawn Short       Dear Colleague,     I had the pleasure of seeing Rashawn Short in the [facility].  HPI and Plan:   Today I had the pleasure of seeing Rashawn Short at [facility]. He is a pleasant 62 year old patient with a past medical history of paroxysmal atrial fibrillation, hypertension, prior alcoholism [sober 5 years] and depression who presents to the clinic for a follow-up visit.  He is an extremely sweet gentleman.    Today, the patient tells me that he has been feeling extremely well.  He is extremely kind and thanks me repeatedly for taking care of him.   He also reports trying to eat healthy and continues to lose weight and goes to the gym 5 times a week.  H he also swims regularly. I congratulated him on his efforts.  He will be losing his hospital for recurrence of atrial fibrillation and underwent FREDI guided cardioversion.  He was briefly on amiodarone as well as Xarelto but is currently off of them.  He does not have any major complaints today.    Diagnostic studies:  EKG (6/6/2023): Atrial fibrillation at 94 bpm  Echocardiogram (5/2023): LVEF 50 to 55% with normal RV structure and function and size.  No significant valvular disease.  Mildly elevated ascending aorta at 4.0 cm and mildly bilateral atria.  FREDI (1/2021): LVEF of 35 to 40% with moderate global hypokinesia of the LV.  RV was normal size and function.  LA was moderately dilated and RA was mildly dilated.  No significant valvular disease.  Echocardiogram (2020): LVEF 55% with borderline global hypokinesis of the LV and mild bilateral atrial enlargement.  Mild dilatation of the ascending aorta    Assessment and plan:   1.  Atrial fibrillation-chads 2 vascular score of 1 for hypertension.  Previously opted for rate control therapy  2.  Essential hypertension-controlled  3.  Alcoholism-sober for last 5 years  4.   Bed: 50PED  Expected date:   Expected time:   Means of arrival:   Comments:     Colt Mckenna RN  05/07/21 8800 Obesity-undertaking lifestyle modification  5.  Mild sleep apnea  6.  Abnormal Lexiscan followed by near normal CT calcium scan  7.  Hyperlipidemia-on Crestor  8.  Mild cardiomyopathy-ejection fraction 50-55%, improved from before    It was an absolute Pleasure to See Mr Short in clinic today.  He is doing very well from cardiac standpoint and does not have any major complaints today.  He is living a very healthy lifestyle and I have urged him to continue with his efforts.      From A-fib standpoint, it has previously been decided to pursue rate control therapy.  For this reason and because he failed amiodarone it was discontinued.  He is on metoprolol and diltiazem, both of which I will continue because of low [id number] Titus blood work to start him on anticoagulation.    His blood pressure remains very poorly controlled and for this reason I am going to double the dose of lisinopril.  I doubt if this is going to be enough to control his blood pressures fully and so I have asked him to get a blood pressure cuff and start taking his blood pressures every morning and get back to us in a week with the numbers.  I am expecting that the numbers will be high and we will have to double the dose of lisinopril.  I have also urged him to take his newly but blood pressure cuff to clinic during his next visit and have it checked against the clinic equipment.    Thank you for allowing me to participate in the care of Rashawn Short    This note was completed in part using Dragon voice recognition software. Although reviewed after completion, some word and grammatical errors may occur.    Austin Hercules MD  Cardiology    No orders of the defined types were placed in this encounter.      No orders of the defined types were placed in this encounter.      There are no discontinued medications.      Encounter Diagnoses   Name Primary?    Paroxysmal atrial fibrillation (H)     Atrial fibrillation with RVR (H)     Hyperlipidemia LDL  goal <130     Abnormal stress test     Hypertension goal BP (blood pressure) < 140/90        CURRENT MEDICATIONS:  Current Outpatient Medications   Medication Sig Dispense Refill    cloNIDine (CATAPRES) 0.1 MG tablet Take 0.1 mg by mouth every morning      diltiazem ER COATED BEADS (CARDIZEM CD/CARTIA XT) 240 MG 24 hr capsule Take 1 capsule (240 mg) by mouth daily 90 capsule 3    fish oil-omega-3 fatty acids 1000 MG capsule Take 1 g by mouth daily      lisinopril (ZESTRIL) 10 MG tablet Take 1 tablet (10 mg) by mouth daily 90 tablet 3    metoprolol succinate ER (TOPROL XL) 100 MG 24 hr tablet Take 1 tablet (100 mg) by mouth 2 times daily 60 tablet 3    multivitamin, therapeutic (THERA-VIT) TABS tablet Take 1 tablet by mouth daily      rosuvastatin (CRESTOR) 20 MG tablet Take 1 tablet (20 mg) by mouth daily 90 tablet 3    thiamine (B-1) 100 MG tablet Take 100 mg by mouth daily      hypromellose (ARTIFICIAL TEARS) 0.5 % SOLN ophthalmic solution Place 1 drop into both eyes every hour as needed for dry eyes (Patient not taking: Reported on 6/6/2023)         ALLERGIES   No Known Allergies    PAST MEDICAL HISTORY:  Past Medical History:   Diagnosis Date    Atrial fibrillation with RVR (H) 12/28/2020    Depressive disorder     EtOH dependence (H)     Hypertension     Paroxysmal atrial fibrillation (H) 01/29/2010       PAST SURGICAL HISTORY:  Past Surgical History:   Procedure Laterality Date    ANESTHESIA CARDIOVERSION N/A 1/11/2021    Procedure: ANESTHESIA, FOR CARDIOVERSION;  Surgeon: GENERIC ANESTHESIA PROVIDER;  Location:  OR    ORTHOPEDIC SURGERY  1998    ACL Right knee reconstruction-        FAMILY HISTORY:  Family History   Problem Relation Age of Onset    Hypertension Father     Arrhythmia Father         a-fib    Colon Polyps Sister     C.A.D. No family hx of     Diabetes No family hx of     Cerebrovascular Disease No family hx of     Breast Cancer No family hx of     Cancer - colorectal No family hx of      "Prostate Cancer No family hx of        SOCIAL HISTORY:  Social History     Socioeconomic History    Marital status:      Spouse name: None    Number of children: None    Years of education: None    Highest education level: None   Tobacco Use    Smoking status: Never    Smokeless tobacco: Never   Substance and Sexual Activity    Alcohol use: Not Currently     Comment: no alcolhol 2017    Drug use: No    Sexual activity: Yes     Partners: Female       Review of Systems:  Skin:          Eyes:         ENT:         Respiratory:  Negative       Cardiovascular:  Negative for;palpitations;chest pain;edema;lightheadedness;dizziness;fatigue;syncope or near-syncope      Gastroenterology:        Genitourinary:         Musculoskeletal:         Neurologic:         Psychiatric:         Heme/Lymph/Imm:         Endocrine:  Negative        Physical Exam:  Vitals: BP (!) 164/112   Pulse 80   Ht 1.778 m (5' 10\")   Wt 97.5 kg (215 lb)   SpO2 100%   BMI 30.85 kg/m    Eyes: No icterus.  Pulmonary: Chest symmetric, lungs clear bilaterally and no crackles, wheezes or rales.  Cardiovascular: RRR with normal S1 and S2, no murmur, JVP normal.  Musculoskeletal: Edema of the lower extremities: None.  Neurologic: Oriented and appropriate without obvious focal deficits.   Psychiatric: Normal affect.     Recent Lab Results:  LIPID RESULTS:  Lab Results   Component Value Date    CHOL 132 02/03/2023    CHOL 208 (H) 03/02/2005    HDL 44 02/03/2023    HDL 61 03/02/2005    LDL 66 02/03/2023     03/02/2005    TRIG 111 02/03/2023    TRIG 158 (H) 09/13/2021    TRIG 112 03/02/2005    CHOLHDLRATIO 3.4 03/02/2005       LIVER ENZYME RESULTS:  Lab Results   Component Value Date    AST 50 (H) 12/28/2020    ALT 21 02/03/2023    ALT 98 (H) 12/28/2020       CBC RESULTS:  Lab Results   Component Value Date    WBC 10.8 05/07/2023    WBC 8.2 12/29/2020    RBC 5.14 05/07/2023    RBC 5.60 12/29/2020    HGB 15.6 05/07/2023    HGB 16.5 12/29/2020    HCT " 45.1 05/07/2023    HCT 49.8 12/29/2020    MCV 88 05/07/2023    MCV 89 12/29/2020    MCH 30.4 05/07/2023    MCH 29.5 12/29/2020    MCHC 34.6 05/07/2023    MCHC 33.1 12/29/2020    RDW 13.1 05/07/2023    RDW 13.7 12/29/2020     05/07/2023     12/29/2020       BMP RESULTS:  Lab Results   Component Value Date     05/08/2023     12/29/2020    POTASSIUM 3.7 05/08/2023    POTASSIUM 4.0 12/20/2021    POTASSIUM 3.7 01/11/2021    CHLORIDE 99 05/07/2023    CHLORIDE 103 12/20/2021    CHLORIDE 100 09/13/2021    CHLORIDE 108 12/29/2020    CO2 20 (L) 05/07/2023    CO2 29 12/20/2021    CO2 26 12/29/2020    ANIONGAP 15 05/07/2023    ANIONGAP 4 12/20/2021    ANIONGAP 3 12/29/2020     (H) 05/07/2023    GLC 81 12/20/2021    GLC 86 12/29/2020    BUN 18.0 05/07/2023    BUN 16 12/20/2021    BUN 15 12/29/2020    CR 1.00 05/08/2023    CR 0.98 12/29/2020    GFRESTIMATED 85 05/08/2023    GFRESTIMATED 82 12/29/2020    GFRESTBLACK >90 12/29/2020    KELSEY 8.2 (L) 05/07/2023    KELSEY 8.6 12/29/2020        A1C RESULTS:  No results found for: A1C    INR RESULTS:  Lab Results   Component Value Date    INR 1.08 05/06/2023    INR 1.74 (H) 01/11/2021    INR 0.98 12/28/2020       CC  Austin Hercules MD  [address]  [place] [address]    All medical records were reviewed in detail and discussed with the patient. Greater than 30 mins were spent with the patient, 50% of this time was spent on counseling and coordination of care.  After visit summary was printed and given to the patient.  Thank you for allowing me to participate in the care of your patient.      Sincerely,     Austin Hercules MD   [facility]

## 2023-09-11 NOTE — PROGRESS NOTES
HPI and Plan:   Today I had the pleasure of seeing Rashawn Short at [facility]. He is a pleasant 62 year old patient with a past medical history of paroxysmal atrial fibrillation, hypertension, prior alcoholism [sober 5 years] and depression who presents to the clinic for a follow-up visit.  He is an extremely sweet gentleman.    Today, the patient tells me that he has been feeling extremely well.  He is extremely kind and thanks me repeatedly for taking care of him.   He also reports trying to eat healthy and continues to lose weight and goes to the gym 5 times a week.  H he also swims regularly. I congratulated him on his efforts.  He will be losing his hospital for recurrence of atrial fibrillation and underwent FREDI guided cardioversion.  He was briefly on amiodarone as well as Xarelto but is currently off of them.  He does not have any major complaints today.    Diagnostic studies:  EKG (6/6/2023): Atrial fibrillation at 94 bpm  Echocardiogram (5/2023): LVEF 50 to 55% with normal RV structure and function and size.  No significant valvular disease.  Mildly elevated ascending aorta at 4.0 cm and mildly bilateral atria.  FREDI (1/2021): LVEF of 35 to 40% with moderate global hypokinesia of the LV.  RV was normal size and function.  LA was moderately dilated and RA was mildly dilated.  No significant valvular disease.  Echocardiogram (2020): LVEF 55% with borderline global hypokinesis of the LV and mild bilateral atrial enlargement.  Mild dilatation of the ascending aorta    Assessment and plan:   1.  Atrial fibrillation-chads 2 vascular score of 1 for hypertension.  Previously opted for rate control therapy  2.  Essential hypertension-controlled  3.  Alcoholism-sober for last 5 years  4.  Obesity-undertaking lifestyle modification  5.  Mild sleep apnea  6.  Abnormal Lexiscan followed by near normal CT calcium scan  7.  Hyperlipidemia-on Crestor  8.  Mild cardiomyopathy-ejection fraction  50-55%, improved from before    It was an absolute Pleasure to See Mr Short in clinic today.  He is doing very well from cardiac standpoint and does not have any major complaints today.  He is living a very healthy lifestyle and I have urged him to continue with his efforts.      From A-fib standpoint, it has previously been decided to pursue rate control therapy.  For this reason and because he failed amiodarone it was discontinued.  He is on metoprolol and diltiazem, both of which I will continue because of low [id number] Titus blood work to start him on anticoagulation.    His blood pressure remains very poorly controlled and for this reason I am going to double the dose of lisinopril.  I doubt if this is going to be enough to control his blood pressures fully and so I have asked him to get a blood pressure cuff and start taking his blood pressures every morning and get back to us in a week with the numbers.  I am expecting that the numbers will be high and we will have to double the dose of lisinopril.  I have also urged him to take his newly but blood pressure cuff to clinic during his next visit and have it checked against the clinic equipment.    Thank you for allowing me to participate in the care of Rashawn Short    This note was completed in part using Dragon voice recognition software. Although reviewed after completion, some word and grammatical errors may occur.    Austin Hercules MD  Cardiology    No orders of the defined types were placed in this encounter.      No orders of the defined types were placed in this encounter.      There are no discontinued medications.      Encounter Diagnoses   Name Primary?    Paroxysmal atrial fibrillation (H)     Atrial fibrillation with RVR (H)     Hyperlipidemia LDL goal <130     Abnormal stress test     Hypertension goal BP (blood pressure) < 140/90        CURRENT MEDICATIONS:  Current Outpatient Medications   Medication Sig Dispense Refill    cloNIDine  (CATAPRES) 0.1 MG tablet Take 0.1 mg by mouth every morning      diltiazem ER COATED BEADS (CARDIZEM CD/CARTIA XT) 240 MG 24 hr capsule Take 1 capsule (240 mg) by mouth daily 90 capsule 3    fish oil-omega-3 fatty acids 1000 MG capsule Take 1 g by mouth daily      lisinopril (ZESTRIL) 10 MG tablet Take 1 tablet (10 mg) by mouth daily 90 tablet 3    metoprolol succinate ER (TOPROL XL) 100 MG 24 hr tablet Take 1 tablet (100 mg) by mouth 2 times daily 60 tablet 3    multivitamin, therapeutic (THERA-VIT) TABS tablet Take 1 tablet by mouth daily      rosuvastatin (CRESTOR) 20 MG tablet Take 1 tablet (20 mg) by mouth daily 90 tablet 3    thiamine (B-1) 100 MG tablet Take 100 mg by mouth daily      hypromellose (ARTIFICIAL TEARS) 0.5 % SOLN ophthalmic solution Place 1 drop into both eyes every hour as needed for dry eyes (Patient not taking: Reported on 6/6/2023)         ALLERGIES   No Known Allergies    PAST MEDICAL HISTORY:  Past Medical History:   Diagnosis Date    Atrial fibrillation with RVR (H) 12/28/2020    Depressive disorder     EtOH dependence (H)     Hypertension     Paroxysmal atrial fibrillation (H) 01/29/2010       PAST SURGICAL HISTORY:  Past Surgical History:   Procedure Laterality Date    ANESTHESIA CARDIOVERSION N/A 1/11/2021    Procedure: ANESTHESIA, FOR CARDIOVERSION;  Surgeon: GENERIC ANESTHESIA PROVIDER;  Location:  OR    ORTHOPEDIC SURGERY  1998    ACL Right knee reconstruction-        FAMILY HISTORY:  Family History   Problem Relation Age of Onset    Hypertension Father     Arrhythmia Father         a-fib    Colon Polyps Sister     C.A.D. No family hx of     Diabetes No family hx of     Cerebrovascular Disease No family hx of     Breast Cancer No family hx of     Cancer - colorectal No family hx of     Prostate Cancer No family hx of        SOCIAL HISTORY:  Social History     Socioeconomic History    Marital status:      Spouse name: None    Number of children: None    Years of education:  "None    Highest education level: None   Tobacco Use    Smoking status: Never    Smokeless tobacco: Never   Substance and Sexual Activity    Alcohol use: Not Currently     Comment: no alcolhol 2017    Drug use: No    Sexual activity: Yes     Partners: Female       Review of Systems:  Skin:          Eyes:         ENT:         Respiratory:  Negative       Cardiovascular:  Negative for;palpitations;chest pain;edema;lightheadedness;dizziness;fatigue;syncope or near-syncope      Gastroenterology:        Genitourinary:         Musculoskeletal:         Neurologic:         Psychiatric:         Heme/Lymph/Imm:         Endocrine:  Negative        Physical Exam:  Vitals: BP (!) 164/112   Pulse 80   Ht 1.778 m (5' 10\")   Wt 97.5 kg (215 lb)   SpO2 100%   BMI 30.85 kg/m    Eyes: No icterus.  Pulmonary: Chest symmetric, lungs clear bilaterally and no crackles, wheezes or rales.  Cardiovascular: RRR with normal S1 and S2, no murmur, JVP normal.  Musculoskeletal: Edema of the lower extremities: None.  Neurologic: Oriented and appropriate without obvious focal deficits.   Psychiatric: Normal affect.     Recent Lab Results:  LIPID RESULTS:  Lab Results   Component Value Date    CHOL 132 02/03/2023    CHOL 208 (H) 03/02/2005    HDL 44 02/03/2023    HDL 61 03/02/2005    LDL 66 02/03/2023     03/02/2005    TRIG 111 02/03/2023    TRIG 158 (H) 09/13/2021    TRIG 112 03/02/2005    CHOLHDLRATIO 3.4 03/02/2005       LIVER ENZYME RESULTS:  Lab Results   Component Value Date    AST 50 (H) 12/28/2020    ALT 21 02/03/2023    ALT 98 (H) 12/28/2020       CBC RESULTS:  Lab Results   Component Value Date    WBC 10.8 05/07/2023    WBC 8.2 12/29/2020    RBC 5.14 05/07/2023    RBC 5.60 12/29/2020    HGB 15.6 05/07/2023    HGB 16.5 12/29/2020    HCT 45.1 05/07/2023    HCT 49.8 12/29/2020    MCV 88 05/07/2023    MCV 89 12/29/2020    MCH 30.4 05/07/2023    MCH 29.5 12/29/2020    MCHC 34.6 05/07/2023    MCHC 33.1 12/29/2020    RDW 13.1 " 05/07/2023    RDW 13.7 12/29/2020     05/07/2023     12/29/2020       BMP RESULTS:  Lab Results   Component Value Date     05/08/2023     12/29/2020    POTASSIUM 3.7 05/08/2023    POTASSIUM 4.0 12/20/2021    POTASSIUM 3.7 01/11/2021    CHLORIDE 99 05/07/2023    CHLORIDE 103 12/20/2021    CHLORIDE 100 09/13/2021    CHLORIDE 108 12/29/2020    CO2 20 (L) 05/07/2023    CO2 29 12/20/2021    CO2 26 12/29/2020    ANIONGAP 15 05/07/2023    ANIONGAP 4 12/20/2021    ANIONGAP 3 12/29/2020     (H) 05/07/2023    GLC 81 12/20/2021    GLC 86 12/29/2020    BUN 18.0 05/07/2023    BUN 16 12/20/2021    BUN 15 12/29/2020    CR 1.00 05/08/2023    CR 0.98 12/29/2020    GFRESTIMATED 85 05/08/2023    GFRESTIMATED 82 12/29/2020    GFRESTBLACK >90 12/29/2020    KELSEY 8.2 (L) 05/07/2023    KELSEY 8.6 12/29/2020        A1C RESULTS:  No results found for: A1C    INR RESULTS:  Lab Results   Component Value Date    INR 1.08 05/06/2023    INR 1.74 (H) 01/11/2021    INR 0.98 12/28/2020       CC  Austin Hercules MD  [address]  [place] [address]    All medical records were reviewed in detail and discussed with the patient. Greater than 30 mins were spent with the patient, 50% of this time was spent on counseling and coordination of care.  After visit summary was printed and given to the patient.

## 2023-09-19 ENCOUNTER — TRANSFERRED RECORDS (OUTPATIENT)
Dept: HEALTH INFORMATION MANAGEMENT | Facility: CLINIC | Age: 63
End: 2023-09-19
Payer: COMMERCIAL

## 2023-09-20 ENCOUNTER — TELEPHONE (OUTPATIENT)
Dept: CARDIOLOGY | Facility: CLINIC | Age: 63
End: 2023-09-20
Payer: COMMERCIAL

## 2023-09-20 NOTE — TELEPHONE ENCOUNTER
"Received MyChart message from Rashawn.  He said that PCP has made many med changes, and he wanted us to know:    \"Thank you again for your time last week Dr. Hercules. Yesterday I had a full physical with Dr. Bárbara Young at [facility]. My blood pressure was 140/90 yesterday. She adjusted my medications and are as follows: 1) Clonidine .1 mg 2) Diltiazem 240mg 3) Lisinopril-Hydrochlorothiazide  10 -12.5 mg 4) Metoprolol 75mg 5) Rosuvastatin 10 mg 6) Fish Oil 7) Multi-vitamin 8) Vitamin B-1. I feel good right now and will continue to monitor my blood pressure. Thanks again. Rashawn Short \"    Writer has placed call to [facility], spoke with Shima, requested a copy of the visit note that shows the doctor making these changes.   Then we can update the med list in [facility].   She said she will fax to [phone] attn Team 3.    Magalie Howell RN on 9/20/2023 at 11:13 AM        "

## 2023-09-29 ENCOUNTER — TELEPHONE (OUTPATIENT)
Dept: CARDIOLOGY | Facility: CLINIC | Age: 63
End: 2023-09-29
Payer: COMMERCIAL

## 2023-09-29 RX ORDER — METOPROLOL TARTRATE 75 MG/1
75 TABLET, FILM COATED ORAL 2 TIMES DAILY
Qty: 180 TABLET | Refills: 3 | COMMUNITY
Start: 2023-09-29 | End: 2024-10-03

## 2023-09-29 RX ORDER — ROSUVASTATIN CALCIUM 10 MG/1
10 TABLET, COATED ORAL DAILY
COMMUNITY
Start: 2023-09-29

## 2023-09-29 RX ORDER — LISINOPRIL/HYDROCHLOROTHIAZIDE 10-12.5 MG
1 TABLET ORAL DAILY
COMMUNITY
Start: 2023-09-29 | End: 2024-10-03

## 2023-09-29 NOTE — TELEPHONE ENCOUNTER
Received updated medication list from [facility].    Writer has updated our EPIC medication list to most current form.    Writer called to verify with patient, this is correct list.   He states it is, and also wanted to let us know his BP numbers have improved overall.  He is tracking daily at home, and the readings he gets are all less than 130\90.  Mostly lower 110\70 range.    Magalie Howell RN on 9/29/2023 at 1:39 PM

## 2023-10-17 ENCOUNTER — TRANSFERRED RECORDS (OUTPATIENT)
Dept: HEALTH INFORMATION MANAGEMENT | Facility: CLINIC | Age: 63
End: 2023-10-17

## 2023-11-11 ENCOUNTER — HEALTH MAINTENANCE LETTER (OUTPATIENT)
Age: 63
End: 2023-11-11

## 2024-08-07 DIAGNOSIS — I48.0 PAROXYSMAL ATRIAL FIBRILLATION (H): ICD-10-CM

## 2024-08-07 RX ORDER — DILTIAZEM HYDROCHLORIDE 240 MG/1
240 CAPSULE, COATED, EXTENDED RELEASE ORAL DAILY
Qty: 90 CAPSULE | Refills: 0 | Status: SHIPPED | OUTPATIENT
Start: 2024-08-07 | End: 2024-10-03

## 2024-08-07 NOTE — LETTER
August 7, 2024       TO: Rashawn Short  [address] [place] [id number]       Dear Rashawn Short,    We recently received a call from your pharmacy requesting a refill of your medication(s).    Our records indicate that you are due for follow-up with your Heart Care Provider. We will refill your medications for 3 months which will allow you enough time to be seen.    Please call [phone] to schedule your appointment.    Thank you for allowing [facility] to be a part of your health care team and we look forward to seeing you soon.    Thank you,    [facility]

## 2024-10-03 ENCOUNTER — OFFICE VISIT (OUTPATIENT)
Dept: CARDIOLOGY | Facility: CLINIC | Age: 64
End: 2024-10-03
Payer: COMMERCIAL

## 2024-10-03 VITALS
DIASTOLIC BLOOD PRESSURE: 91 MMHG | OXYGEN SATURATION: 99 % | HEART RATE: 54 BPM | SYSTOLIC BLOOD PRESSURE: 168 MMHG | BODY MASS INDEX: 32.21 KG/M2 | WEIGHT: 225 LBS | HEIGHT: 70 IN

## 2024-10-03 DIAGNOSIS — I48.0 PAROXYSMAL ATRIAL FIBRILLATION (H): ICD-10-CM

## 2024-10-03 DIAGNOSIS — I10 HYPERTENSION GOAL BP (BLOOD PRESSURE) < 140/90: Primary | ICD-10-CM

## 2024-10-03 DIAGNOSIS — I48.11 LONGSTANDING PERSISTENT ATRIAL FIBRILLATION (H): ICD-10-CM

## 2024-10-03 PROCEDURE — 99214 OFFICE O/P EST MOD 30 MIN: CPT | Performed by: NURSE PRACTITIONER

## 2024-10-03 PROCEDURE — G2211 COMPLEX E/M VISIT ADD ON: HCPCS | Performed by: NURSE PRACTITIONER

## 2024-10-03 RX ORDER — HYDROCHLOROTHIAZIDE 25 MG/1
25 TABLET ORAL DAILY
Qty: 90 TABLET | Refills: 3 | Status: SHIPPED | OUTPATIENT
Start: 2024-10-03

## 2024-10-03 RX ORDER — HYDROCHLOROTHIAZIDE 12.5 MG/1
25 TABLET ORAL DAILY
COMMUNITY
End: 2024-10-03

## 2024-10-03 RX ORDER — DILTIAZEM HYDROCHLORIDE 240 MG/1
240 CAPSULE, COATED, EXTENDED RELEASE ORAL DAILY
Qty: 90 CAPSULE | Refills: 3 | Status: SHIPPED | OUTPATIENT
Start: 2024-10-03

## 2024-10-03 RX ORDER — METOPROLOL TARTRATE 75 MG/1
75 TABLET ORAL 2 TIMES DAILY
Qty: 180 TABLET | Refills: 3 | Status: SHIPPED | OUTPATIENT
Start: 2024-10-03

## 2024-10-03 RX ORDER — LISINOPRIL 20 MG/1
20 TABLET ORAL DAILY
Qty: 90 TABLET | Refills: 3 | Status: SHIPPED | OUTPATIENT
Start: 2024-10-03 | End: 2024-10-03

## 2024-10-03 RX ORDER — LISINOPRIL 10 MG/1
10 TABLET ORAL DAILY
Qty: 90 TABLET | Refills: 3 | Status: SHIPPED | OUTPATIENT
Start: 2024-10-03

## 2024-10-03 NOTE — PATIENT INSTRUCTIONS
Today's Recommendations    Add lisinopril 10 mg daily   Monitor BP at home with goal <140/90  Continue all other medications without changes.  Please follow up with cardiology in 1 year.    Please send BALALIKEA message or call for further questions or concerns.     It was a pleasure to see you today.     Mary Roman, APRN, CNP    EP [phone]  General scheduling and after hours number [phone]  EP scheduling [phone]

## 2024-10-03 NOTE — LETTER
10/3/2024    Bárbara Young MD  [address] [facility] [address]  [facility] [address]    RE: Rashawn Short       Dear Colleague,     I had the pleasure of seeing Rashawn Short in the [facility].    Electrophysiology Clinic Progress Note  Rashawn Short MRN# [id number]   YOB: 1960 Age: 64 year old     Primary cardiologist: Dr. Hercules (General)    Reason for visit:Atrial fibrillation     History of presenting illness:    Rashawn Short, a pleasant 63 year old patient who has a possible history significant for:    Asymptomatic permanent atrial fibrillation: Rate controled on metoprolol   [id number] score 1 (hypertension)  Hypertension  Prior alcoholism   Mild KATIE     He was previously evaluated by Dr. Hercules for atrial fibrillation in the setting of EtOH abuse. He then underwent a FREDI guided cardioversion after starting on amiodarone in addition to metoprolol for rate control.  Unfortunately he did return to atrial fibrillation and therefore rhythm control was abandoned and amiodarone and anticoagulation were abandoned.      Outpatient sleep study noted mild obstructive sleep apnea was recommended a home CPAP versus oral appliance versus weight management and optimizing sleep hygiene    He was admitted to [facility] on 5/6/2023.  He was evaluated by cardiology with palpitations and ECG confirmed atrial fibrillation with RVR at rates between 130-150 bpm.  He was given an IV amiodarone bolus and infusion was commenced also, IV diltiazem was started.  His prior to admission metoprolol was increased to 100 mg twice daily and he was started on Xarelto 20 mg daily.    Dr. Shelton saw the patient in consultation the patient was symptomatically improved with rate control.  Oral diltiazem was added to metoprolol XL and was recommended that he return in 1 month to discuss rhythm control versus rate control possible cardioversion. At follow up he reported that he was asymptomatic with rate  control. Therefore, rhythm control was abandoned.     Today he returns for an annual follow up and doing well.  He celebrates 7 years of sobriety, his 64th birthday and 30-year wedding anniversary with his wife.  He is very grateful for his family and his health.  We discussed that his blood pressure significantly elevated today and has been on multiple readings within our clinic.  At some point lisinopril was discontinued as he was previously on lisinopril-HCTZ 10-12.5 mg daily.  At his visit with Dr. Hercules last year lisinopril was increased to 20 mg daily.    Diagnostic studies:  EKG 6/6/2023: Atrial fibrillation at 94 bpm  Echocardiogram 5/2023: LVEF 50 to 55% with normal RV structure and function and size.  No significant valvular disease.  Mildly elevated ascending aorta at 4.0 cm and mildly bilateral atria.  FREDI 1/2021: LVEF of 35 to 40% with moderate global hypokinesia of the LV.  RV was normal size and function.  LA was moderately dilated and RA was mildly dilated.  No significant valvular disease.  Echocardiogram 2020: LVEF 55% with borderline global hypokinesis of the LV and mild bilateral atrial enlargement.  Mild dilatation of the ascending aorta         Assessment and Plan:     ASSESSMENT:    Asymptomatic permanent atrial fibrillation   [id number] score 1 (hypertension)  Status post FREDI guided cardioversion in 1/2021 and return to atrial fibrillation.  Rhythm control was abandoned given patient was asymptomatic  Rate controlled with metoprolol tartrate 75 mg twice daily    Nonsustained VT  14 runs of VT noted on Zio patch and patient was asymptomatic  Nuclear stress test noted small area of ischemia in the mid distal anterior segments and the distal inferior segments of the LV    Hypertension  Elevated today and on previous readings  Currently on HCTZ 25 mg daily as well as metoprolol 75 mg twice daily, clonidine 0.1 mg daily and diltiazem 240 mg daily  Previously on lisinopril 10 mg daily and  patient is unsure why this was discontinued    PLAN:     Recommend restarting lisinopril 10 mg daily  Monitoring blood pressure  Patient states he has PCP follow-up in the next few months and BP can be reassessed at that time  Return to clinic in 1 year or sooner if needed.  Of note, patient follows with [facility] for not on epic.  Please obtain paper records for next year's office visit.     Orders this Visit:  Orders Placed This Encounter   Procedures     Follow-Up with Cardiology CHRISTY     Orders Placed This Encounter   Medications     DISCONTD: hydroCHLOROthiazide 12.5 MG tablet     Sig: Take 25 mg by mouth daily.     DISCONTD: lisinopril (ZESTRIL) 20 MG tablet     Sig: Take 1 tablet (20 mg) by mouth daily.     Dispense:  90 tablet     Refill:  3     diltiazem ER COATED BEADS (CARDIZEM CD/CARTIA XT) 240 MG 24 hr capsule     Sig: Take 1 capsule (240 mg) by mouth daily.     Dispense:  90 capsule     Refill:  3     hydroCHLOROthiazide (HYDRODIURIL) 25 MG tablet     Sig: Take 1 tablet (25 mg) by mouth daily.     Dispense:  90 tablet     Refill:  3     Metoprolol Tartrate 75 MG TABS     Sig: Take 75 mg by mouth 2 times daily.     Dispense:  180 tablet     Refill:  3     lisinopril (ZESTRIL) 10 MG tablet     Sig: Take 1 tablet (10 mg) by mouth daily.     Dispense:  90 tablet     Refill:  3     Please see updated script.Wish for 10 not 20 mg daily.     Medications Discontinued During This Encounter   Medication Reason     lisinopril-hydrochlorothiazide (ZESTORETIC) 10-12.5 MG tablet      metoprolol tartrate 75 MG TABS Reorder (No AVS)     diltiazem ER COATED BEADS (CARDIZEM CD/CARTIA XT) 240 MG 24 hr capsule Reorder (No AVS)     hydroCHLOROthiazide 12.5 MG tablet Reorder (No AVS)     lisinopril (ZESTRIL) 20 MG tablet      Today's clinic visit entailed:  Review of the result(s) of each unique test - EKG, Echo, Zio, Stres  Prescription drug management  30 minutes spent by me on the date of the encounter  "doing chart review, history and exam, documentation and further activities per the note  Provider  Link to [facility] Help Grid     The level of medical decision making during this visit was of moderate complexity.    The longitudinal plan of care for the diagnosis(es)/condition(s) as documented were addressed during this visit. Due to the added complexity in care, I will continue to support Eugene Han in the subsequent management and with ongoing continuity of care.          Review of Systems:     Review of Systems:  Skin:        Eyes:       ENT:       Respiratory:  Negative sleep apnea  Cardiovascular:  Negative    Gastroenterology:      Genitourinary:       Musculoskeletal:       Neurologic:       Psychiatric:       Heme/Lymph/Imm:       Endocrine:  Negative              Physical Exam:     Vitals: BP (!) 168/91   Pulse 54   Ht 1.778 m (5' 10\")   Wt 102.1 kg (225 lb)   SpO2 99%   BMI 32.28 kg/m    Constitutional: Well nourished and in no apparent distress.  Eyes: Pupils equal, round. Sclerae anicteric.   HEENT: Normocephalic, atraumatic.   Neck: Supple  Respiratory: Breathing non-labored. Lungs clear to auscultation bilaterally. No crackles, wheezes, rhonchi, or rales.  Cardiovascular: IRR rate and rhythm, normal S1 and S2. No murmur, rub, or gallop.  Skin: Warm, dry. No rashes, cyanosis, or xanthelasma.  Extremities: No edema.  Neurologic: No gross motor deficits. Alert, awake, and oriented to person, place and time.  Psychiatric: Affect appropriate.        CURRENT MEDICATIONS:  Current Outpatient Medications   Medication Sig Dispense Refill     cloNIDine (CATAPRES) 0.1 MG tablet Take 0.1 mg by mouth every morning       diltiazem ER COATED BEADS (CARDIZEM CD/CARTIA XT) 240 MG 24 hr capsule Take 1 capsule (240 mg) by mouth daily. 90 capsule 3     fish oil-omega-3 fatty acids 1000 MG capsule Take 1 g by mouth daily       hydroCHLOROthiazide (HYDRODIURIL) 25 MG tablet Take 1 tablet (25 mg) by mouth daily. 90 " tablet 3     lisinopril (ZESTRIL) 10 MG tablet Take 1 tablet (10 mg) by mouth daily. 90 tablet 3     Metoprolol Tartrate 75 MG TABS Take 75 mg by mouth 2 times daily. 180 tablet 3     multivitamin, therapeutic (THERA-VIT) TABS tablet Take 1 tablet by mouth daily       rosuvastatin (CRESTOR) 10 MG tablet Take 1 tablet (10 mg) by mouth daily       thiamine (B-1) 100 MG tablet Take 100 mg by mouth daily       hypromellose (ARTIFICIAL TEARS) 0.5 % SOLN ophthalmic solution Place 1 drop into both eyes every hour as needed for dry eyes (Patient not taking: Reported on 6/6/2023)         ALLERGIES  No Known Allergies      PAST MEDICAL HISTORY:  Past Medical History:   Diagnosis Date     Atrial fibrillation with RVR (H) 12/28/2020     Depressive disorder      EtOH dependence (H)      Hypertension      Paroxysmal atrial fibrillation (H) 01/29/2010       PAST SURGICAL HISTORY:  Past Surgical History:   Procedure Laterality Date     ANESTHESIA CARDIOVERSION N/A 1/11/2021    Procedure: ANESTHESIA, FOR CARDIOVERSION;  Surgeon: GENERIC ANESTHESIA PROVIDER;  Location:  OR     ORTHOPEDIC SURGERY  1998    ACL Right knee reconstruction-        FAMILY HISTORY:  Family History   Problem Relation Age of Onset     Hypertension Father      Arrhythmia Father         a-fib     Colon Polyps Sister      C.A.D. No family hx of      Diabetes No family hx of      Cerebrovascular Disease No family hx of      Breast Cancer No family hx of      Cancer - colorectal No family hx of      Prostate Cancer No family hx of        SOCIAL HISTORY:  Social History     Socioeconomic History     Marital status:      Spouse name: None     Number of children: None     Years of education: None     Highest education level: None   Tobacco Use     Smoking status: Never     Smokeless tobacco: Never   Substance and Sexual Activity     Alcohol use: Not Currently     Comment: no alcolhol 2017     Drug use: No     Sexual activity: Yes     Partners: Female                Thank you for allowing me to participate in the care of your patient.      Sincerely,     P[facility]  cc:   Bárbara Young MD  [address]  [place]  [place] [address]

## 2024-10-03 NOTE — PROGRESS NOTES
Electrophysiology Clinic Progress Note  Rashawn Short MRN# [id number]   YOB: 1960 Age: 64 year old     Primary cardiologist: Dr. Hercules (General)    Reason for visit:Atrial fibrillation     History of presenting illness:    Rashawn Short, a pleasant 63 year old patient who has a possible history significant for:    Asymptomatic permanent atrial fibrillation: Rate controled on metoprolol   [id number] score 1 (hypertension)  Hypertension  Prior alcoholism   Mild KATIE     He was previously evaluated by Dr. Hercules for atrial fibrillation in the setting of EtOH abuse. He then underwent a FREDI guided cardioversion after starting on amiodarone in addition to metoprolol for rate control.  Unfortunately he did return to atrial fibrillation and therefore rhythm control was abandoned and amiodarone and anticoagulation were abandoned.      Outpatient sleep study noted mild obstructive sleep apnea was recommended a home CPAP versus oral appliance versus weight management and optimizing sleep hygiene    He was admitted to [facility] on 5/6/2023.  He was evaluated by cardiology with palpitations and ECG confirmed atrial fibrillation with RVR at rates between 130-150 bpm.  He was given an IV amiodarone bolus and infusion was commenced also, IV diltiazem was started.  His prior to admission metoprolol was increased to 100 mg twice daily and he was started on Xarelto 20 mg daily.    Dr. Shelton saw the patient in consultation the patient was symptomatically improved with rate control.  Oral diltiazem was added to metoprolol XL and was recommended that he return in 1 month to discuss rhythm control versus rate control possible cardioversion. At follow up he reported that he was asymptomatic with rate control. Therefore, rhythm control was abandoned.     Today he returns for an annual follow up and doing well.  He celebrates 7 years of sobriety, his 64th birthday and 30-year wedding anniversary with his  wife.  He is very grateful for his family and his health.  We discussed that his blood pressure significantly elevated today and has been on multiple readings within our clinic.  At some point lisinopril was discontinued as he was previously on lisinopril-HCTZ 10-12.5 mg daily.  At his visit with Dr. Hercules last year lisinopril was increased to 20 mg daily.    Diagnostic studies:  EKG 6/6/2023: Atrial fibrillation at 94 bpm  Echocardiogram 5/2023: LVEF 50 to 55% with normal RV structure and function and size.  No significant valvular disease.  Mildly elevated ascending aorta at 4.0 cm and mildly bilateral atria.  FREDI 1/2021: LVEF of 35 to 40% with moderate global hypokinesia of the LV.  RV was normal size and function.  LA was moderately dilated and RA was mildly dilated.  No significant valvular disease.  Echocardiogram 2020: LVEF 55% with borderline global hypokinesis of the LV and mild bilateral atrial enlargement.  Mild dilatation of the ascending aorta         Assessment and Plan:     ASSESSMENT:    Asymptomatic permanent atrial fibrillation   [id number] score 1 (hypertension)  Status post FREDI guided cardioversion in 1/2021 and return to atrial fibrillation.  Rhythm control was abandoned given patient was asymptomatic  Rate controlled with metoprolol tartrate 75 mg twice daily    Nonsustained VT  14 runs of VT noted on Zio patch and patient was asymptomatic  Nuclear stress test noted small area of ischemia in the mid distal anterior segments and the distal inferior segments of the LV    Hypertension  Elevated today and on previous readings  Currently on HCTZ 25 mg daily as well as metoprolol 75 mg twice daily, clonidine 0.1 mg daily and diltiazem 240 mg daily  Previously on lisinopril 10 mg daily and patient is unsure why this was discontinued    PLAN:     Recommend restarting lisinopril 10 mg daily  Monitoring blood pressure  Patient states he has PCP follow-up in the next few months and BP can be reassessed  at that time  Return to clinic in 1 year or sooner if needed.  Of note, patient follows with [facility] for not on epic.  Please obtain paper records for next year's office visit.     Orders this Visit:  Orders Placed This Encounter   Procedures    Follow-Up with Cardiology CHRISTY     Orders Placed This Encounter   Medications    DISCONTD: hydroCHLOROthiazide 12.5 MG tablet     Sig: Take 25 mg by mouth daily.    DISCONTD: lisinopril (ZESTRIL) 20 MG tablet     Sig: Take 1 tablet (20 mg) by mouth daily.     Dispense:  90 tablet     Refill:  3    diltiazem ER COATED BEADS (CARDIZEM CD/CARTIA XT) 240 MG 24 hr capsule     Sig: Take 1 capsule (240 mg) by mouth daily.     Dispense:  90 capsule     Refill:  3    hydroCHLOROthiazide (HYDRODIURIL) 25 MG tablet     Sig: Take 1 tablet (25 mg) by mouth daily.     Dispense:  90 tablet     Refill:  3    Metoprolol Tartrate 75 MG TABS     Sig: Take 75 mg by mouth 2 times daily.     Dispense:  180 tablet     Refill:  3    lisinopril (ZESTRIL) 10 MG tablet     Sig: Take 1 tablet (10 mg) by mouth daily.     Dispense:  90 tablet     Refill:  3     Please see updated script.Wish for 10 not 20 mg daily.     Medications Discontinued During This Encounter   Medication Reason    lisinopril-hydrochlorothiazide (ZESTORETIC) 10-12.5 MG tablet     metoprolol tartrate 75 MG TABS Reorder (No AVS)    diltiazem ER COATED BEADS (CARDIZEM CD/CARTIA XT) 240 MG 24 hr capsule Reorder (No AVS)    hydroCHLOROthiazide 12.5 MG tablet Reorder (No AVS)    lisinopril (ZESTRIL) 20 MG tablet      Today's clinic visit entailed:  Review of the result(s) of each unique test - EKG, Echo, Zio, Stres  Prescription drug management  30 minutes spent by me on the date of the encounter doing chart review, history and exam, documentation and further activities per the note  Provider  Link to [facility] Help Grid     The level of medical decision making during this visit was of moderate complexity.    The longitudinal  "plan of care for the diagnosis(es)/condition(s) as documented were addressed during this visit. Due to the added complexity in care, I will continue to support Rashawn Short in the subsequent management and with ongoing continuity of care.          Review of Systems:     Review of Systems:  Skin:        Eyes:       ENT:       Respiratory:  Negative sleep apnea  Cardiovascular:  Negative    Gastroenterology:      Genitourinary:       Musculoskeletal:       Neurologic:       Psychiatric:       Heme/Lymph/Imm:       Endocrine:  Negative              Physical Exam:     Vitals: BP (!) 168/91   Pulse 54   Ht 1.778 m (5' 10\")   Wt 102.1 kg (225 lb)   SpO2 99%   BMI 32.28 kg/m    Constitutional: Well nourished and in no apparent distress.  Eyes: Pupils equal, round. Sclerae anicteric.   HEENT: Normocephalic, atraumatic.   Neck: Supple  Respiratory: Breathing non-labored. Lungs clear to auscultation bilaterally. No crackles, wheezes, rhonchi, or rales.  Cardiovascular: IRR rate and rhythm, normal S1 and S2. No murmur, rub, or gallop.  Skin: Warm, dry. No rashes, cyanosis, or xanthelasma.  Extremities: No edema.  Neurologic: No gross motor deficits. Alert, awake, and oriented to person, place and time.  Psychiatric: Affect appropriate.        CURRENT MEDICATIONS:  Current Outpatient Medications   Medication Sig Dispense Refill    cloNIDine (CATAPRES) 0.1 MG tablet Take 0.1 mg by mouth every morning      diltiazem ER COATED BEADS (CARDIZEM CD/CARTIA XT) 240 MG 24 hr capsule Take 1 capsule (240 mg) by mouth daily. 90 capsule 3    fish oil-omega-3 fatty acids 1000 MG capsule Take 1 g by mouth daily      hydroCHLOROthiazide (HYDRODIURIL) 25 MG tablet Take 1 tablet (25 mg) by mouth daily. 90 tablet 3    lisinopril (ZESTRIL) 10 MG tablet Take 1 tablet (10 mg) by mouth daily. 90 tablet 3    Metoprolol Tartrate 75 MG TABS Take 75 mg by mouth 2 times daily. 180 tablet 3    multivitamin, therapeutic (THERA-VIT) TABS tablet Take 1 " tablet by mouth daily      rosuvastatin (CRESTOR) 10 MG tablet Take 1 tablet (10 mg) by mouth daily      thiamine (B-1) 100 MG tablet Take 100 mg by mouth daily      hypromellose (ARTIFICIAL TEARS) 0.5 % SOLN ophthalmic solution Place 1 drop into both eyes every hour as needed for dry eyes (Patient not taking: Reported on 6/6/2023)         ALLERGIES  No Known Allergies      PAST MEDICAL HISTORY:  Past Medical History:   Diagnosis Date    Atrial fibrillation with RVR (H) 12/28/2020    Depressive disorder     EtOH dependence (H)     Hypertension     Paroxysmal atrial fibrillation (H) 01/29/2010       PAST SURGICAL HISTORY:  Past Surgical History:   Procedure Laterality Date    ANESTHESIA CARDIOVERSION N/A 1/11/2021    Procedure: ANESTHESIA, FOR CARDIOVERSION;  Surgeon: GENERIC ANESTHESIA PROVIDER;  Location:  OR    ORTHOPEDIC SURGERY  1998    ACL Right knee reconstruction-        FAMILY HISTORY:  Family History   Problem Relation Age of Onset    Hypertension Father     Arrhythmia Father         a-fib    Colon Polyps Sister     C.A.D. No family hx of     Diabetes No family hx of     Cerebrovascular Disease No family hx of     Breast Cancer No family hx of     Cancer - colorectal No family hx of     Prostate Cancer No family hx of        SOCIAL HISTORY:  Social History     Socioeconomic History    Marital status:      Spouse name: None    Number of children: None    Years of education: None    Highest education level: None   Tobacco Use    Smoking status: Never    Smokeless tobacco: Never   Substance and Sexual Activity    Alcohol use: Not Currently     Comment: no alcolhol 2017    Drug use: No    Sexual activity: Yes     Partners: Female

## 2024-10-07 ENCOUNTER — TELEPHONE (OUTPATIENT)
Dept: CARDIOLOGY | Facility: CLINIC | Age: 64
End: 2024-10-07
Payer: COMMERCIAL

## 2024-10-26 ENCOUNTER — HEALTH MAINTENANCE LETTER (OUTPATIENT)
Age: 64
End: 2024-10-26

## 2025-07-17 ENCOUNTER — OFFICE VISIT (OUTPATIENT)
Dept: CARDIOLOGY | Facility: CLINIC | Age: 65
End: 2025-07-17
Payer: COMMERCIAL

## 2025-07-17 VITALS
BODY MASS INDEX: 32.56 KG/M2 | HEIGHT: 70 IN | HEART RATE: 78 BPM | WEIGHT: 227.4 LBS | DIASTOLIC BLOOD PRESSURE: 104 MMHG | SYSTOLIC BLOOD PRESSURE: 154 MMHG

## 2025-07-17 DIAGNOSIS — I10 HYPERTENSION GOAL BP (BLOOD PRESSURE) < 140/90: Primary | ICD-10-CM

## 2025-07-17 DIAGNOSIS — R03.0 BLOOD PRESSURE ELEVATED WITHOUT HISTORY OF HTN: ICD-10-CM

## 2025-07-17 DIAGNOSIS — I48.0 PAROXYSMAL ATRIAL FIBRILLATION (H): ICD-10-CM

## 2025-07-17 DIAGNOSIS — E78.5 HYPERLIPIDEMIA LDL GOAL <130: ICD-10-CM

## 2025-07-17 RX ORDER — SENNOSIDES 8.6 MG
325 CAPSULE ORAL DAILY
COMMUNITY
End: 2025-07-17

## 2025-07-17 NOTE — LETTER
7/17/2025    Bárbara Young MD  [address]  [facility] [address]    RE: Rashawn Short       Dear Colleague,     I had the pleasure of seeing Rashawn Short in the [facility].  HPI and Plan:   Today I had the pleasure of seeing Rashawn Short at [facility]. He is a pleasant 65 year old patient with a past medical history of paroxysmal atrial fibrillation, hypertension, prior alcoholism [sober 5 years] and depression who presents to the clinic for a follow-up visit.  He is an extremely sweet gentleman.    He has a history of atrial fibrillation and underwent FREDI guided cardioversion in the past.  He was also started on amiodarone but did not stay in sinus rhythm.  Because at that time his [id number] was low he was taken off anticoagulation and amiodarone.  However now because he is over 65 the [id number] is increased to 2    Today, the patient tells me that he has been feeling extremely well.  He is extremely kind and thanks me repeatedly for taking care of him.   He also reports trying to eat healthy and continues to lose weight and goes to the gym 5 times a week.   He also swims regularly. I congratulated him on his efforts.      Diagnostic studies:  EKG (6/6/2023): Atrial fibrillation at 94 bpm  Echocardiogram (5/2023): LVEF 50 to 55% with normal RV structure and function and size.  No significant valvular disease.  Mildly elevated ascending aorta at 4.0 cm and mildly bilateral atria.  FREDI (1/2021): LVEF of 35 to 40% with moderate global hypokinesia of the LV.  RV was normal size and function.  LA was moderately dilated and RA was mildly dilated.  No significant valvular disease.  Echocardiogram (2020): LVEF 55% with borderline global hypokinesis of the LV and mild bilateral atrial enlargement.  Mild dilatation of the ascending aorta    Assessment and plan:   1.  Atrial fibrillation-chads 2 vascular score of 2  age and hypertension.  For hypertension.  2.   Essential hypertension-uncontrolled  3.  Alcoholism-sober for last 5 years  4.  Obesity-undertaking lifestyle modification  5.  Mild sleep apnea  6.  Abnormal Lexiscan followed by near normal CT calcium scan  7.  Hyperlipidemia-on Crestor  8.  Mild cardiomyopathy-ejection fraction 50-55%, improved from before    It was an absolute Pleasure to See Mr Short in clinic today.  He is doing very well from cardiac standpoint and does not have any major complaints today.  He is living a very healthy lifestyle and I have urged him to continue with his efforts.      From A-fib standpoint, it has previously been decided to pursue rate control therapy.  For this reason and because he failed amiodarone it was discontinued.  He is on metoprolol and diltiazem, both of which I will continue.  Since he is now at higher risk of stroke  I am going to start him on anticoagulation with Xarelto.  He is going to discontinue aspirin after starting Xarelto.    His blood pressure remains very poorly controlled.  I am going to increase the dose of lisinopril to 30 mg and also do an ambulatory blood pressure monitor to rule out whitecoat syndrome.  This ambulatory blood pressure monitor should be performed while on 30 mg of aspirin.  If the numbers are still high I am going to max out lisinopril and then probably add aldosterone antagonist.  I also asked him to regularly check his blood pressure at home and bring the blood pressure diary to the next clinic visit which have scheduled for a month from now.  It can either be with me or with an advanced provider.    Thank you for allowing me to participate in the care of Rashawn Short    This note was completed in part using Dragon voice recognition software. Although reviewed after completion, some word and grammatical errors may occur.    Austni Hercules MD  Cardiology    No orders of the defined types were placed in this encounter.      Orders Placed This Encounter   Medications     aspirin  (ASA) 325 MG EC tablet     Sig: Take 325 mg by mouth daily.       There are no discontinued medications.      Encounter Diagnoses   Name Primary?     Hypertension goal BP (blood pressure) < 140/90 Yes     Paroxysmal atrial fibrillation (H)      Hyperlipidemia LDL goal <130        CURRENT MEDICATIONS:  Current Outpatient Medications   Medication Sig Dispense Refill     aspirin (ASA) 325 MG EC tablet Take 325 mg by mouth daily.       cloNIDine (CATAPRES) 0.1 MG tablet Take 0.1 mg by mouth every morning       diltiazem ER COATED BEADS (CARDIZEM CD/CARTIA XT) 240 MG 24 hr capsule Take 1 capsule (240 mg) by mouth daily. 90 capsule 3     fish oil-omega-3 fatty acids 1000 MG capsule Take 1 g by mouth daily       hydroCHLOROthiazide (HYDRODIURIL) 25 MG tablet Take 1 tablet (25 mg) by mouth daily. 90 tablet 3     lisinopril (ZESTRIL) 10 MG tablet Take 1 tablet (10 mg) by mouth daily. 90 tablet 3     Metoprolol Tartrate 75 MG TABS Take 75 mg by mouth 2 times daily. 180 tablet 3     multivitamin, therapeutic (THERA-VIT) TABS tablet Take 1 tablet by mouth daily       rosuvastatin (CRESTOR) 10 MG tablet Take 1 tablet (10 mg) by mouth daily       thiamine (B-1) 100 MG tablet Take 100 mg by mouth daily       hypromellose (ARTIFICIAL TEARS) 0.5 % SOLN ophthalmic solution Place 1 drop into both eyes every hour as needed for dry eyes (Patient not taking: Reported on 6/6/2023)         ALLERGIES   No Known Allergies    PAST MEDICAL HISTORY:  Past Medical History:   Diagnosis Date     Atrial fibrillation with RVR (H) 12/28/2020     Depressive disorder      EtOH dependence (H)      Hypertension      Paroxysmal atrial fibrillation (H) 01/29/2010       PAST SURGICAL HISTORY:  Past Surgical History:   Procedure Laterality Date     ANESTHESIA CARDIOVERSION N/A 1/11/2021    Procedure: ANESTHESIA, FOR CARDIOVERSION;  Surgeon: GENERIC ANESTHESIA PROVIDER;  Location:  OR     ORTHOPEDIC SURGERY  1998    ACL Right knee reconstruction-   "      FAMILY HISTORY:  Family History   Problem Relation Age of Onset     Hypertension Father      Arrhythmia Father         a-fib     Colon Polyps Sister      C.A.D. No family hx of      Diabetes No family hx of      Cerebrovascular Disease No family hx of      Breast Cancer No family hx of      Cancer - colorectal No family hx of      Prostate Cancer No family hx of        SOCIAL HISTORY:  Social History     Socioeconomic History     Marital status:      Spouse name: None     Number of children: None     Years of education: None     Highest education level: None   Tobacco Use     Smoking status: Never     Smokeless tobacco: Never   Substance and Sexual Activity     Alcohol use: Not Currently     Comment: no alcolhol 2017     Drug use: No     Sexual activity: Yes     Partners: Female       Review of Systems:  Skin:          Eyes:         ENT:         Respiratory:  Negative       Cardiovascular:  Negative      Gastroenterology:        Genitourinary:         Musculoskeletal:         Neurologic:         Psychiatric:         Heme/Lymph/Imm:  Negative      Endocrine:  Negative        Physical Exam:  Vitals: BP (!) 150/97   Pulse 84   Ht 1.778 m (5' 10\")   Wt 103.1 kg (227 lb 6.4 oz)   BMI 32.63 kg/m    Eyes: No icterus.  Pulmonary: Chest symmetric, lungs clear bilaterally and no crackles, wheezes or rales.  Cardiovascular: RRR with normal S1 and S2, no murmur, JVP normal.  Musculoskeletal: Edema of the lower extremities: None.  Neurologic: Oriented and appropriate without obvious focal deficits.   Psychiatric: Normal affect.     Recent Lab Results:  LIPID RESULTS:  Lab Results   Component Value Date    CHOL 132 02/03/2023    CHOL 208 (H) 03/02/2005    HDL 44 02/03/2023    HDL 61 03/02/2005    LDL 66 02/03/2023     03/02/2005    TRIG 149 01/02/2025    TRIG 112 03/02/2005    CHOLHDLRATIO 3.4 03/02/2005       LIVER ENZYME RESULTS:  Lab Results   Component Value Date    AST 50 (H) 12/28/2020    ALT 21 " "02/03/2023    ALT 98 (H) 12/28/2020       CBC RESULTS:  Lab Results   Component Value Date    WBC 10.8 05/07/2023    WBC 8.2 12/29/2020    RBC 5.14 05/07/2023    RBC 5.60 12/29/2020    HGB 15.6 05/07/2023    HGB 16.5 12/29/2020    HCT 45.1 05/07/2023    HCT 49.8 12/29/2020    MCV 88 05/07/2023    MCV 89 12/29/2020    MCH 30.4 05/07/2023    MCH 29.5 12/29/2020    MCHC 34.6 05/07/2023    MCHC 33.1 12/29/2020    RDW 13.1 05/07/2023    RDW 13.7 12/29/2020     05/07/2023     12/29/2020       BMP RESULTS:  Lab Results   Component Value Date     05/08/2023     12/29/2020    POTASSIUM 3.7 05/08/2023    POTASSIUM 4.0 12/20/2021    POTASSIUM 3.7 01/11/2021    CHLORIDE 100 01/02/2025    CHLORIDE 108 12/29/2020    CO2 20 (L) 05/07/2023    CO2 29 12/20/2021    CO2 26 12/29/2020    ANIONGAP 15 05/07/2023    ANIONGAP 4 12/20/2021    ANIONGAP 3 12/29/2020     (H) 05/07/2023    GLC 81 12/20/2021    GLC 86 12/29/2020    BUN 18.0 05/07/2023    BUN 16 12/20/2021    BUN 15 12/29/2020    CR 1.00 05/08/2023    CR 0.98 12/29/2020    GFRESTIMATED 85 05/08/2023    GFRESTIMATED 82 12/29/2020    GFRESTBLACK >90 12/29/2020    KELSEY 8.2 (L) 05/07/2023    KELSEY 8.6 12/29/2020        A1C RESULTS:  No results found for: \"A1C\"    INR RESULTS:  Lab Results   Component Value Date    INR 1.08 05/06/2023    INR 1.74 (H) 01/11/2021    INR 0.98 12/28/2020       CC  Austin Hercules MD  [address]  [place] [address]    All medical records were reviewed in detail and discussed with the patient. Greater than 30 mins were spent with the patient, 50% of this time was spent on counseling and coordination of care.  After visit summary was printed and given to the patient.    Thank you for allowing me to participate in the care of your patient.      Sincerely,     Austin Hercules MD     [facility]  cc:   Austin Hercules MD  [address]  [place] " [id number]

## 2025-07-17 NOTE — PROGRESS NOTES
HPI and Plan:   Today I had the pleasure of seeing Rashawn Short at [facility]. He is a pleasant 65 year old patient with a past medical history of paroxysmal atrial fibrillation, hypertension, prior alcoholism [sober 5 years] and depression who presents to the clinic for a follow-up visit.  He is an extremely sweet gentleman.    He has a history of atrial fibrillation and underwent FREDI guided cardioversion in the past.  He was also started on amiodarone but did not stay in sinus rhythm.  Because at that time his [id number] was low he was taken off anticoagulation and amiodarone.  However now because he is over 64 the [id number] is increased to 2    Today, the patient tells me that he has been feeling extremely well.  He is extremely kind and thanks me repeatedly for taking care of him.   He also reports trying to eat healthy and continues to lose weight and goes to the gym 5 times a week.   He also swims regularly. I congratulated him on his efforts.      Diagnostic studies:  EKG (6/6/2023): Atrial fibrillation at 94 bpm  Echocardiogram (5/2023): LVEF 50 to 55% with normal RV structure and function and size.  No significant valvular disease.  Mildly elevated ascending aorta at 4.0 cm and mildly bilateral atria.  FREDI (1/2021): LVEF of 35 to 40% with moderate global hypokinesia of the LV.  RV was normal size and function.  LA was moderately dilated and RA was mildly dilated.  No significant valvular disease.  Echocardiogram (2020): LVEF 55% with borderline global hypokinesis of the LV and mild bilateral atrial enlargement.  Mild dilatation of the ascending aorta    Assessment and plan:   1.  Atrial fibrillation-chads 2 vascular score of 2  age and hypertension.  For hypertension.  2.  Essential hypertension-uncontrolled  3.  Alcoholism-sober for last 5 years  4.  Obesity-undertaking lifestyle modification  5.  Mild sleep apnea  6.  Abnormal Lexiscan followed by near normal CT calcium  scan  7.  Hyperlipidemia-on Crestor  8.  Mild cardiomyopathy-ejection fraction 50-55%, improved from before    It was an absolute Pleasure to See Mr Short in clinic today.  He is doing very well from cardiac standpoint and does not have any major complaints today.  He is living a very healthy lifestyle and I have urged him to continue with his efforts.      From A-fib standpoint, it has previously been decided to pursue rate control therapy.  For this reason and because he failed amiodarone it was discontinued.  He is on metoprolol and diltiazem, both of which I will continue.  Since he is now at higher risk of stroke  I am going to start him on anticoagulation with Xarelto.  He is going to discontinue aspirin after starting Xarelto.    His blood pressure remains very poorly controlled.  I am going to increase the dose of lisinopril to 30 mg and also do an ambulatory blood pressure monitor to rule out whitecoat syndrome.  This ambulatory blood pressure monitor should be performed while on 30 mg of aspirin.  If the numbers are still high I am going to max out lisinopril and then probably add aldosterone antagonist.  I also asked him to regularly check his blood pressure at home and bring the blood pressure diary to the next clinic visit which have scheduled for a month from now.  It can either be with me or with an advanced provider.    Thank you for allowing me to participate in the care of Rashawn hSort    This note was completed in part using Dragon voice recognition software. Although reviewed after completion, some word and grammatical errors may occur.    Austin Hercules MD  Cardiology    No orders of the defined types were placed in this encounter.      Orders Placed This Encounter   Medications    aspirin (ASA) 325 MG EC tablet     Sig: Take 325 mg by mouth daily.       There are no discontinued medications.      Encounter Diagnoses   Name Primary?    Hypertension goal BP (blood pressure) < 140/90 Yes     Paroxysmal atrial fibrillation (H)     Hyperlipidemia LDL goal <130        CURRENT MEDICATIONS:  Current Outpatient Medications   Medication Sig Dispense Refill    aspirin (ASA) 325 MG EC tablet Take 325 mg by mouth daily.      cloNIDine (CATAPRES) 0.1 MG tablet Take 0.1 mg by mouth every morning      diltiazem ER COATED BEADS (CARDIZEM CD/CARTIA XT) 240 MG 24 hr capsule Take 1 capsule (240 mg) by mouth daily. 90 capsule 3    fish oil-omega-3 fatty acids 1000 MG capsule Take 1 g by mouth daily      hydroCHLOROthiazide (HYDRODIURIL) 25 MG tablet Take 1 tablet (25 mg) by mouth daily. 90 tablet 3    lisinopril (ZESTRIL) 10 MG tablet Take 1 tablet (10 mg) by mouth daily. 90 tablet 3    Metoprolol Tartrate 75 MG TABS Take 75 mg by mouth 2 times daily. 180 tablet 3    multivitamin, therapeutic (THERA-VIT) TABS tablet Take 1 tablet by mouth daily      rosuvastatin (CRESTOR) 10 MG tablet Take 1 tablet (10 mg) by mouth daily      thiamine (B-1) 100 MG tablet Take 100 mg by mouth daily      hypromellose (ARTIFICIAL TEARS) 0.5 % SOLN ophthalmic solution Place 1 drop into both eyes every hour as needed for dry eyes (Patient not taking: Reported on 6/6/2023)         ALLERGIES   No Known Allergies    PAST MEDICAL HISTORY:  Past Medical History:   Diagnosis Date    Atrial fibrillation with RVR (H) 12/28/2020    Depressive disorder     EtOH dependence (H)     Hypertension     Paroxysmal atrial fibrillation (H) 01/29/2010       PAST SURGICAL HISTORY:  Past Surgical History:   Procedure Laterality Date    ANESTHESIA CARDIOVERSION N/A 1/11/2021    Procedure: ANESTHESIA, FOR CARDIOVERSION;  Surgeon: GENERIC ANESTHESIA PROVIDER;  Location:  OR    ORTHOPEDIC SURGERY  1998    ACL Right knee reconstruction-        FAMILY HISTORY:  Family History   Problem Relation Age of Onset    Hypertension Father     Arrhythmia Father         a-fib    Colon Polyps Sister     C.A.D. No family hx of     Diabetes No family hx of     Cerebrovascular  "Disease No family hx of     Breast Cancer No family hx of     Cancer - colorectal No family hx of     Prostate Cancer No family hx of        SOCIAL HISTORY:  Social History     Socioeconomic History    Marital status:      Spouse name: None    Number of children: None    Years of education: None    Highest education level: None   Tobacco Use    Smoking status: Never    Smokeless tobacco: Never   Substance and Sexual Activity    Alcohol use: Not Currently     Comment: no alcolhol 2017    Drug use: No    Sexual activity: Yes     Partners: Female       Review of Systems:  Skin:          Eyes:         ENT:         Respiratory:  Negative       Cardiovascular:  Negative      Gastroenterology:        Genitourinary:         Musculoskeletal:         Neurologic:         Psychiatric:         Heme/Lymph/Imm:  Negative      Endocrine:  Negative        Physical Exam:  Vitals: BP (!) 150/97   Pulse 84   Ht 1.778 m (5' 10\")   Wt 103.1 kg (227 lb 6.4 oz)   BMI 32.63 kg/m    Eyes: No icterus.  Pulmonary: Chest symmetric, lungs clear bilaterally and no crackles, wheezes or rales.  Cardiovascular: RRR with normal S1 and S2, no murmur, JVP normal.  Musculoskeletal: Edema of the lower extremities: None.  Neurologic: Oriented and appropriate without obvious focal deficits.   Psychiatric: Normal affect.     Recent Lab Results:  LIPID RESULTS:  Lab Results   Component Value Date    CHOL 132 02/03/2023    CHOL 208 (H) 03/02/2005    HDL 44 02/03/2023    HDL 61 03/02/2005    LDL 66 02/03/2023     03/02/2005    TRIG 149 01/02/2025    TRIG 112 03/02/2005    CHOLHDLRATIO 3.4 03/02/2005       LIVER ENZYME RESULTS:  Lab Results   Component Value Date    AST 50 (H) 12/28/2020    ALT 21 02/03/2023    ALT 98 (H) 12/28/2020       CBC RESULTS:  Lab Results   Component Value Date    WBC 10.8 05/07/2023    WBC 8.2 12/29/2020    RBC 5.14 05/07/2023    RBC 5.60 12/29/2020    HGB 15.6 05/07/2023    HGB 16.5 12/29/2020    HCT 45.1 05/07/2023 " "   HCT 49.8 12/29/2020    MCV 88 05/07/2023    MCV 89 12/29/2020    MCH 30.4 05/07/2023    MCH 29.5 12/29/2020    MCHC 34.6 05/07/2023    MCHC 33.1 12/29/2020    RDW 13.1 05/07/2023    RDW 13.7 12/29/2020     05/07/2023     12/29/2020       BMP RESULTS:  Lab Results   Component Value Date     05/08/2023     12/29/2020    POTASSIUM 3.7 05/08/2023    POTASSIUM 4.0 12/20/2021    POTASSIUM 3.7 01/11/2021    CHLORIDE 100 01/02/2025    CHLORIDE 108 12/29/2020    CO2 20 (L) 05/07/2023    CO2 29 12/20/2021    CO2 26 12/29/2020    ANIONGAP 15 05/07/2023    ANIONGAP 4 12/20/2021    ANIONGAP 3 12/29/2020     (H) 05/07/2023    GLC 81 12/20/2021    GLC 86 12/29/2020    BUN 18.0 05/07/2023    BUN 16 12/20/2021    BUN 15 12/29/2020    CR 1.00 05/08/2023    CR 0.98 12/29/2020    GFRESTIMATED 85 05/08/2023    GFRESTIMATED 82 12/29/2020    GFRESTBLACK >90 12/29/2020    KELSEY 8.2 (L) 05/07/2023    KELSEY 8.6 12/29/2020        A1C RESULTS:  No results found for: \"A1C\"    INR RESULTS:  Lab Results   Component Value Date    INR 1.08 05/06/2023    INR 1.74 (H) 01/11/2021    INR 0.98 12/28/2020       CC  Austin Hercules MD  [address]  [place] [address]    All medical records were reviewed in detail and discussed with the patient. Greater than 30 mins were spent with the patient, 50% of this time was spent on counseling and coordination of care.  After visit summary was printed and given to the patient.  "

## 2025-07-24 ENCOUNTER — HOSPITAL ENCOUNTER (OUTPATIENT)
Dept: CARDIOLOGY | Facility: CLINIC | Age: 65
Discharge: HOME | End: 2025-07-24
Attending: INTERNAL MEDICINE
Payer: COMMERCIAL

## 2025-07-24 DIAGNOSIS — I10 HYPERTENSION GOAL BP (BLOOD PRESSURE) < 140/90: ICD-10-CM

## 2025-07-24 DIAGNOSIS — E78.5 HYPERLIPIDEMIA LDL GOAL <130: ICD-10-CM

## 2025-07-24 DIAGNOSIS — I48.0 PAROXYSMAL ATRIAL FIBRILLATION (H): ICD-10-CM

## 2025-07-24 DIAGNOSIS — R03.0 BLOOD PRESSURE ELEVATED WITHOUT HISTORY OF HTN: ICD-10-CM

## 2025-07-24 PROCEDURE — 93788 AMBL BP MNTR W/SW A/R: CPT

## 2025-08-18 ENCOUNTER — OFFICE VISIT (OUTPATIENT)
Dept: CARDIOLOGY | Facility: CLINIC | Age: 65
End: 2025-08-18
Attending: INTERNAL MEDICINE
Payer: COMMERCIAL

## 2025-08-18 VITALS
WEIGHT: 225.6 LBS | OXYGEN SATURATION: 100 % | DIASTOLIC BLOOD PRESSURE: 84 MMHG | BODY MASS INDEX: 32.3 KG/M2 | HEIGHT: 70 IN | SYSTOLIC BLOOD PRESSURE: 130 MMHG | HEART RATE: 82 BPM

## 2025-08-18 DIAGNOSIS — R03.0 BLOOD PRESSURE ELEVATED WITHOUT HISTORY OF HTN: ICD-10-CM

## 2025-08-18 DIAGNOSIS — I10 HYPERTENSION GOAL BP (BLOOD PRESSURE) < 140/90: ICD-10-CM

## 2025-08-18 DIAGNOSIS — I48.0 PAROXYSMAL ATRIAL FIBRILLATION (H): ICD-10-CM

## 2025-08-18 DIAGNOSIS — E78.5 HYPERLIPIDEMIA LDL GOAL <130: ICD-10-CM

## 2025-08-18 PROCEDURE — G2211 COMPLEX E/M VISIT ADD ON: HCPCS | Performed by: INTERNAL MEDICINE

## 2025-08-18 PROCEDURE — 99214 OFFICE O/P EST MOD 30 MIN: CPT | Performed by: INTERNAL MEDICINE

## 2025-08-18 RX ORDER — LISINOPRIL 10 MG/1
30 TABLET ORAL DAILY
Qty: 270 TABLET | Refills: 3 | Status: SHIPPED | OUTPATIENT
Start: 2025-08-18

## (undated) RX ORDER — FENTANYL CITRATE 50 UG/ML
INJECTION, SOLUTION INTRAMUSCULAR; INTRAVENOUS
Status: DISPENSED
Start: 2021-06-28

## (undated) RX ORDER — FENTANYL CITRATE 50 UG/ML
INJECTION, SOLUTION INTRAMUSCULAR; INTRAVENOUS
Status: DISPENSED
Start: 2021-01-11

## (undated) RX ORDER — NALOXONE HYDROCHLORIDE 0.4 MG/ML
INJECTION, SOLUTION INTRAMUSCULAR; INTRAVENOUS; SUBCUTANEOUS
Status: DISPENSED
Start: 2021-01-11

## (undated) RX ORDER — GLYCOPYRROLATE 0.2 MG/ML
INJECTION, SOLUTION INTRAMUSCULAR; INTRAVENOUS
Status: DISPENSED
Start: 2021-01-11

## (undated) RX ORDER — METOPROLOL TARTRATE 1 MG/ML
INJECTION, SOLUTION INTRAVENOUS
Status: DISPENSED
Start: 2021-01-11

## (undated) RX ORDER — FENTANYL CITRATE 50 UG/ML
INJECTION, SOLUTION INTRAMUSCULAR; INTRAVENOUS
Status: DISPENSED
Start: 2020-12-28

## (undated) RX ORDER — POTASSIUM CHLORIDE 1500 MG/1
TABLET, EXTENDED RELEASE ORAL
Status: DISPENSED
Start: 2021-01-11

## (undated) RX ORDER — REGADENOSON 0.08 MG/ML
INJECTION, SOLUTION INTRAVENOUS
Status: DISPENSED
Start: 2021-12-28

## (undated) RX ORDER — FLUMAZENIL 0.1 MG/ML
INJECTION, SOLUTION INTRAVENOUS
Status: DISPENSED
Start: 2021-01-11

## (undated) RX ORDER — LIDOCAINE HYDROCHLORIDE 40 MG/ML
SOLUTION TOPICAL
Status: DISPENSED
Start: 2021-01-11